# Patient Record
Sex: FEMALE | Race: WHITE | NOT HISPANIC OR LATINO | Employment: OTHER | URBAN - METROPOLITAN AREA
[De-identification: names, ages, dates, MRNs, and addresses within clinical notes are randomized per-mention and may not be internally consistent; named-entity substitution may affect disease eponyms.]

---

## 2021-11-12 ENCOUNTER — TELEPHONE (OUTPATIENT)
Dept: CARDIOLOGY | Facility: MEDICAL CENTER | Age: 66
End: 2021-11-12

## 2021-11-12 ENCOUNTER — APPOINTMENT (OUTPATIENT)
Dept: RADIOLOGY | Facility: MEDICAL CENTER | Age: 66
DRG: 280 | End: 2021-11-12
Attending: EMERGENCY MEDICINE
Payer: MEDICARE

## 2021-11-12 ENCOUNTER — HOSPITAL ENCOUNTER (INPATIENT)
Facility: MEDICAL CENTER | Age: 66
LOS: 5 days | DRG: 280 | End: 2021-11-17
Attending: HOSPITALIST | Admitting: INTERNAL MEDICINE
Payer: MEDICARE

## 2021-11-12 ENCOUNTER — APPOINTMENT (OUTPATIENT)
Dept: RADIOLOGY | Facility: MEDICAL CENTER | Age: 66
DRG: 280 | End: 2021-11-12
Attending: INTERNAL MEDICINE
Payer: MEDICARE

## 2021-11-12 ENCOUNTER — APPOINTMENT (OUTPATIENT)
Dept: CARDIOLOGY | Facility: MEDICAL CENTER | Age: 66
DRG: 280 | End: 2021-11-12
Attending: INTERNAL MEDICINE
Payer: MEDICARE

## 2021-11-12 DIAGNOSIS — J18.9 PNEUMONIA OF RIGHT LOWER LOBE DUE TO INFECTIOUS ORGANISM: ICD-10-CM

## 2021-11-12 DIAGNOSIS — I65.21 STENOSIS OF RIGHT CAROTID ARTERY: ICD-10-CM

## 2021-11-12 DIAGNOSIS — J96.01 ACUTE RESPIRATORY FAILURE WITH HYPOXIA (HCC): ICD-10-CM

## 2021-11-12 DIAGNOSIS — J96.02 ACUTE RESPIRATORY FAILURE WITH HYPERCAPNIA (HCC): ICD-10-CM

## 2021-11-12 DIAGNOSIS — J44.9 CHRONIC OBSTRUCTIVE PULMONARY DISEASE, UNSPECIFIED COPD TYPE (HCC): ICD-10-CM

## 2021-11-12 DIAGNOSIS — I95.9 HYPOTENSION, UNSPECIFIED HYPOTENSION TYPE: Primary | ICD-10-CM

## 2021-11-12 DIAGNOSIS — I21.3 ST ELEVATION MYOCARDIAL INFARCTION (STEMI), UNSPECIFIED ARTERY (HCC): ICD-10-CM

## 2021-11-12 DIAGNOSIS — R41.82 ALTERED MENTAL STATUS, UNSPECIFIED ALTERED MENTAL STATUS TYPE: ICD-10-CM

## 2021-11-12 DIAGNOSIS — R53.1 WEAKNESS: ICD-10-CM

## 2021-11-12 DIAGNOSIS — I21.02 ST ELEVATION MYOCARDIAL INFARCTION INVOLVING LEFT ANTERIOR DESCENDING (LAD) CORONARY ARTERY (HCC): ICD-10-CM

## 2021-11-12 PROBLEM — R41.0 DISORIENTATION: Status: ACTIVE | Noted: 2021-11-12

## 2021-11-12 LAB
ANION GAP SERPL CALC-SCNC: 14 MMOL/L (ref 7–16)
ANISOCYTOSIS BLD QL SMEAR: ABNORMAL
APTT PPP: 102.3 SEC (ref 24.7–36)
BASE EXCESS BLDV CALC-SCNC: -4 MMOL/L (ref -4–3)
BASOPHILS # BLD AUTO: 0 % (ref 0–1.8)
BASOPHILS # BLD: 0 K/UL (ref 0–0.12)
BODY TEMPERATURE: ABNORMAL DEGREES
BUN SERPL-MCNC: 42 MG/DL (ref 8–22)
CALCIUM SERPL-MCNC: 8.4 MG/DL (ref 8.5–10.5)
CHLORIDE SERPL-SCNC: 96 MMOL/L (ref 96–112)
CO2 BLDV-SCNC: 30 MMOL/L (ref 20–33)
CO2 SERPL-SCNC: 21 MMOL/L (ref 20–33)
CREAT SERPL-MCNC: 1 MG/DL (ref 0.5–1.4)
EKG IMPRESSION: NORMAL
EOSINOPHIL # BLD AUTO: 0 K/UL (ref 0–0.51)
EOSINOPHIL NFR BLD: 0 % (ref 0–6.9)
ERYTHROCYTE [DISTWIDTH] IN BLOOD BY AUTOMATED COUNT: 49.7 FL (ref 35.9–50)
GLUCOSE SERPL-MCNC: 176 MG/DL (ref 65–99)
HCO3 BLDV-SCNC: 27.2 MMOL/L (ref 24–28)
HCT VFR BLD AUTO: 38.5 % (ref 37–47)
HCT VFR BLD CALC: 44 % (ref 37–47)
HGB BLD-MCNC: 12.3 G/DL (ref 12–16)
HGB BLD-MCNC: 15 G/DL (ref 12–16)
INR PPP: 1.19 (ref 0.87–1.13)
LACTATE BLD-SCNC: 1.1 MMOL/L (ref 0.5–2)
LG PLATELETS BLD QL SMEAR: NORMAL
LV EJECT FRACT  99904: 75
LV EJECT FRACT  99904: 75
LV EJECT FRACT MOD 2C 99903: 42.51
LV EJECT FRACT MOD 4C 99902: 68.32
LV EJECT FRACT MOD BP 99901: 57.15
LYMPHOCYTES # BLD AUTO: 2.73 K/UL (ref 1–4.8)
LYMPHOCYTES NFR BLD: 4.1 % (ref 22–41)
MACROCYTES BLD QL SMEAR: ABNORMAL
MAGNESIUM SERPL-MCNC: 2.2 MG/DL (ref 1.5–2.5)
MANUAL DIFF BLD: NORMAL
MCH RBC QN AUTO: 30.3 PG (ref 27–33)
MCHC RBC AUTO-ENTMCNC: 31.9 G/DL (ref 33.6–35)
MCV RBC AUTO: 94.8 FL (ref 81.4–97.8)
METAMYELOCYTES NFR BLD MANUAL: 4.9 %
MONOCYTES # BLD AUTO: 2.19 K/UL (ref 0–0.85)
MONOCYTES NFR BLD AUTO: 3.3 % (ref 0–13.4)
MORPHOLOGY BLD-IMP: NORMAL
MYELOCYTES NFR BLD MANUAL: 6.6 %
NEUTROPHILS # BLD AUTO: 53.93 K/UL (ref 2–7.15)
NEUTROPHILS NFR BLD: 79.5 % (ref 44–72)
NEUTS BAND NFR BLD MANUAL: 1.6 % (ref 0–10)
NRBC # BLD AUTO: 0.05 K/UL
NRBC BLD-RTO: 0.1 /100 WBC
NT-PROBNP SERPL IA-MCNC: ABNORMAL PG/ML (ref 0–125)
PCO2 BLDV: 80.2 MMHG (ref 41–51)
PCO2 TEMP ADJ BLDV: 79.2 MMHG (ref 41–51)
PH BLDV: 7.14 [PH] (ref 7.31–7.45)
PH TEMP ADJ BLDV: 7.14 [PH] (ref 7.31–7.45)
PLATELET # BLD AUTO: 617 K/UL (ref 164–446)
PLATELET BLD QL SMEAR: NORMAL
PMV BLD AUTO: 9.7 FL (ref 9–12.9)
PO2 BLDV: 94 MMHG (ref 25–40)
PO2 TEMP ADJ BLDV: 92 MMHG (ref 25–40)
POTASSIUM SERPL-SCNC: 4.9 MMOL/L (ref 3.6–5.5)
PROCALCITONIN SERPL-MCNC: 4.39 NG/ML
PROTHROMBIN TIME: 14.7 SEC (ref 12–14.6)
RBC # BLD AUTO: 4.06 M/UL (ref 4.2–5.4)
RBC BLD AUTO: PRESENT
SAO2 % BLDV: 94 %
SODIUM SERPL-SCNC: 131 MMOL/L (ref 135–145)
SPECIMEN DRAWN FROM PATIENT: ABNORMAL
TROPONIN T SERPL-MCNC: 455 NG/L (ref 6–19)
UFH PPP CHRO-ACNC: 0.33 IU/ML
UFH PPP CHRO-ACNC: 0.61 IU/ML
WBC # BLD AUTO: 66.5 K/UL (ref 4.8–10.8)

## 2021-11-12 PROCEDURE — 96368 THER/DIAG CONCURRENT INF: CPT

## 2021-11-12 PROCEDURE — 92950 HEART/LUNG RESUSCITATION CPR: CPT

## 2021-11-12 PROCEDURE — 03HY32Z INSERTION OF MONITORING DEVICE INTO UPPER ARTERY, PERCUTANEOUS APPROACH: ICD-10-PCS | Performed by: INTERNAL MEDICINE

## 2021-11-12 PROCEDURE — 94002 VENT MGMT INPAT INIT DAY: CPT

## 2021-11-12 PROCEDURE — 700101 HCHG RX REV CODE 250: Performed by: INTERNAL MEDICINE

## 2021-11-12 PROCEDURE — 93308 TTE F-UP OR LMTD: CPT

## 2021-11-12 PROCEDURE — 304538 HCHG NG TUBE

## 2021-11-12 PROCEDURE — 700111 HCHG RX REV CODE 636 W/ 250 OVERRIDE (IP): Performed by: EMERGENCY MEDICINE

## 2021-11-12 PROCEDURE — 700101 HCHG RX REV CODE 250: Performed by: EMERGENCY MEDICINE

## 2021-11-12 PROCEDURE — 31500 INSERT EMERGENCY AIRWAY: CPT

## 2021-11-12 PROCEDURE — 80500 HCHG CLINICAL PATH CONSULT-LIMITED: CPT

## 2021-11-12 PROCEDURE — 700105 HCHG RX REV CODE 258: Performed by: INTERNAL MEDICINE

## 2021-11-12 PROCEDURE — 700111 HCHG RX REV CODE 636 W/ 250 OVERRIDE (IP): Performed by: INTERNAL MEDICINE

## 2021-11-12 PROCEDURE — 02HV33Z INSERTION OF INFUSION DEVICE INTO SUPERIOR VENA CAVA, PERCUTANEOUS APPROACH: ICD-10-PCS | Performed by: INTERNAL MEDICINE

## 2021-11-12 PROCEDURE — 770022 HCHG ROOM/CARE - ICU (200)

## 2021-11-12 PROCEDURE — 85007 BL SMEAR W/DIFF WBC COUNT: CPT

## 2021-11-12 PROCEDURE — 99291 CRITICAL CARE FIRST HOUR: CPT | Performed by: INTERNAL MEDICINE

## 2021-11-12 PROCEDURE — 36620 INSERTION CATHETER ARTERY: CPT | Performed by: NURSE PRACTITIONER

## 2021-11-12 PROCEDURE — 71045 X-RAY EXAM CHEST 1 VIEW: CPT

## 2021-11-12 PROCEDURE — 303105 HCHG CATHETER EXTRA

## 2021-11-12 PROCEDURE — 5A1945Z RESPIRATORY VENTILATION, 24-96 CONSECUTIVE HOURS: ICD-10-PCS | Performed by: INTERNAL MEDICINE

## 2021-11-12 PROCEDURE — 87040 BLOOD CULTURE FOR BACTERIA: CPT

## 2021-11-12 PROCEDURE — 302214 INTUBATION BOX: Performed by: HOSPITALIST

## 2021-11-12 PROCEDURE — 82330 ASSAY OF CALCIUM: CPT

## 2021-11-12 PROCEDURE — 84295 ASSAY OF SERUM SODIUM: CPT

## 2021-11-12 PROCEDURE — C1751 CATH, INF, PER/CENT/MIDLINE: HCPCS

## 2021-11-12 PROCEDURE — 70450 CT HEAD/BRAIN W/O DYE: CPT

## 2021-11-12 PROCEDURE — 36620 INSERTION CATHETER ARTERY: CPT

## 2021-11-12 PROCEDURE — 85520 HEPARIN ASSAY: CPT | Mod: 91

## 2021-11-12 PROCEDURE — 83880 ASSAY OF NATRIURETIC PEPTIDE: CPT

## 2021-11-12 PROCEDURE — 36556 INSERT NON-TUNNEL CV CATH: CPT

## 2021-11-12 PROCEDURE — 37799 UNLISTED PX VASCULAR SURGERY: CPT

## 2021-11-12 PROCEDURE — 96365 THER/PROPH/DIAG IV INF INIT: CPT

## 2021-11-12 PROCEDURE — 87070 CULTURE OTHR SPECIMN AEROBIC: CPT

## 2021-11-12 PROCEDURE — 96366 THER/PROPH/DIAG IV INF ADDON: CPT

## 2021-11-12 PROCEDURE — 85730 THROMBOPLASTIN TIME PARTIAL: CPT

## 2021-11-12 PROCEDURE — 82803 BLOOD GASES ANY COMBINATION: CPT

## 2021-11-12 PROCEDURE — 99291 CRITICAL CARE FIRST HOUR: CPT | Mod: 25 | Performed by: INTERNAL MEDICINE

## 2021-11-12 PROCEDURE — 700105 HCHG RX REV CODE 258

## 2021-11-12 PROCEDURE — 84484 ASSAY OF TROPONIN QUANT: CPT

## 2021-11-12 PROCEDURE — 99292 CRITICAL CARE ADDL 30 MIN: CPT | Mod: 25 | Performed by: INTERNAL MEDICINE

## 2021-11-12 PROCEDURE — 700111 HCHG RX REV CODE 636 W/ 250 OVERRIDE (IP)

## 2021-11-12 PROCEDURE — 87205 SMEAR GRAM STAIN: CPT

## 2021-11-12 PROCEDURE — 84132 ASSAY OF SERUM POTASSIUM: CPT

## 2021-11-12 PROCEDURE — 99223 1ST HOSP IP/OBS HIGH 75: CPT | Performed by: PSYCHIATRY & NEUROLOGY

## 2021-11-12 PROCEDURE — 83735 ASSAY OF MAGNESIUM: CPT

## 2021-11-12 PROCEDURE — 85610 PROTHROMBIN TIME: CPT

## 2021-11-12 PROCEDURE — 99292 CRITICAL CARE ADDL 30 MIN: CPT | Performed by: INTERNAL MEDICINE

## 2021-11-12 PROCEDURE — 0042T CT-CEREBRAL PERFUSION ANALYSIS: CPT

## 2021-11-12 PROCEDURE — 99291 CRITICAL CARE FIRST HOUR: CPT

## 2021-11-12 PROCEDURE — 93005 ELECTROCARDIOGRAM TRACING: CPT | Performed by: INTERNAL MEDICINE

## 2021-11-12 PROCEDURE — 85027 COMPLETE CBC AUTOMATED: CPT

## 2021-11-12 PROCEDURE — 0BH17EZ INSERTION OF ENDOTRACHEAL AIRWAY INTO TRACHEA, VIA NATURAL OR ARTIFICIAL OPENING: ICD-10-PCS | Performed by: EMERGENCY MEDICINE

## 2021-11-12 PROCEDURE — 51702 INSERT TEMP BLADDER CATH: CPT

## 2021-11-12 PROCEDURE — 96367 TX/PROPH/DG ADDL SEQ IV INF: CPT

## 2021-11-12 PROCEDURE — 83605 ASSAY OF LACTIC ACID: CPT

## 2021-11-12 PROCEDURE — 93308 TTE F-UP OR LMTD: CPT | Mod: 26,76 | Performed by: INTERNAL MEDICINE

## 2021-11-12 PROCEDURE — 93325 DOPPLER ECHO COLOR FLOW MAPG: CPT

## 2021-11-12 PROCEDURE — 84145 PROCALCITONIN (PCT): CPT

## 2021-11-12 PROCEDURE — 93308 TTE F-UP OR LMTD: CPT | Mod: 26 | Performed by: INTERNAL MEDICINE

## 2021-11-12 PROCEDURE — 85014 HEMATOCRIT: CPT

## 2021-11-12 PROCEDURE — 70496 CT ANGIOGRAPHY HEAD: CPT

## 2021-11-12 PROCEDURE — 700117 HCHG RX CONTRAST REV CODE 255: Performed by: INTERNAL MEDICINE

## 2021-11-12 PROCEDURE — 700101 HCHG RX REV CODE 250

## 2021-11-12 PROCEDURE — 70498 CT ANGIOGRAPHY NECK: CPT

## 2021-11-12 PROCEDURE — 36556 INSERT NON-TUNNEL CV CATH: CPT | Mod: RT | Performed by: INTERNAL MEDICINE

## 2021-11-12 PROCEDURE — 80048 BASIC METABOLIC PNL TOTAL CA: CPT

## 2021-11-12 PROCEDURE — 96375 TX/PRO/DX INJ NEW DRUG ADDON: CPT

## 2021-11-12 RX ORDER — BISACODYL 10 MG
10 SUPPOSITORY, RECTAL RECTAL
Status: DISCONTINUED | OUTPATIENT
Start: 2021-11-12 | End: 2021-11-15

## 2021-11-12 RX ORDER — LORAZEPAM 0.5 MG/1
0.25 TABLET ORAL
COMMUNITY
End: 2022-10-24

## 2021-11-12 RX ORDER — ESCITALOPRAM OXALATE 10 MG/1
10 TABLET ORAL EVERY EVENING
COMMUNITY

## 2021-11-12 RX ORDER — FAMOTIDINE 20 MG/1
20 TABLET, FILM COATED ORAL EVERY 12 HOURS
Status: DISCONTINUED | OUTPATIENT
Start: 2021-11-12 | End: 2021-11-13

## 2021-11-12 RX ORDER — AMOXICILLIN 250 MG
2 CAPSULE ORAL 2 TIMES DAILY
Status: DISCONTINUED | OUTPATIENT
Start: 2021-11-12 | End: 2021-11-15

## 2021-11-12 RX ORDER — HEPARIN SODIUM 200 [USP'U]/100ML
INJECTION, SOLUTION INTRAVENOUS
Status: COMPLETED
Start: 2021-11-12 | End: 2021-11-12

## 2021-11-12 RX ORDER — NOREPINEPHRINE BITARTRATE 0.03 MG/ML
INJECTION, SOLUTION INTRAVENOUS
Status: COMPLETED | OUTPATIENT
Start: 2021-11-12 | End: 2021-11-12

## 2021-11-12 RX ORDER — BUPROPION HYDROCHLORIDE 75 MG/1
75 TABLET ORAL EVERY MORNING
COMMUNITY

## 2021-11-12 RX ORDER — VERAPAMIL HYDROCHLORIDE 2.5 MG/ML
INJECTION, SOLUTION INTRAVENOUS
Status: COMPLETED
Start: 2021-11-12 | End: 2021-11-12

## 2021-11-12 RX ORDER — ETOMIDATE 2 MG/ML
INJECTION INTRAVENOUS
Status: COMPLETED | OUTPATIENT
Start: 2021-11-12 | End: 2021-11-12

## 2021-11-12 RX ORDER — TOPIRAMATE 25 MG/1
75 TABLET ORAL EVERY EVENING
COMMUNITY

## 2021-11-12 RX ORDER — NOREPINEPHRINE BITARTRATE 0.03 MG/ML
0-30 INJECTION, SOLUTION INTRAVENOUS CONTINUOUS
Status: DISCONTINUED | OUTPATIENT
Start: 2021-11-12 | End: 2021-11-15

## 2021-11-12 RX ORDER — EPINEPHRINE HCL IN 0.9 % NACL 4MG/250ML
0-10 PLASTIC BAG, INJECTION (ML) INTRAVENOUS CONTINUOUS
Status: DISCONTINUED | OUTPATIENT
Start: 2021-11-12 | End: 2021-11-14

## 2021-11-12 RX ORDER — PHENYLEPHRINE HCL IN 0.9% NACL 0.5 MG/5ML
100 SYRINGE (ML) INTRAVENOUS
Status: DISPENSED | OUTPATIENT
Start: 2021-11-12 | End: 2021-11-13

## 2021-11-12 RX ORDER — HEPARIN SODIUM 5000 [USP'U]/100ML
0-30 INJECTION, SOLUTION INTRAVENOUS CONTINUOUS
Status: DISCONTINUED | OUTPATIENT
Start: 2021-11-12 | End: 2021-11-15

## 2021-11-12 RX ORDER — LIDOCAINE HYDROCHLORIDE 20 MG/ML
INJECTION, SOLUTION INFILTRATION; PERINEURAL
Status: COMPLETED
Start: 2021-11-12 | End: 2021-11-12

## 2021-11-12 RX ORDER — IPRATROPIUM BROMIDE AND ALBUTEROL SULFATE 2.5; .5 MG/3ML; MG/3ML
3 SOLUTION RESPIRATORY (INHALATION)
Status: DISCONTINUED | OUTPATIENT
Start: 2021-11-12 | End: 2021-11-17 | Stop reason: HOSPADM

## 2021-11-12 RX ORDER — ROCURONIUM BROMIDE 10 MG/ML
INJECTION, SOLUTION INTRAVENOUS
Status: COMPLETED | OUTPATIENT
Start: 2021-11-12 | End: 2021-11-12

## 2021-11-12 RX ORDER — TRAZODONE HYDROCHLORIDE 100 MG/1
50 TABLET ORAL
COMMUNITY

## 2021-11-12 RX ORDER — HEPARIN SODIUM 1000 [USP'U]/ML
INJECTION, SOLUTION INTRAVENOUS; SUBCUTANEOUS
Status: COMPLETED
Start: 2021-11-12 | End: 2021-11-12

## 2021-11-12 RX ORDER — NOREPINEPHRINE BITARTRATE 0.03 MG/ML
INJECTION, SOLUTION INTRAVENOUS
Status: COMPLETED
Start: 2021-11-12 | End: 2021-11-12

## 2021-11-12 RX ORDER — DEXMEDETOMIDINE HYDROCHLORIDE 4 UG/ML
.1-1.5 INJECTION, SOLUTION INTRAVENOUS CONTINUOUS
Status: DISCONTINUED | OUTPATIENT
Start: 2021-11-12 | End: 2021-11-14

## 2021-11-12 RX ORDER — HEPARIN SODIUM 1000 [USP'U]/ML
30 INJECTION, SOLUTION INTRAVENOUS; SUBCUTANEOUS PRN
Status: DISCONTINUED | OUTPATIENT
Start: 2021-11-12 | End: 2021-11-15

## 2021-11-12 RX ORDER — ALBUTEROL SULFATE 90 UG/1
2 AEROSOL, METERED RESPIRATORY (INHALATION) EVERY 6 HOURS PRN
Status: ON HOLD | COMMUNITY
End: 2022-10-20

## 2021-11-12 RX ORDER — POLYETHYLENE GLYCOL 3350 17 G/17G
1 POWDER, FOR SOLUTION ORAL
Status: DISCONTINUED | OUTPATIENT
Start: 2021-11-12 | End: 2021-11-15

## 2021-11-12 RX ORDER — SODIUM CHLORIDE 9 MG/ML
500 INJECTION, SOLUTION INTRAVENOUS ONCE
Status: COMPLETED | OUTPATIENT
Start: 2021-11-12 | End: 2021-11-12

## 2021-11-12 RX ORDER — EPINEPHRINE HCL IN 0.9 % NACL 4MG/250ML
PLASTIC BAG, INJECTION (ML) INTRAVENOUS
Status: COMPLETED
Start: 2021-11-12 | End: 2021-11-12

## 2021-11-12 RX ORDER — PREDNISONE 20 MG/1
50 TABLET ORAL DAILY
Status: ON HOLD | COMMUNITY
End: 2022-10-20

## 2021-11-12 RX ORDER — TOPIRAMATE 25 MG/1
75 TABLET ORAL EVERY EVENING
Status: DISCONTINUED | OUTPATIENT
Start: 2021-11-12 | End: 2021-11-13

## 2021-11-12 RX ADMIN — HEPARIN SODIUM 12 UNITS/KG/HR: 5000 INJECTION, SOLUTION INTRAVENOUS at 17:10

## 2021-11-12 RX ADMIN — IPRATROPIUM BROMIDE AND ALBUTEROL SULFATE 3 ML: .5; 2.5 SOLUTION RESPIRATORY (INHALATION) at 20:45

## 2021-11-12 RX ADMIN — ETOMIDATE 20 MG: 2 INJECTION INTRAVENOUS at 18:29

## 2021-11-12 RX ADMIN — VASOPRESSIN 0.03 UNITS/MIN: 20 INJECTION INTRAVENOUS at 23:44

## 2021-11-12 RX ADMIN — DEXMEDETOMIDINE 0.7 MCG/KG/HR: 200 INJECTION, SOLUTION INTRAVENOUS at 19:17

## 2021-11-12 RX ADMIN — NOREPINEPHRINE BITARTRATE 30 MCG/MIN: 1 INJECTION INTRAVENOUS at 18:45

## 2021-11-12 RX ADMIN — ROCURONIUM BROMIDE 80 MG: 10 INJECTION, SOLUTION INTRAVENOUS at 18:30

## 2021-11-12 RX ADMIN — SODIUM CHLORIDE 500 ML: 9 INJECTION, SOLUTION INTRAVENOUS at 20:12

## 2021-11-12 RX ADMIN — IOHEXOL 40 ML: 350 INJECTION, SOLUTION INTRAVENOUS at 19:33

## 2021-11-12 RX ADMIN — EPINEPHRINE 2 MCG/MIN: 1 INJECTION INTRAMUSCULAR; INTRAVENOUS; SUBCUTANEOUS at 21:36

## 2021-11-12 RX ADMIN — NOREPINEPHRINE BITARTRATE 30 MCG/MIN: 1 INJECTION, SOLUTION, CONCENTRATE INTRAVENOUS at 20:12

## 2021-11-12 RX ADMIN — IOHEXOL 80 ML: 350 INJECTION, SOLUTION INTRAVENOUS at 19:31

## 2021-11-12 RX ADMIN — DOXYCYCLINE 100 MG: 100 INJECTION, POWDER, LYOPHILIZED, FOR SOLUTION INTRAVENOUS at 21:34

## 2021-11-12 RX ADMIN — IPRATROPIUM BROMIDE AND ALBUTEROL SULFATE 3 ML: .5; 2.5 SOLUTION RESPIRATORY (INHALATION) at 23:13

## 2021-11-12 RX ADMIN — FENTANYL CITRATE 100 MCG: 50 INJECTION, SOLUTION INTRAMUSCULAR; INTRAVENOUS at 19:16

## 2021-11-12 RX ADMIN — NOREPINEPHRINE BITARTRATE 5 MCG/MIN: 1 INJECTION INTRAVENOUS at 18:33

## 2021-11-12 NOTE — TELEPHONE ENCOUNTER
Call patient presents with chest pain for 8 days to an outside hospital and [facility], EKG shows remarkable ST elevations in the inferior and lateral leads including 1 concerning for significant MI blood pressure is adequate she has received lytics and will be transferred here immediately for evaluation and likely cardiac catheterization on arrival.    It is my pleasure to participate in the care of Ms. Prieto.  Please do not hesitate to contact me with questions or concerns.    Isaiah Beach MD PhD [facility]  Cardiologist [facility]    Please note that this dictation was created using voice recognition software. There may be errors I did not discover before finalizing the note.     11/12/2021  3:40 PM

## 2021-11-13 ENCOUNTER — HOSPITAL ENCOUNTER (OUTPATIENT)
Dept: RADIOLOGY | Facility: MEDICAL CENTER | Age: 66
End: 2021-11-13
Attending: INTERNAL MEDICINE

## 2021-11-13 PROBLEM — R94.31 PROLONGED Q-T INTERVAL ON ECG: Status: ACTIVE | Noted: 2021-11-13

## 2021-11-13 PROBLEM — E11.65 TYPE 2 DIABETES MELLITUS WITH HYPERGLYCEMIA, WITHOUT LONG-TERM CURRENT USE OF INSULIN (HCC): Status: ACTIVE | Noted: 2021-11-13

## 2021-11-13 PROBLEM — R40.4 ALTERED LEVEL OF CONSCIOUSNESS: Status: ACTIVE | Noted: 2021-11-13

## 2021-11-13 PROBLEM — R57.9 SHOCK (HCC): Status: ACTIVE | Noted: 2021-11-13

## 2021-11-13 PROBLEM — D72.829 LEUKOCYTOSIS: Status: ACTIVE | Noted: 2021-11-13

## 2021-11-13 PROBLEM — J96.01 ACUTE RESPIRATORY FAILURE WITH HYPOXIA AND HYPERCAPNIA (HCC): Status: ACTIVE | Noted: 2021-11-13

## 2021-11-13 PROBLEM — R53.1 ACUTE LEFT-SIDED WEAKNESS: Status: ACTIVE | Noted: 2021-11-13

## 2021-11-13 PROBLEM — N17.0 ACUTE RENAL FAILURE WITH TUBULAR NECROSIS (HCC): Status: ACTIVE | Noted: 2021-11-13

## 2021-11-13 PROBLEM — J96.02 ACUTE RESPIRATORY FAILURE WITH HYPOXIA AND HYPERCAPNIA (HCC): Status: ACTIVE | Noted: 2021-11-13

## 2021-11-13 PROBLEM — E87.1 HYPONATREMIA: Status: ACTIVE | Noted: 2021-11-13

## 2021-11-13 PROBLEM — J44.9 COPD (CHRONIC OBSTRUCTIVE PULMONARY DISEASE) (HCC): Status: ACTIVE | Noted: 2021-11-13

## 2021-11-13 PROBLEM — D75.839 THROMBOCYTOSIS: Status: ACTIVE | Noted: 2021-11-13

## 2021-11-13 PROBLEM — J18.9 PNEUMONIA DUE TO INFECTIOUS ORGANISM: Status: ACTIVE | Noted: 2021-11-13

## 2021-11-13 LAB
ALBUMIN SERPL BCP-MCNC: 2.4 G/DL (ref 3.2–4.9)
ALBUMIN/GLOB SERPL: 0.7 G/DL
ALP SERPL-CCNC: 185 U/L (ref 30–99)
ALT SERPL-CCNC: 21 U/L (ref 2–50)
ANION GAP SERPL CALC-SCNC: 14 MMOL/L (ref 7–16)
ANION GAP SERPL CALC-SCNC: 16 MMOL/L (ref 7–16)
ANISOCYTOSIS BLD QL SMEAR: ABNORMAL
AST SERPL-CCNC: 16 U/L (ref 12–45)
BASE EXCESS BLDA CALC-SCNC: -2 MMOL/L (ref -4–3)
BASE EXCESS BLDA CALC-SCNC: -7 MMOL/L (ref -4–3)
BASOPHILS # BLD AUTO: 0.8 % (ref 0–1.8)
BASOPHILS # BLD: 0.47 K/UL (ref 0–0.12)
BILIRUB SERPL-MCNC: 0.3 MG/DL (ref 0.1–1.5)
BODY TEMPERATURE: ABNORMAL DEGREES
BODY TEMPERATURE: ABNORMAL DEGREES
BREATHS SETTING VENT: 22
BREATHS SETTING VENT: 24
BUN SERPL-MCNC: 43 MG/DL (ref 8–22)
BUN SERPL-MCNC: 51 MG/DL (ref 8–22)
CA-I BLD ISE-SCNC: 1.1 MMOL/L (ref 1.1–1.3)
CA-I BLD ISE-SCNC: 1.2 MMOL/L (ref 1.1–1.3)
CALCIUM SERPL-MCNC: 7.9 MG/DL (ref 8.5–10.5)
CALCIUM SERPL-MCNC: 8.3 MG/DL (ref 8.5–10.5)
CHLORIDE SERPL-SCNC: 100 MMOL/L (ref 96–112)
CHLORIDE SERPL-SCNC: 94 MMOL/L (ref 96–112)
CO2 BLDA-SCNC: 22 MMOL/L (ref 20–33)
CO2 BLDA-SCNC: 25 MMOL/L (ref 20–33)
CO2 SERPL-SCNC: 21 MMOL/L (ref 20–33)
CO2 SERPL-SCNC: 22 MMOL/L (ref 20–33)
CREAT SERPL-MCNC: 1.19 MG/DL (ref 0.5–1.4)
CREAT SERPL-MCNC: 1.29 MG/DL (ref 0.5–1.4)
DELSYS IDSYS: ABNORMAL
DELSYS IDSYS: ABNORMAL
DOHLE BOD BLD QL SMEAR: NORMAL
EKG IMPRESSION: NORMAL
EKG IMPRESSION: NORMAL
END TIDAL CARBON DIOXIDE IECO2: 32 MMHG
END TIDAL CARBON DIOXIDE IECO2: 38 MMHG
EOSINOPHIL # BLD AUTO: 0 K/UL (ref 0–0.51)
EOSINOPHIL NFR BLD: 0 % (ref 0–6.9)
ERYTHROCYTE [DISTWIDTH] IN BLOOD BY AUTOMATED COUNT: 49.1 FL (ref 35.9–50)
GLOBULIN SER CALC-MCNC: 3.4 G/DL (ref 1.9–3.5)
GLUCOSE BLD-MCNC: 135 MG/DL (ref 65–99)
GLUCOSE BLD-MCNC: 140 MG/DL (ref 65–99)
GLUCOSE SERPL-MCNC: 148 MG/DL (ref 65–99)
GLUCOSE SERPL-MCNC: 207 MG/DL (ref 65–99)
GRAM STN SPEC: NORMAL
HCO3 BLDA-SCNC: 20.4 MMOL/L (ref 17–25)
HCO3 BLDA-SCNC: 23.3 MMOL/L (ref 17–25)
HCT VFR BLD AUTO: 30 % (ref 37–47)
HCT VFR BLD AUTO: 35.7 % (ref 37–47)
HCT VFR BLD CALC: 38 % (ref 37–47)
HGB BLD-MCNC: 10 G/DL (ref 12–16)
HGB BLD-MCNC: 11.9 G/DL (ref 12–16)
HGB BLD-MCNC: 12.9 G/DL (ref 12–16)
HOROWITZ INDEX BLDA+IHG-RTO: 150 MM[HG]
HOROWITZ INDEX BLDA+IHG-RTO: 187 MM[HG]
LYMPHOCYTES # BLD AUTO: 0.53 K/UL (ref 1–4.8)
LYMPHOCYTES NFR BLD: 0.9 % (ref 22–41)
MACROCYTES BLD QL SMEAR: ABNORMAL
MAGNESIUM SERPL-MCNC: 2.3 MG/DL (ref 1.5–2.5)
MANUAL DIFF BLD: NORMAL
MCH RBC QN AUTO: 31.2 PG (ref 27–33)
MCHC RBC AUTO-ENTMCNC: 33.3 G/DL (ref 33.6–35)
MCV RBC AUTO: 93.7 FL (ref 81.4–97.8)
METAMYELOCYTES NFR BLD MANUAL: 2.6 %
MICROCYTES BLD QL SMEAR: ABNORMAL
MODE IMODE: ABNORMAL
MODE IMODE: ABNORMAL
MONOCYTES # BLD AUTO: 0.53 K/UL (ref 0–0.85)
MONOCYTES NFR BLD AUTO: 0.9 % (ref 0–13.4)
MORPHOLOGY BLD-IMP: NORMAL
MYELOCYTES NFR BLD MANUAL: 12.1 %
NEUTROPHILS # BLD AUTO: 46.63 K/UL (ref 2–7.15)
NEUTROPHILS NFR BLD: 75.9 % (ref 44–72)
NEUTS BAND NFR BLD MANUAL: 3.4 % (ref 0–10)
NRBC # BLD AUTO: 0.08 K/UL
NRBC BLD-RTO: 0.1 /100 WBC
NT-PROBNP SERPL IA-MCNC: ABNORMAL PG/ML (ref 0–125)
O2/TOTAL GAS SETTING VFR VENT: 30 %
O2/TOTAL GAS SETTING VFR VENT: 50 %
PCO2 BLDA: 42.3 MMHG (ref 26–37)
PCO2 BLDA: 49.4 MMHG (ref 26–37)
PCO2 TEMP ADJ BLDA: 44.6 MMHG (ref 26–37)
PCO2 TEMP ADJ BLDA: 49.6 MMHG (ref 26–37)
PEEP END EXPIRATORY PRESSURE IPEEP: 8 CMH20
PEEP END EXPIRATORY PRESSURE IPEEP: 8 CMH20
PH BLDA: 7.22 [PH] (ref 7.4–7.5)
PH BLDA: 7.35 [PH] (ref 7.4–7.5)
PH TEMP ADJ BLDA: 7.22 [PH] (ref 7.4–7.5)
PH TEMP ADJ BLDA: 7.33 [PH] (ref 7.4–7.5)
PHOSPHATE SERPL-MCNC: 5.8 MG/DL (ref 2.5–4.5)
PLATELET # BLD AUTO: 640 K/UL (ref 164–446)
PLATELET BLD QL SMEAR: NORMAL
PMV BLD AUTO: 10.1 FL (ref 9–12.9)
PO2 BLDA: 56 MMHG (ref 64–87)
PO2 BLDA: 75 MMHG (ref 64–87)
PO2 TEMP ADJ BLDA: 61 MMHG (ref 64–87)
PO2 TEMP ADJ BLDA: 75 MMHG (ref 64–87)
POLYCHROMASIA BLD QL SMEAR: NORMAL
POTASSIUM BLD-SCNC: 4.3 MMOL/L (ref 3.6–5.5)
POTASSIUM BLD-SCNC: 4.3 MMOL/L (ref 3.6–5.5)
POTASSIUM SERPL-SCNC: 4.4 MMOL/L (ref 3.6–5.5)
POTASSIUM SERPL-SCNC: 4.6 MMOL/L (ref 3.6–5.5)
PROMYELOCYTES NFR BLD MANUAL: 3.4 %
PROT SERPL-MCNC: 5.8 G/DL (ref 6–8.2)
RBC # BLD AUTO: 3.81 M/UL (ref 4.2–5.4)
RBC BLD AUTO: PRESENT
SAO2 % BLDA: 87 % (ref 93–99)
SAO2 % BLDA: 92 % (ref 93–99)
SIGNIFICANT IND 70042: NORMAL
SITE SITE: NORMAL
SODIUM BLD-SCNC: 127 MMOL/L (ref 135–145)
SODIUM BLD-SCNC: 132 MMOL/L (ref 135–145)
SODIUM SERPL-SCNC: 132 MMOL/L (ref 135–145)
SODIUM SERPL-SCNC: 135 MMOL/L (ref 135–145)
SOURCE SOURCE: NORMAL
SPECIMEN DRAWN FROM PATIENT: ABNORMAL
SPECIMEN DRAWN FROM PATIENT: ABNORMAL
TIDAL VOLUME IVT: 340 ML
TIDAL VOLUME IVT: 380 ML
TOXIC GRANULES BLD QL SMEAR: SLIGHT
WBC # BLD AUTO: 58.8 K/UL (ref 4.8–10.8)

## 2021-11-13 PROCEDURE — 83880 ASSAY OF NATRIURETIC PEPTIDE: CPT

## 2021-11-13 PROCEDURE — 84100 ASSAY OF PHOSPHORUS: CPT

## 2021-11-13 PROCEDURE — 99233 SBSQ HOSP IP/OBS HIGH 50: CPT | Performed by: INTERNAL MEDICINE

## 2021-11-13 PROCEDURE — 700105 HCHG RX REV CODE 258: Performed by: INTERNAL MEDICINE

## 2021-11-13 PROCEDURE — 700111 HCHG RX REV CODE 636 W/ 250 OVERRIDE (IP): Performed by: INTERNAL MEDICINE

## 2021-11-13 PROCEDURE — 37799 UNLISTED PX VASCULAR SURGERY: CPT

## 2021-11-13 PROCEDURE — 80053 COMPREHEN METABOLIC PANEL: CPT

## 2021-11-13 PROCEDURE — 71045 X-RAY EXAM CHEST 1 VIEW: CPT

## 2021-11-13 PROCEDURE — 87040 BLOOD CULTURE FOR BACTERIA: CPT

## 2021-11-13 PROCEDURE — 770022 HCHG ROOM/CARE - ICU (200)

## 2021-11-13 PROCEDURE — 82803 BLOOD GASES ANY COMBINATION: CPT

## 2021-11-13 PROCEDURE — 700101 HCHG RX REV CODE 250: Performed by: INTERNAL MEDICINE

## 2021-11-13 PROCEDURE — A9270 NON-COVERED ITEM OR SERVICE: HCPCS | Performed by: INTERNAL MEDICINE

## 2021-11-13 PROCEDURE — 99291 CRITICAL CARE FIRST HOUR: CPT | Performed by: INTERNAL MEDICINE

## 2021-11-13 PROCEDURE — 94003 VENT MGMT INPAT SUBQ DAY: CPT

## 2021-11-13 PROCEDURE — 82962 GLUCOSE BLOOD TEST: CPT | Mod: 91

## 2021-11-13 PROCEDURE — 80048 BASIC METABOLIC PNL TOTAL CA: CPT

## 2021-11-13 PROCEDURE — 85027 COMPLETE CBC AUTOMATED: CPT

## 2021-11-13 PROCEDURE — 93005 ELECTROCARDIOGRAM TRACING: CPT | Performed by: INTERNAL MEDICINE

## 2021-11-13 PROCEDURE — 93010 ELECTROCARDIOGRAM REPORT: CPT | Performed by: INTERNAL MEDICINE

## 2021-11-13 PROCEDURE — 94799 UNLISTED PULMONARY SVC/PX: CPT

## 2021-11-13 PROCEDURE — 85018 HEMOGLOBIN: CPT

## 2021-11-13 PROCEDURE — 85007 BL SMEAR W/DIFF WBC COUNT: CPT

## 2021-11-13 PROCEDURE — 700102 HCHG RX REV CODE 250 W/ 637 OVERRIDE(OP): Performed by: INTERNAL MEDICINE

## 2021-11-13 PROCEDURE — 85520 HEPARIN ASSAY: CPT

## 2021-11-13 PROCEDURE — 99292 CRITICAL CARE ADDL 30 MIN: CPT | Performed by: INTERNAL MEDICINE

## 2021-11-13 PROCEDURE — 85014 HEMATOCRIT: CPT

## 2021-11-13 PROCEDURE — 83735 ASSAY OF MAGNESIUM: CPT

## 2021-11-13 RX ORDER — SODIUM CHLORIDE, SODIUM LACTATE, POTASSIUM CHLORIDE, AND CALCIUM CHLORIDE .6; .31; .03; .02 G/100ML; G/100ML; G/100ML; G/100ML
500 INJECTION, SOLUTION INTRAVENOUS ONCE
Status: COMPLETED | OUTPATIENT
Start: 2021-11-13 | End: 2021-11-13

## 2021-11-13 RX ORDER — TOPIRAMATE 25 MG/1
75 TABLET ORAL EVERY EVENING
Status: DISCONTINUED | OUTPATIENT
Start: 2021-11-13 | End: 2021-11-15

## 2021-11-13 RX ORDER — ESCITALOPRAM OXALATE 10 MG/1
10 TABLET ORAL EVERY EVENING
Status: DISCONTINUED | OUTPATIENT
Start: 2021-11-13 | End: 2021-11-15

## 2021-11-13 RX ORDER — ATORVASTATIN CALCIUM 40 MG/1
40 TABLET, FILM COATED ORAL EVERY EVENING
Status: DISCONTINUED | OUTPATIENT
Start: 2021-11-13 | End: 2021-11-15

## 2021-11-13 RX ORDER — DEXTROSE MONOHYDRATE 25 G/50ML
50 INJECTION, SOLUTION INTRAVENOUS
Status: DISCONTINUED | OUTPATIENT
Start: 2021-11-13 | End: 2021-11-17 | Stop reason: HOSPADM

## 2021-11-13 RX ORDER — ASPIRIN 81 MG/1
81 TABLET, CHEWABLE ORAL DAILY
Status: DISCONTINUED | OUTPATIENT
Start: 2021-11-13 | End: 2021-11-15

## 2021-11-13 RX ORDER — BUPROPION HYDROCHLORIDE 100 MG/1
150 TABLET ORAL EVERY MORNING
Status: DISCONTINUED | OUTPATIENT
Start: 2021-11-13 | End: 2021-11-15

## 2021-11-13 RX ORDER — FAMOTIDINE 20 MG/1
20 TABLET, FILM COATED ORAL DAILY
Status: DISCONTINUED | OUTPATIENT
Start: 2021-11-14 | End: 2021-11-14

## 2021-11-13 RX ORDER — ACETAMINOPHEN 325 MG/1
650 TABLET ORAL EVERY 4 HOURS PRN
Status: DISCONTINUED | OUTPATIENT
Start: 2021-11-13 | End: 2021-11-15

## 2021-11-13 RX ADMIN — SODIUM CHLORIDE, POTASSIUM CHLORIDE, SODIUM LACTATE AND CALCIUM CHLORIDE 500 ML: 600; 310; 30; 20 INJECTION, SOLUTION INTRAVENOUS at 16:23

## 2021-11-13 RX ADMIN — FAMOTIDINE 20 MG: 10 INJECTION INTRAVENOUS at 05:11

## 2021-11-13 RX ADMIN — NOREPINEPHRINE BITARTRATE 25 MCG/MIN: 1 INJECTION, SOLUTION, CONCENTRATE INTRAVENOUS at 00:20

## 2021-11-13 RX ADMIN — DEXMEDETOMIDINE 0.5 MCG/KG/HR: 200 INJECTION, SOLUTION INTRAVENOUS at 20:25

## 2021-11-13 RX ADMIN — ACETAMINOPHEN 650 MG: 325 TABLET, FILM COATED ORAL at 09:30

## 2021-11-13 RX ADMIN — VASOPRESSIN 0.03 UNITS/MIN: 20 INJECTION INTRAVENOUS at 20:24

## 2021-11-13 RX ADMIN — NOREPINEPHRINE BITARTRATE 16 MCG/MIN: 1 INJECTION, SOLUTION, CONCENTRATE INTRAVENOUS at 13:35

## 2021-11-13 RX ADMIN — TOPIRAMATE 75 MG: 25 TABLET, FILM COATED ORAL at 17:17

## 2021-11-13 RX ADMIN — DEXMEDETOMIDINE 0.7 MCG/KG/HR: 200 INJECTION, SOLUTION INTRAVENOUS at 03:11

## 2021-11-13 RX ADMIN — Medication 200 MCG/HR: at 09:35

## 2021-11-13 RX ADMIN — ESCITALOPRAM OXALATE 10 MG: 10 TABLET ORAL at 17:18

## 2021-11-13 RX ADMIN — FENTANYL CITRATE 100 MCG: 50 INJECTION, SOLUTION INTRAMUSCULAR; INTRAVENOUS at 17:59

## 2021-11-13 RX ADMIN — VASOPRESSIN 0.03 UNITS/MIN: 20 INJECTION INTRAVENOUS at 09:26

## 2021-11-13 RX ADMIN — CEFTRIAXONE SODIUM 1 G: 1 INJECTION, POWDER, FOR SOLUTION INTRAMUSCULAR; INTRAVENOUS at 05:13

## 2021-11-13 RX ADMIN — SODIUM BICARBONATE 100 MEQ: 84 INJECTION, SOLUTION INTRAVENOUS at 00:27

## 2021-11-13 RX ADMIN — NOREPINEPHRINE BITARTRATE 25 MCG/MIN: 1 INJECTION, SOLUTION, CONCENTRATE INTRAVENOUS at 05:13

## 2021-11-13 RX ADMIN — EPINEPHRINE 6 MCG/MIN: 1 INJECTION INTRAMUSCULAR; INTRAVENOUS; SUBCUTANEOUS at 00:30

## 2021-11-13 RX ADMIN — ATORVASTATIN CALCIUM 40 MG: 40 TABLET, FILM COATED ORAL at 17:18

## 2021-11-13 RX ADMIN — DOXYCYCLINE 100 MG: 100 INJECTION, POWDER, LYOPHILIZED, FOR SOLUTION INTRAVENOUS at 05:53

## 2021-11-13 RX ADMIN — BUPROPION HYDROCHLORIDE 150 MG: 100 TABLET, FILM COATED ORAL at 10:36

## 2021-11-13 RX ADMIN — NOREPINEPHRINE BITARTRATE 30 MCG/MIN: 1 INJECTION, SOLUTION, CONCENTRATE INTRAVENOUS at 00:26

## 2021-11-13 RX ADMIN — DOXYCYCLINE 100 MG: 100 INJECTION, POWDER, LYOPHILIZED, FOR SOLUTION INTRAVENOUS at 17:17

## 2021-11-13 RX ADMIN — ASPIRIN 81 MG: 81 TABLET, CHEWABLE ORAL at 10:36

## 2021-11-13 RX ADMIN — HYDROCORTISONE SODIUM SUCCINATE 50 MG: 100 INJECTION, POWDER, FOR SOLUTION INTRAMUSCULAR; INTRAVENOUS at 17:18

## 2021-11-13 RX ADMIN — SENNOSIDES AND DOCUSATE SODIUM 2 TABLET: 50; 8.6 TABLET ORAL at 17:18

## 2021-11-13 RX ADMIN — DEXMEDETOMIDINE 1 MCG/KG/HR: 200 INJECTION, SOLUTION INTRAVENOUS at 09:20

## 2021-11-13 RX ADMIN — HYDROCORTISONE SODIUM SUCCINATE 50 MG: 100 INJECTION, POWDER, FOR SOLUTION INTRAMUSCULAR; INTRAVENOUS at 10:38

## 2021-11-13 RX ADMIN — HEPARIN SODIUM 1900 UNITS: 1000 INJECTION INTRAVENOUS; SUBCUTANEOUS at 00:50

## 2021-11-13 RX ADMIN — SODIUM CHLORIDE, POTASSIUM CHLORIDE, SODIUM LACTATE AND CALCIUM CHLORIDE 500 ML: 600; 310; 30; 20 INJECTION, SOLUTION INTRAVENOUS at 14:55

## 2021-11-13 ASSESSMENT — PAIN DESCRIPTION - PAIN TYPE
TYPE: ACUTE PAIN

## 2021-11-13 NOTE — CONSULTS
Reason for Consult:  Asked by Dr sIaiah Squires and Jaxson Oviedo to see this patient with inferolateral STEMI      CC: Chest discomfort for days    HPI: This is a 66-year-old woman with a history of COPD, hypertension, dyslipidemia, likely tobacco and alcohol use or abuse who presents from the outside hospital as a STEMI transfer.  She had presented outside hospital this afternoon with days of chest pain perhaps up to 8 days perhaps worsened in the last couple days in the anterior left chest and shortness of breath.  She had remarkable ST elevation in the lateral and inferior leads and was giving thrombolytics aspirin nitro and transferred here in route she received some fentanyl for chest discomfort when she arrived here she is not complaining of chest pain but over the course of the first 30 minutes had a decline in her orientation status which is new and possible difficulty with peripheral sensation of pain although her motor function has been intact.  She also became more delirious not able to answer questions fully and appropriately but awake.  She is no longer oriented where she was reported to be clearly oriented post lytics    Medications / Drug list prior to admission:  No current facility-administered medications on file prior to encounter.     No current outpatient medications on file prior to encounter.       Current list of administered Medications:    Current Facility-Administered Medications:   •  heparin infusion 25,000 units in 500 mL 0.45% NACL, 0-30 Units/kg/hr (Adjusted), Intravenous, Continuous, Isaiah Beach M.D., Last Rate: 15.2 mL/hr at 11/12/21 1710, 12 Units/kg/hr at 11/12/21 1710  •  heparin injection 1,900 Units, 30 Units/kg (Adjusted), Intravenous, PRN, Isaiah Beach M.D.  No current outpatient medications on file.    Past Medical History:   Diagnosis Date   • COPD (chronic obstructive pulmonary disease) (HCC)    • Hypertension    • ST elevation myocardial infarction  "involving left anterior descending (LAD) coronary artery ([facility]) 2021   • ST elevation myocardial infarction involving left anterior descending (LAD) coronary artery (HCC) 2021       History reviewed. No pertinent surgical history.    Family History   Problem Relation Age of Onset   • Heart Disease Father 64         of MI     Patient family history was personally reviewed, no pertinent family history to current presentation    Social History     Tobacco Use   • Smoking status: Former Smoker   • Smokeless tobacco: Never Used   Substance Use Topics   • Alcohol use: Yes     Comment: reportedly   • Drug use: Not on file       ALLERGIES:  Not on File    Review of systems:  A complete review of symptoms was not able to be obtained given her clinical status and condition of medical status    Physical exam:  Patient Vitals for the past 24 hrs:   Height Weight   21 1700 1.6 m (5' 3\") 80 kg (176 lb 5.9 oz)   Heart rate normal blood pressure was 156/60 she is 99% on supplemental oxygen weight is reported as 80 kg  General: No acute distress.   EYES: no jaundice  HEENT: OP clear   Neck:  No JVD.   CVS: Regular no murmur  Resp: She has abnormal respiratory pattern concerning for CNS process  Abdomen: Soft, ND, obesity  Skin: Grossly nothing acute no obvious rashes  Neurological: Awake not orientable, Moves all extremities, no cranial nerve defects on limited exam she did have diminished withdrawal to pain in all 4 extremities  Extremities:   No edema. No cyanosis.       Data:  Laboratory studies personally reviewed by me:  No results found for this or any previous visit (from the past 24 hour(s)).    Imaging:  EC-ECHOCARDIOGRAM LTD W/O CONT         CT-HEAD W/O   Final Result    Limited examination.   1.  No acute intracranial abnormality is identified.   2.  Mild left frontal scalp swelling.      DX-CHEST-LIMITED (1 VIEW)   Final Result      1.  Small right pleural effusion with overlying " atelectasis/consolidation.   2.  Segmental left lower lobe atelectasis.   3.  Mild pulmonary vascular congestion.   4.  Mild cardiomegaly.      CL-LEFT HEART CATHETERIZATION WITH POSSIBLE INTERVENTION    (Results Pending)           EKG tracings personally reviewed by me sinus rhythm with remarkable ST elevations in lead I to III aVF and the lateral precordial leads without dramatic Q waves low-voltage though    Echocardiogram images personally reviewed by me show hyperdynamic basal mid segment with akinesis of the apical segments no thinning no significant valve disease limited evaluation due to available positioning and emergent nature of the pictures    All pertinent features of laboratory and imaging reviewed including primary images where applicable      Principal Problem:    ST elevation myocardial infarction involving left anterior descending (LAD) coronary artery (HCC) POA: Yes  Active Problems:    Disorientation POA: Unknown    Primary hypertension (Chronic) POA: Yes    Dyslipidemia (Chronic) POA: Unknown  Resolved Problems:    * No resolved hospital problems. *      Assessment / Plan:  ST elevation likely of the LAD artery given her echocardiogram and EKG unfortunately she has developed disorientation and possible other neurologic deficits which is quite concerning for an acute thrombotic or hemorrhagic effect given she received lytics today at 1320 p.m. at the outside hospital.  She is received appropriate care for her heart attack but given the concern for acute stroke with the possibility of intracranial hemorrhage we will defer taking her immediately to the catheterization labs until she has had full work-up of her stroke as anticoagulation required for evaluation of her heart attack may in fact cause intracranial hemorrhage and is a certain risk.  At the same time she has clear infarct based on EKG and echocardiogram and given her constellation of symptoms over days may be late presenting.  There is a  remote chance that this is TakoTsubo syndrome and that the current neurologic symptoms could be related to small left ventricular thrombus given the akinesis of the apex in that case she has received appropriate therapy with systemic thrombolysis and heparin.    She will be going through a code stroke activation      I personally discussed her case with Isaiah Squires and Jaxson Oviedo    It is my pleasure to participate in the care of Ms. Prieto.  Please do not hesitate to contact me with questions or concerns.    Isaiah Beach MD PhD [facility]  Cardiologist [facility]    11/12/2021    Please note that this dictation was created using voice recognition software. There may be errors I did not discover before finalizing the note.    Stella Prieto is critically ill and she is at high risk for clinical deterioration, worsening vital organ dysfunction, and death without the above critical care interventions.  Critical care time history is 90 minutes due to acute myocardial infarction STEMI. No time overlap. Procedures were not included in this time. This time was spent at the bedside evaluating the patient's chart, their labs,   imaging, physical exam and discussion with multiple team members including ICU pharmacy neurology as well as the bedside nurse, pharmacy and her daughter over the phone  and formulating an assessment and plan.

## 2021-11-13 NOTE — CARE PLAN
The patient is Unstable - High likelihood or risk of patient condition declining or worsening         Progress made toward(s) clinical / shift goals:    Problem: Knowledge Deficit - Standard  Goal: Patient and family/care givers will demonstrate understanding of plan of care, disease process/condition, diagnostic tests and medications  Outcome: Progressing  Note: Patient's daughter updated on POC>      Problem: Safety - Medical Restraint  Goal: Remains free of injury from restraints (Restraint for Interference with Medical Device)  Outcome: Progressing  Flowsheets (Taken 11/13/2021 0110)  Addressed this shift: Remains free of injury from restraints (restraint for interference with medical device): Every 2 hours: Monitor safety, psychosocial status, comfort, nutrition and hydration  Note: Patient in restraints in order to protect life saving device.

## 2021-11-13 NOTE — PROGRESS NOTES
Munir from Lab called with critical result of WBC 66.7 at 2242. Critical lab result read back to Munir.   Dr. Bowles notified of critical lab result at 2243.  Critical lab result read back by Dr. Bowles.

## 2021-11-13 NOTE — PROCEDURES
"Arterial Line Insertion    Date/Time: 11/12/2021 10:48 PM  Performed by: COLEMAN Dillard.  Authorized by: SOPHIE Dillard   Consent: The procedure was performed in an emergent situation.  Risks and benefits: risks, benefits and alternatives were discussed  Patient identity confirmed: arm band and anonymous protocol, patient vented/unresponsive  Time out: Immediately prior to procedure a \"time out\" was called to verify the correct patient, procedure, equipment, support staff and site/side marked as required.  Preparation: Patient was prepped and draped in the usual sterile fashion.  Indications: multiple ABGs, respiratory failure and hemodynamic monitoring  Location: right radial    Sedation:  Patient sedated: yes  Sedation type: (Precedex drip)  Analgesia: see MAR for details  Vitals: Vital signs were monitored during sedation.    Arnulfo's test normal: yes  Needle gauge: 20  Seldinger technique: Seldinger technique used  Number of attempts: 1  Post-procedure: line sutured and dressing applied  Post-procedure CMS: normal  Patient tolerance: patient tolerated the procedure well with no immediate complications              "

## 2021-11-13 NOTE — CONSULTS
Critical Care Consultation    Date of consult: 11/12/2021    Referring Physician  Jaxson Panda M.D.    Reason for Consultation  STEMI, altered LOC & respiratory failure    History of Presenting Illness  66 y.o. female history of COPD, diabetes and hypertension who presented 11/12/2021 with chest pain to [facility], ER.  EKG was consistent with STEMI and patient was given TNKase, heparin, nitro, aspirin and then sent to our facility for possible coronary artery angiogram.  Daughter gave a history of cough congestion and bronchitis-like symptoms for a week or so prior to admission.  She was given ceftriaxone prior to transfer for possible right lower lobe pneumonia.  On arrival here she is altered and had left-sided plegia and code stroke was called.  She had significant carotid stenosis but no intervention was suggested by exam or other images after neurology review.  However the stenosis was bad enough where neurology requested a blood pressure 120-140 range if possible since she neurologically was better at that higher pressure presumably due to better flow across the right carotid stenotic region.  I arrived to see her in the ER and she had some labored breathing and was altered for me and blood gas was being obtained that time and her PCO2 was in the 80s.  She was intubated and a central line was placed emergently.  She dropped her blood pressure after intubation.  500 cc fluid bolus was given and repeated x1 after reviewing the initial echocardiogram with cardiology who felt she was on the dry side by imaging.  Norepinephrine infusion was initiated and and initially blood pressure stabilized in the 120s on norepinephrine at 12.  See was subsequently moved to the [facility] and put tension persisted requiring additional pressors after norepinephrine was maxed at 30.  Follow-up EKG actually looked a little bit better and follow-up echocardiogram was unchanged.  Lactic acid was normal at 1.8.  Cardiology and I  reviewed the case at length and our presumption is this is a neurogenic hemodynamic process.  The addition of epinephrine raised her heart rate but really did not help her blood pressure.  Transiently when her blood pressure was 140 she actually was spontaneously moving her right side and trying to open her eyes.  Currently adding vasopressin and will try Jem-Synephrine as well in hopes to wean off epinephrine and get her heart rate back down in the face of a STEMI.  Reviewed the option of stenting of coronary or stenting the carotid with cardiology and neurology and both think the risk outweigh the benefits at this time.  Patient is currently post TNK on a heparin drip.      Code Status  Full Code    Review of Systems  Review of Systems   Unable to perform ROS: Acuity of condition       Past Medical History   has a past medical history of COPD (chronic obstructive pulmonary disease) ([facility]), Diabetes ([facility]), Hypertension, ST elevation myocardial infarction involving left anterior descending (LAD) coronary artery ([facility]) (11/12/2021), and ST elevation myocardial infarction involving left anterior descending (LAD) coronary artery ([facility]) (11/12/2021). She also has no past medical history of Stroke (Formerly [facility]).    Surgical History   has no past surgical history on file.    Family History  family history includes Heart Disease (age of onset: 64) in her father.    Social History   reports that she has quit smoking. She has never used smokeless tobacco. She reports current alcohol use.    Medications  Home Medications     Reviewed by Bruno De Luna (Pharmacy Tech) on 11/12/21 at 1924  Med List Status: Complete   Medication Last Dose Status   albuterol 108 (90 Base) MCG/ACT Aero Soln inhalation aerosol unk Active   buPROPion (WELLBUTRIN) 75 MG Tab unk Active   escitalopram (LEXAPRO) 10 MG Tab unk Active   ipratropium-albuterol (COMBIVENT RESPIMAT)  MCG/ACT Aero Soln unk Active   LORazepam (ATIVAN) 0.5 MG Tab unk Active    predniSONE (DELTASONE) 20 MG Tab unk Active   tiotropium (SPIRIVA RESPIMAT) 2.5 mcg/Act Aero Soln unk Active   topiramate (TOPAMAX) 25 MG Tab unk Active   traZODone (DESYREL) 100 MG Tab unk Active              Current Facility-Administered Medications   Medication Dose Route Frequency Provider Last Rate Last Admin   • heparin infusion 25,000 units in 500 mL 0.45% NACL  0-30 Units/kg/hr (Adjusted) Intravenous Continuous Isaiah Beach M.D. 15.2 mL/hr at 11/12/21 2300 12 Units/kg/hr at 11/12/21 2300   • heparin injection 1,900 Units  30 Units/kg (Adjusted) Intravenous PRN Isaiah Beach M.D.       • fentaNYL (SUBLIMAZE) injection 50 mcg  50 mcg Intravenous Q15 MIN PRN Amrit Bwoles M.D.        And   • fentaNYL (SUBLIMAZE) injection 100 mcg  100 mcg Intravenous Q15 MIN PRN Amrit Bowles M.D.   100 mcg at 11/12/21 1916    And   • fentaNYL (SUBLIMAZE) 50 mcg/mL in 50mL (Continuous Infusion)   Intravenous Continuous Amrit Bowles M.D. 1 mL/hr at 11/12/21 2315 50 mcg/hr at 11/12/21 2315    And   • dexmedetomidine (PRECEDEX) 400 mcg/100mL NS premix infusion  0-0.7 mcg/kg/hr Intravenous Continuous Amrit Bowles M.D. 14 mL/hr at 11/12/21 2317 0.7 mcg/kg/hr at 11/12/21 2317   • norepinephrine (Levophed) 8 mg in 250 mL NS infusion (premix)  0-30 mcg/min Intravenous Continuous Amrit Bowles M.D. 56.3 mL/hr at 11/13/21 0026 30 mcg/min at 11/13/21 0026   • topiramate (TOPAMAX) tablet 75 mg  75 mg Oral Q EVENING Amrit Bowles M.D.       • famotidine (PEPCID) tablet 20 mg  20 mg Enteral Tube Q12HRS Amrit Bowles M.D.        Or   • famotidine (PEPCID) injection 20 mg  20 mg Intravenous Q12HRS Amrit B Richeson, M.D.       • senna-docusate (PERICOLACE or SENOKOT S) 8.6-50 MG per tablet 2 Tablet  2 Tablet Enteral Tube BID Amrit Bowles M.D.        And   • polyethylene glycol/lytes (MIRALAX) PACKET 1 Packet  1 Packet Enteral Tube QDAY PRN Amrit Bowles M.D.        And   •  magnesium hydroxide (MILK OF MAGNESIA) suspension 30 mL  30 mL Enteral Tube QDAY PRN Amrit Bowles M.D.        And   • bisacodyl (DULCOLAX) suppository 10 mg  10 mg Rectal QDAY PRN Amrit Bowles M.D.       • MD Alert...ICU Electrolyte Replacement per Pharmacy   Other PHARMACY TO DOSE Amrit Bowles M.D.       • lidocaine (XYLOCAINE) 1 % injection 2 mL  2 mL Tracheal Tube Q30 MIN PRN Amrit Bowles M.D.       • Respiratory Therapy Consult   Nebulization Continuous RT Amrit Bowles M.D.       • ipratropium-albuterol (DUONEB) nebulizer solution  3 mL Nebulization Q2HRS PRN (RT) Amrit Bowles M.D.   3 mL at 11/12/21 2313   • cefTRIAXone (Rocephin) 1 g in  mL IVPB  1 g Intravenous Q24HRS Amrit Bowles M.D.       • doxycycline (VIBRAMYCIN) 100 mg in  mL IVPB  100 mg Intravenous Q12HRS Amrit Bowles M.D.   Stopped at 11/12/21 2234   • EPINEPHrine (Adrenalin) infusion 4 mg/250 mL (premix)  0-10 mcg/min Intravenous Continuous Amrit Bowles M.D. 18.8 mL/hr at 11/13/21 0056 5 mcg/min at 11/13/21 0056   • vasopressin (VASOSTRICT) 20 Units in  mL Infusion  0.03 Units/min Intravenous Continuous Amrit Bowles M.D. 9 mL/hr at 11/12/21 2344 0.03 Units/min at 11/12/21 2344   • phenylephrine (IVETTE-SYNEPHRINE) 100 mcg/mL inj (IV Push Syringe) 100 mcg  100 mcg Intravenous Q15 MIN PRN Amrit Bowles M.D.           Allergies  No Known Allergies    Vital Signs last 24 hours  Temp:  [35.9 °C (96.6 °F)-36.7 °C (98 °F)] 35.9 °C (96.6 °F)  Pulse:  [] 89  Resp:  [16-45] 24  BP: ()/(39-89) 119/72  SpO2:  [92 %-99 %] 97 %    Physical Exam  Physical Exam  Constitutional:       Appearance: She is obese. She is ill-appearing. She is not diaphoretic.      Interventions: She is sedated, intubated and restrained.   HENT:      Head: Normocephalic and atraumatic.      Mouth/Throat:      Mouth: Mucous membranes are dry.   Eyes:      General: No scleral icterus.     Pupils:  Pupils are equal, round, and reactive to light.   Neck:      Vascular: No JVD.   Cardiovascular:      Rate and Rhythm: Normal rate and regular rhythm.  No extrasystoles are present.     Heart sounds: No murmur heard.  No gallop.       Comments: SR/ST  Pulmonary:      Effort: She is intubated.      Breath sounds: Examination of the right-lower field reveals decreased breath sounds. Decreased breath sounds, rhonchi and rales present. No wheezing.   Abdominal:      General: Abdomen is protuberant. Bowel sounds are normal. There is no distension.      Palpations: Abdomen is soft. There is no hepatomegaly, splenomegaly or mass.      Tenderness: There is no abdominal tenderness. There is no guarding. Negative signs include Mayers's sign and McBurney's sign.      Hernia: No hernia is present.   Genitourinary:     Comments: Asha  Musculoskeletal:      Cervical back: Neck supple. No rigidity.      Right lower leg: No edema.      Left lower leg: No edema.   Lymphadenopathy:      Cervical: No cervical adenopathy.   Skin:     General: Skin is cool and dry.      Capillary Refill: Capillary refill takes 2 to 3 seconds.      Coloration: Skin is not cyanotic or mottled.      Nails: There is no clubbing.   Neurological:      Mental Status: She is lethargic.      GCS: GCS eye subscore is 1. GCS verbal subscore is 1. GCS motor subscore is 4.      Cranial Nerves: Cranial nerves are intact.   Psychiatric:      Comments: Unable to assess         Fluids    Intake/Output Summary (Last 24 hours) at 11/13/2021 0114  Last data filed at 11/13/2021 0000  Gross per 24 hour   Intake 495.9 ml   Output 425 ml   Net 70.9 ml       Laboratory  Recent Results (from the past 48 hour(s))   aPTT    Collection Time: 11/12/21  4:49 PM   Result Value Ref Range    APTT 102.3 (HH) 24.7 - 36.0 sec   Prothrombin Time    Collection Time: 11/12/21  4:49 PM   Result Value Ref Range    PT 14.7 (H) 12.0 - 14.6 sec    INR 1.19 (H) 0.87 - 1.13   Heparin Xa  (Unfractionated)    Collection Time: 21  4:49 PM   Result Value Ref Range    Heparin Xa (UFH) 0.61 IU/mL   EKG    Collection Time: 21  4:49 PM   Result Value Ref Range    Report       [facility] Emergency Dept.    Test Date:  2021  Pt Name:    QUINN RAMIREZ                  Department: 161  MRN:        [id number]                      Room:       [facility]  Gender:     Female                       Technician: HRR  :        [id number]                   Requested By:ANGIE GUERIN  Order #:    [id number]                    Reading MD:    Measurements  Intervals                                Axis  Rate:       108                          P:          69  MN:         167                          QRS:        56  QRSD:       94                           T:          64  QT:         350  QTc:        471    Interpretive Statements  Sinus tachycardia  Anterolateral infarct, acute  No previous ECG available for comparison     EC-ECHOCARDIOGRAM LTD W/O CONT    Collection Time: 21  5:30 PM   Result Value Ref Range    Eject.Frac. MOD BP 57.15     Eject.Frac. MOD 4C 68.32     Eject.Frac. MOD 2C 42.51     Left Ventrical Ejection Fraction 75    POCT venous blood gas device results    Collection Time: 21  6:31 PM   Result Value Ref Range    Ph 7.139 (L) 7.310 - 7.450    Pco2 80.2 (H) 41.0 - 51.0 mmHg    Po2 94 (H) 25 - 40 mmHg    Tco2 30 20 - 33 mmol/L    SO2 94 %    Hco3 27.2 24.0 - 28.0 mmol/L    BE -4 -4 - 3 mmol/L    Body Temp 36.7 C degrees    Ph Temp Correc 7.143 (L) 7.310 - 7.450    Pco2 Temp Samuel 79.2 (H) 41.0 - 51.0 mmHg    Po2 Temp Corre 92 (H) 25 - 40 mmHg    Specimen Venous    POCT hematocrit and hemoglobin device results    Collection Time: 21  6:31 PM   Result Value Ref Range    Istat Hematocrit 44 37 - 47 %    Istat Hemoglobin 15.0 12.0 - 16.0 g/dL   EC-ECHOCARDIOGRAM LTD W/O CONT    Collection Time: 21  9:30 PM   Result Value Ref Range    Left Ventrical Ejection  Fraction 75    Heparin Anti-Xa    Collection Time: 11/12/21  9:53 PM   Result Value Ref Range    Heparin Xa (UFH) 0.33 IU/mL   Basic Metabolic Panel    Collection Time: 11/12/21  9:53 PM   Result Value Ref Range    Sodium 131 (L) 135 - 145 mmol/L    Potassium 4.9 3.6 - 5.5 mmol/L    Chloride 96 96 - 112 mmol/L    Co2 21 20 - 33 mmol/L    Glucose 176 (H) 65 - 99 mg/dL    Bun 42 (H) 8 - 22 mg/dL    Creatinine 1.00 0.50 - 1.40 mg/dL    Calcium 8.4 (L) 8.5 - 10.5 mg/dL    Anion Gap 14.0 7.0 - 16.0   MAGNESIUM    Collection Time: 11/12/21  9:53 PM   Result Value Ref Range    Magnesium 2.2 1.5 - 2.5 mg/dL   CBC WITH DIFFERENTIAL    Collection Time: 11/12/21  9:53 PM   Result Value Ref Range    WBC 66.5 (HH) 4.8 - 10.8 K/uL    RBC 4.06 (L) 4.20 - 5.40 M/uL    Hemoglobin 12.3 12.0 - 16.0 g/dL    Hematocrit 38.5 37.0 - 47.0 %    MCV 94.8 81.4 - 97.8 fL    MCH 30.3 27.0 - 33.0 pg    MCHC 31.9 (L) 33.6 - 35.0 g/dL    RDW 49.7 35.9 - 50.0 fL    Platelet Count 617 (H) 164 - 446 K/uL    MPV 9.7 9.0 - 12.9 fL    Neutrophils-Polys 79.50 (H) 44.00 - 72.00 %    Lymphocytes 4.10 (L) 22.00 - 41.00 %    Monocytes 3.30 0.00 - 13.40 %    Eosinophils 0.00 0.00 - 6.90 %    Basophils 0.00 0.00 - 1.80 %    Nucleated RBC 0.10 /100 WBC    Neutrophils (Absolute) 53.93 (H) 2.00 - 7.15 K/uL    Lymphs (Absolute) 2.73 1.00 - 4.80 K/uL    Monos (Absolute) 2.19 (H) 0.00 - 0.85 K/uL    Eos (Absolute) 0.00 0.00 - 0.51 K/uL    Baso (Absolute) 0.00 0.00 - 0.12 K/uL    NRBC (Absolute) 0.05 K/uL    Anisocytosis 1+     Macrocytosis 1+    LACTIC ACID    Collection Time: 11/12/21  9:53 PM   Result Value Ref Range    Lactic Acid 1.1 0.5 - 2.0 mmol/L   PROCALCITONIN    Collection Time: 11/12/21  9:53 PM   Result Value Ref Range    Procalcitonin 4.39 (H) <0.25 ng/mL   proBrain Natriuretic Peptide, NT    Collection Time: 11/12/21  9:53 PM   Result Value Ref Range    NT-proBNP 73150 (H) 0 - 125 pg/mL   TROPONIN    Collection Time: 11/12/21  9:53 PM   Result Value  Ref Range    Troponin T 455 (H) 6 - 19 ng/L   ESTIMATED GFR    Collection Time: 21  9:53 PM   Result Value Ref Range    GFR If African American >60 >60 mL/min/1.73 m 2    GFR If Non  56 (A) >60 mL/min/1.73 m 2   PLATELET ESTIMATE    Collection Time: 21  9:53 PM   Result Value Ref Range    Plt Estimation Increased    MORPHOLOGY    Collection Time: 21  9:53 PM   Result Value Ref Range    RBC Morphology Present     Large Platelets 1+    PERIPHERAL SMEAR REVIEW    Collection Time: 21  9:53 PM   Result Value Ref Range    Peripheral Smear Review see below    DIFFERENTIAL MANUAL    Collection Time: 21  9:53 PM   Result Value Ref Range    Bands-Stabs 1.60 0.00 - 10.00 %    Metamyelocytes 4.90 %    Myelocytes 6.60 %    Manual Diff Status PERFORMED    EKG    Collection Time: 21 10:53 PM   Result Value Ref Range    Report       [facility]    Test Date:  2021  Pt Name:    QUINN RAMIREZ                  Department: [facility]  MRN:        [id number]                      Room:       [facility]  Gender:     Female                       Technician: EDITH  :        1955                   Requested By:ANGIE GUERIN  Order #:    [id number]                    Reading MD:    Measurements  Intervals                                Axis  Rate:       94                           P:          76  DC:         140                          QRS:        40  QRSD:       84                           T:          61  QT:         416  QTc:        521    Interpretive Statements  SINUS RHYTHM  ST ELEVATION, PROBABLE LATERAL INJURY  ST ELEVATION, CONSIDER ANTERIOR INJURY  PROLONGED QT INTERVAL  Compared to ECG 2021 16:49:20  ST (T wave) deviation now present  Prolonged QT interval now present  Sinus tachycardia no longer present  Myocardial infarct finding still present         Imaging  EC-ECHOCARDIOGRAM LTD W/O CONT   Final Result      DX-ABDOMEN FOR TUBE PLACEMENT   Final Result      NG tube  tip projects over the stomach.      DX-CHEST-PORTABLE (1 VIEW)   Final Result      1.  No pneumothorax following right IJ central catheter placement. Well-positioned ETT and central line.   2.  Bibasilar atelectasis versus consolidation and associated small to moderate right pleural effusion.      EC-ECHOCARDIOGRAM LTD W/O CONT   Final Result      CT-HEAD W/O   Final Result    Limited examination.   1.  No acute intracranial abnormality is identified.   2.  Mild left frontal scalp swelling.      DX-CHEST-LIMITED (1 VIEW)   Final Result      1.  Small right pleural effusion with overlying atelectasis/consolidation.   2.  Segmental left lower lobe atelectasis.   3.  Mild pulmonary vascular congestion.   4.  Mild cardiomegaly.      CL-LEFT HEART CATHETERIZATION WITH POSSIBLE INTERVENTION    (Results Pending)   CT-CTA HEAD WITH & W/O-POST PROCESS    (Results Pending)   CT-CEREBRAL PERFUSION ANALYSIS    (Results Pending)   CT-CTA NECK WITH & W/O-POST PROCESSING    (Results Pending)   DX-CHEST-PORTABLE (1 VIEW)    (Results Pending)       Assessment/Plan  * ST elevation myocardial infarction involving left anterior descending (LAD) coronary artery (HCC)- (present on admission)  Assessment & Plan  Status post ASA/TNK at [facility] 11/12  Initially was on nitroglycerin but this was stopped due to hypotension  Echo with preserved EF but LAD distribution wall motion consistent with MI  Repeat echo on arrival to [facility] unchanged  EKG follow-up evolving/improved, reviewed with cardiology  BNP over 20 K  Continuing aspirin/heparin infusion  Possible Cath Lab coronary artery intervention in a.m.    Altered level of consciousness  Assessment & Plan  Likely multifactorial  Significant R carotid stenosis per neurology, CTA head and neck report not back yet  Significant hypercarbia and secondary respiratory acidosis  Neuro exam seems proved with higher blood pressure making carotid obstructive process likely contributor  Neurology  recommended systolic blood pressure 120 if not 140 if possible  CT head negative  No neuro intervention per neurology  Patient status post TNK for STEMI  Consider follow-up CT scan or MRI in a.m. if stable enough to go to radiology  Intervention for carotid artery?    Acute respiratory failure with hypoxia and hypercapnia (HCC)  Assessment & Plan  Altered and very hypercarbic in ER and intubated 11/12  Not bronchospastic and may very well have a right lower lobe pneumonia associated with a small effusion  RT protocol/ventilator bundle  Treat pneumonia/COPD-add steroids if becomes bronchospastic and its not heart failure  Monitor for the need for forced diuresis with Lasix  SBT when clinically appropriate-neither are appropriate at this time  With hypotension will sedate with dexmedetomidine and fentanyl, SAT per protocol    Shock (HCC)  Assessment & Plan  Indeterminate  Initially with NSTEMI cardiogenic shock suspected but echo twice reveals good ejection fraction.  Lactic acid normal and although she may very well have right lower lobe pneumonia and were treating for it I do not believe this is septic shock.  Volume a little down initially by exam and imaging from myself with bedside ultrasound as well as cardiology.  CVP now low teens after 2 500 cc fluid boluses.  Reviewed the possibility of neurogenic shock/vasoplegia with cardiology and neurology.  Will attempt to maintain blood pressure 120 if not 140 if possible to support perfusion to her right hemisphere.  Actively titrating norepinephrine  Adding vasopressin/Jem-Synephrine to wean down or off epinephrine since did not seem to help blood pressure and she just got more tachycardic    Leukocytosis  Assessment & Plan  Marked elevated WBC, question leukemoid reaction  Determinate shock as outlined above  Cultures pending and empiric antibiotic regimen started for pneumonia  Nothing to suggest GI process at this time  Serial CBC    Pneumonia due to infectious  organism  Assessment & Plan  Right lower lobe pneumonia/atelectasis/effusion by chest x-ray  Started on ceftriaxone at [facility], continuing ceftriaxone and adding doxycycline  Sputum culture and blood culture x2 requested, check for culture results from [place] over the next few days  Procalcitonin to be trended    Prolonged Q-T interval on ECG  Assessment & Plan  QTc 521  Avoid QT prolonging agents  Optimize electrolytes  Cardiac monitoring    COPD (chronic obstructive pulmonary disease) (HCC)  Assessment & Plan  Not clearly exacerbating from a bronchospasm perspective  Symptom history from daughter most suggestive of pneumonia or bronchitis this week  RT protocols  DuoNeb every 2 hours as needed  Chest x-ray in a.m.    Dyslipidemia  Assessment & Plan  Statin    Primary hypertension- (present on admission)  Assessment & Plan  Currently hypotensive, holding home regimen      Discussed patient condition and risk of morbidity and/or mortality with RN, RT, Pharmacy, cardiology and ERP.    The patient remains critically ill.  Critical care time = 125 minutes in directly providing and coordinating critical care and extensive data review.  No time overlap and excludes procedures.

## 2021-11-13 NOTE — ASSESSMENT & PLAN NOTE
Felt to be secondary to carotid stenosis/ischemia, flow dependent -improving  Neurology consulted  Continue antiplatelet, statin   maintain perfusion  PT/OT/SLP eval  MRI brain pending again today

## 2021-11-13 NOTE — ED NOTES
Patient unable to participate in interview at this time.  Med Rec completed per patient's home pharmacy ([facility])  Allergies reviewed  No ORAL antibiotics in last 30 days

## 2021-11-13 NOTE — ASSESSMENT & PLAN NOTE
Improving, secondary to ATN, nonoliguric  Avoid nephrotoxins  Monitor creatinine, urine output, electrolytes closely

## 2021-11-13 NOTE — PROGRESS NOTES
[facility] Cardiology Follow-up Consult Note  Date of note:    11/13/2021          Patient ID:  Name:   Stella Prieto     YOB: 1955  Age:   66 y.o.  female   MRN:   [id number]    Chief Complaint   Patient presents with   • Chest Pain     Patient transfered from [place] for a STEMI. Patient was given TNK, Heparin, Nitro, ASA, PTA. Patient arrived on heparin drip. Patient also received Rocephin for PNA diagnosis       Interim Events:  [Patient continued to develop worsening shock requiring rapid institution of pressors and intubation for mental status decline and hypoxia blood gas confirms respiratory acidosis.  Repeat echocardiogram shows similar function with hyperdynamic heart with apical akinesis, EKG shows improvement of prior ST segments but not resolution.  Her CVP is 15      ROS  Unable to obtain now intubated    Past medical, surgical, social, and family history reviewed and unchanged from admission except as noted in assessment and plan.    Medications: Reviewed in MAR  Current Facility-Administered Medications   Medication Dose Frequency Provider Last Rate Last Admin   • heparin infusion 25,000 units in 500 mL 0.45% NACL  0-30 Units/kg/hr (Adjusted) Continuous Isaiah Beach M.D. 15.2 mL/hr at 11/12/21 1710 12 Units/kg/hr at 11/12/21 1710   • heparin injection 1,900 Units  30 Units/kg (Adjusted) PRN Isaiah Beach M.D.       • fentaNYL (SUBLIMAZE) injection 50 mcg  50 mcg Q15 MIN PRN Amrit Bowles M.D.        And   • fentaNYL (SUBLIMAZE) injection 100 mcg  100 mcg Q15 MIN PRN Amrit Bowles M.D.   100 mcg at 11/12/21 1916    And   • fentaNYL (SUBLIMAZE) 50 mcg/mL in 50mL (Continuous Infusion)   Continuous Amrit Bowles M.D. 1 mL/hr at 11/12/21 2315 50 mcg/hr at 11/12/21 2315    And   • dexmedetomidine (PRECEDEX) 400 mcg/100mL NS premix infusion  0-0.7 mcg/kg/hr Continuous Amrit Bowles M.D. 14 mL/hr at 11/12/21 2317 0.7 mcg/kg/hr at 11/12/21 2317   • norepinephrine  (Levophed) 8 mg in 250 mL NS infusion (premix)  0-30 mcg/min Continuous Amrit Bowles M.D. 56.3 mL/hr at 11/13/21 0026 30 mcg/min at 11/13/21 0026   • topiramate (TOPAMAX) tablet 75 mg  75 mg Q EVENING Amrit Bowles M.D.       • famotidine (PEPCID) tablet 20 mg  20 mg Q12HRS Amrit Bowles M.D.        Or   • famotidine (PEPCID) injection 20 mg  20 mg Q12HRS Amrit Bowles M.D.       • senna-docusate (PERICOLACE or SENOKOT S) 8.6-50 MG per tablet 2 Tablet  2 Tablet BID Amrit Bowles M.D.        And   • polyethylene glycol/lytes (MIRALAX) PACKET 1 Packet  1 Packet QDAY PRN Amrit Bowles M.D.        And   • magnesium hydroxide (MILK OF MAGNESIA) suspension 30 mL  30 mL QDAY PRN Amrit Bowles M.D.        And   • bisacodyl (DULCOLAX) suppository 10 mg  10 mg QDAY PRN Amrit Bowles M.D.       • MD Alert...ICU Electrolyte Replacement per Pharmacy   PHARMACY TO DOSE Amrit Bowles M.D.       • lidocaine (XYLOCAINE) 1 % injection 2 mL  2 mL Q30 MIN PRN Amrit Bowles M.D.       • Respiratory Therapy Consult   Continuous RT Amrit Bowles M.D.       • ipratropium-albuterol (DUONEB) nebulizer solution  3 mL Q2HRS PRN (RT) Amrit Bowles M.D.   3 mL at 11/12/21 2313   • cefTRIAXone (Rocephin) 1 g in  mL IVPB  1 g Q24HRS Amrit Bowles M.D.       • doxycycline (VIBRAMYCIN) 100 mg in  mL IVPB  100 mg Q12HRS Amrit Bowles M.D.   Stopped at 11/12/21 2234   • EPINEPHrine (Adrenalin) infusion 4 mg/250 mL (premix)  0-10 mcg/min Continuous Amrit Bowles M.D. 33.8 mL/hr at 11/13/21 0026 9 mcg/min at 11/13/21 0026   • vasopressin (VASOSTRICT) 20 Units in  mL Infusion  0.03 Units/min Continuous Amrit Bowles M.D. 9 mL/hr at 11/12/21 2344 0.03 Units/min at 11/12/21 2344   • phenylephrine (IVETTE-SYNEPHRINE) 100 mcg/mL inj (IV Push Syringe) 100 mcg  100 mcg Q15 MIN PRN Amrit Bowles M.D.       Last reviewed on 11/12/2021  7:24 PM by Reba KENNEDY  "Manus, PhT    No Known Allergies    Physical Exam  Body mass index is 32.26 kg/m². BP (!) 92/67   Pulse (!) 101   Temp 35.9 °C (96.6 °F)   Resp (!) 24   Ht 1.6 m (5' 3\")   Wt 82.6 kg (182 lb 1.6 oz)   SpO2 93%    Vitals:    21 2300 21 2315 21 2330 21 2345   BP: 106/65 (!) 89/58 (!) 92/67    Pulse: 91 96 96 (!) 101   Resp: (!) 22 (!) 21 (!) 24 (!) 24   Temp:       SpO2: 94% 96% 95% 93%   Weight:       Height:        Oxygen Therapy:  Pulse Oximetry: 93 %, O2 (LPM): 15, FiO2%: 100 %, O2 Delivery Device: Ventilator    General: On sedation with vent  Eyes: nl conjunctiva  ENT: ET tube  Neck: Central venous cath clear dry intact  Lungs: normal respiratory effort, CTAB  Heart: RRR, no murmurs, no rubs or gallops,   EXT no edema bilateral lower extremities. + pedal pulses. no cyanosis  Abdomen: soft, non tender, non distended,  Neurological: Difficult to determine on sedation with ventilator no posturing  Psychiatric: Fully sedated on vent  Skin: Warm extremities    Labs (personally reviewed and notable for):   Recent Results (from the past 24 hour(s))   aPTT    Collection Time: 21  4:49 PM   Result Value Ref Range    APTT 102.3 (HH) 24.7 - 36.0 sec   Prothrombin Time    Collection Time: 21  4:49 PM   Result Value Ref Range    PT 14.7 (H) 12.0 - 14.6 sec    INR 1.19 (H) 0.87 - 1.13   Heparin Xa (Unfractionated)    Collection Time: 21  4:49 PM   Result Value Ref Range    Heparin Xa (UFH) 0.61 IU/mL   EKG    Collection Time: 21  4:49 PM   Result Value Ref Range    Report       [facility] Emergency Dept.    Test Date:  2021  Pt Name:    QUINN RAMIREZ                  Department: 161  MRN:        [id number]                      Room:       27  Gender:     Female                       Technician: NYLA  :        [id number]                   Requested By:ANGIE GUERIN  Order #:    [id number]                    Reading MD:    Measurements  Intervals   "                              Axis  Rate:       108                          P:          69  SD:         167                          QRS:        56  QRSD:       94                           T:          64  QT:         350  QTc:        471    Interpretive Statements  Sinus tachycardia  Anterolateral infarct, acute  No previous ECG available for comparison     INTEGRATED BIOPHARMA-ECHOCARDIOGRAM Sharetivity W/O CONT    Collection Time: 11/12/21  5:30 PM   Result Value Ref Range    Eject.Frac. MOD BP 57.15     Eject.Frac. MOD 4C 68.32     Eject.Frac. MOD 2C 42.51     Left Ventrical Ejection Fraction 75    POCT venous blood gas device results    Collection Time: 11/12/21  6:31 PM   Result Value Ref Range    Ph 7.139 (L) 7.310 - 7.450    Pco2 80.2 (H) 41.0 - 51.0 mmHg    Po2 94 (H) 25 - 40 mmHg    Tco2 30 20 - 33 mmol/L    SO2 94 %    Hco3 27.2 24.0 - 28.0 mmol/L    BE -4 -4 - 3 mmol/L    Body Temp 36.7 C degrees    Ph Temp Correc 7.143 (L) 7.310 - 7.450    Pco2 Temp Samuel 79.2 (H) 41.0 - 51.0 mmHg    Po2 Temp Corre 92 (H) 25 - 40 mmHg    Specimen Venous    POCT hematocrit and hemoglobin device results    Collection Time: 11/12/21  6:31 PM   Result Value Ref Range    Istat Hematocrit 44 37 - 47 %    Istat Hemoglobin 15.0 12.0 - 16.0 g/dL   INTEGRATED BIOPHARMA-ECHOCARDIOGRAM LTD W/O CONT    Collection Time: 11/12/21  9:30 PM   Result Value Ref Range    Left Ventrical Ejection Fraction 75    Heparin Anti-Xa    Collection Time: 11/12/21  9:53 PM   Result Value Ref Range    Heparin Xa (UFH) 0.33 IU/mL   Basic Metabolic Panel    Collection Time: 11/12/21  9:53 PM   Result Value Ref Range    Sodium 131 (L) 135 - 145 mmol/L    Potassium 4.9 3.6 - 5.5 mmol/L    Chloride 96 96 - 112 mmol/L    Co2 21 20 - 33 mmol/L    Glucose 176 (H) 65 - 99 mg/dL    Bun 42 (H) 8 - 22 mg/dL    Creatinine 1.00 0.50 - 1.40 mg/dL    Calcium 8.4 (L) 8.5 - 10.5 mg/dL    Anion Gap 14.0 7.0 - 16.0   MAGNESIUM    Collection Time: 11/12/21  9:53 PM   Result Value Ref Range    Magnesium 2.2 1.5 - 2.5  mg/dL   CBC WITH DIFFERENTIAL    Collection Time: 11/12/21  9:53 PM   Result Value Ref Range    WBC 66.5 (HH) 4.8 - 10.8 K/uL    RBC 4.06 (L) 4.20 - 5.40 M/uL    Hemoglobin 12.3 12.0 - 16.0 g/dL    Hematocrit 38.5 37.0 - 47.0 %    MCV 94.8 81.4 - 97.8 fL    MCH 30.3 27.0 - 33.0 pg    MCHC 31.9 (L) 33.6 - 35.0 g/dL    RDW 49.7 35.9 - 50.0 fL    Platelet Count 617 (H) 164 - 446 K/uL    MPV 9.7 9.0 - 12.9 fL    Neutrophils-Polys 79.50 (H) 44.00 - 72.00 %    Lymphocytes 4.10 (L) 22.00 - 41.00 %    Monocytes 3.30 0.00 - 13.40 %    Eosinophils 0.00 0.00 - 6.90 %    Basophils 0.00 0.00 - 1.80 %    Nucleated RBC 0.10 /100 WBC    Neutrophils (Absolute) 53.93 (H) 2.00 - 7.15 K/uL    Lymphs (Absolute) 2.73 1.00 - 4.80 K/uL    Monos (Absolute) 2.19 (H) 0.00 - 0.85 K/uL    Eos (Absolute) 0.00 0.00 - 0.51 K/uL    Baso (Absolute) 0.00 0.00 - 0.12 K/uL    NRBC (Absolute) 0.05 K/uL    Anisocytosis 1+     Macrocytosis 1+    LACTIC ACID    Collection Time: 11/12/21  9:53 PM   Result Value Ref Range    Lactic Acid 1.1 0.5 - 2.0 mmol/L   PROCALCITONIN    Collection Time: 11/12/21  9:53 PM   Result Value Ref Range    Procalcitonin 4.39 (H) <0.25 ng/mL   proBrain Natriuretic Peptide, NT    Collection Time: 11/12/21  9:53 PM   Result Value Ref Range    NT-proBNP 01249 (H) 0 - 125 pg/mL   TROPONIN    Collection Time: 11/12/21  9:53 PM   Result Value Ref Range    Troponin T 455 (H) 6 - 19 ng/L   ESTIMATED GFR    Collection Time: 11/12/21  9:53 PM   Result Value Ref Range    GFR If African American >60 >60 mL/min/1.73 m 2    GFR If Non  56 (A) >60 mL/min/1.73 m 2   PLATELET ESTIMATE    Collection Time: 11/12/21  9:53 PM   Result Value Ref Range    Plt Estimation Increased    MORPHOLOGY    Collection Time: 11/12/21  9:53 PM   Result Value Ref Range    RBC Morphology Present     Large Platelets 1+    PERIPHERAL SMEAR REVIEW    Collection Time: 11/12/21  9:53 PM   Result Value Ref Range    Peripheral Smear Review see below     DIFFERENTIAL MANUAL    Collection Time: 21  9:53 PM   Result Value Ref Range    Bands-Stabs 1.60 0.00 - 10.00 %    Metamyelocytes 4.90 %    Myelocytes 6.60 %    Manual Diff Status PERFORMED    EKG    Collection Time: 21 10:53 PM   Result Value Ref Range    Report       [facility]    Test Date:  2021  Pt Name:    QUINN RAMIREZ                  Department: 161  MRN:        [id number]                      Room:       T627  Gender:     Female                       Technician: EDITH  :        [id number]                   Requested By:ANGIE GUERIN  Order #:    [id number]                    Reading MD:    Measurements  Intervals                                Axis  Rate:       94                           P:          76  NM:         140                          QRS:        40  QRSD:       84                           T:          61  QT:         416  QTc:        521    Interpretive Statements  SINUS RHYTHM  ST ELEVATION, PROBABLE LATERAL INJURY  ST ELEVATION, CONSIDER ANTERIOR INJURY  PROLONGED QT INTERVAL  Compared to ECG 2021 16:49:20  ST (T wave) deviation now present  Prolonged QT interval now present  Sinus tachycardia no longer present  Myocardial infarct finding still present         Cardiac Imaging and Procedures Review:    EKG and telemetry tracings personally reviewed    Impression and Medical Decision Making:  Principal Problem:    ST elevation myocardial infarction involving left anterior descending (LAD) coronary artery (HCC) POA: Yes  Active Problems:    Disorientation POA: Unknown    Primary hypertension (Chronic) POA: Yes    Dyslipidemia (Chronic) POA: Unknown  Resolved Problems:    * No resolved hospital problems. *    ST elevation MI now with shock    The degree of her shock is surprising and not expected for her echocardiographic findings, with her neurologic changes and concern for a spinal shock possibility she is appropriate volume resuscitated and is on appropriate  pressors, will work to wean epinephrine in favor of vasopressors given the possibility of acute MI.  Her lactate and procalcitonin are high so she is being treated for pneumonia.  Kidney function is adequate but needs close monitoring    I personally discussed her case with  Dr Amrit Curry, Conor Dennison    It is my pleasure to participate in the care of Ms. Prieto.  Please do not hesitate to contact me with questions or concerns.    Isaiah Beach MD PhD [facility]  Cardiologist [facility]    11/12/2021    Please note that this dictation was created using voice recognition software. There may be errors of grammar and possibly content I did not discover before finalizing the note.     Stella Prieto is critically ill and she is at high risk for clinical deterioration, worsening vital organ dysfunction, and death without the above critical care interventions.  Critical care time 45 minutes due to shock, STEMI. No time overlap. Procedures were not included in this time. This time was spent at the bedside evaluating the patient's chart, their labs,   imaging, physical exam and discussion with the ICU team as well as the bedside nurse, the patient's daughter at the bedside and formulating an assessment and plan.

## 2021-11-13 NOTE — HOSPITAL COURSE
"\"66 y.o. female history of COPD, diabetes and hypertension who presented 11/12/2021 with chest pain to [facility], ER.  EKG was consistent with STEMI and patient was given TNKase, heparin, nitro, aspirin and then sent to our facility for possible coronary artery angiogram.  Daughter gave a history of cough congestion and bronchitis-like symptoms for a week or so prior to admission.  She was given ceftriaxone prior to transfer for possible right lower lobe pneumonia.  On arrival here she is altered and had left-sided plegia and code stroke was called.  She had significant carotid stenosis but no intervention was suggested by exam or other images after neurology review.  However the stenosis was bad enough where neurology requested a blood pressure 120-140 range if possible since she neurologically was better at that higher pressure presumably due to better flow across the right carotid stenotic region.  I arrived to see her in the ER and she had some labored breathing and was altered for me and blood gas was being obtained that time and her PCO2 was in the 80s.  She was intubated and a central line was placed emergently.  She dropped her blood pressure after intubation.  500 cc fluid bolus was given and repeated x1 after reviewing the initial echocardiogram with cardiology who felt she was on the dry side by imaging.  Norepinephrine infusion was initiated and and initially blood pressure stabilized in the 120s on norepinephrine at 12.  See was subsequently moved to the [facility] and put tension persisted requiring additional pressors after norepinephrine was maxed at 30.  Follow-up EKG actually looked a little bit better and follow-up echocardiogram was unchanged.  Lactic acid was normal at 1.8.  Cardiology and I reviewed the case at length and our presumption is this is a neurogenic hemodynamic process.  The addition of epinephrine raised her heart rate but really did not help her blood pressure.  Transiently when her " "blood pressure was 140 she actually was spontaneously moving her right side and trying to open her eyes.  Currently adding vasopressin and will try Jem-Synephrine as well in hopes to wean off epinephrine and get her heart rate back down in the face of a STEMI.  Reviewed the option of stenting of coronary or stenting the carotid with cardiology and neurology and both think the risk outweigh the benefits at this time.  Patient is currently post TNK on a heparin drip.\" - Dr. Bowles 11/12 11/14 extubated, improving L sided weakness, R thoracentesis (800mL)  "

## 2021-11-13 NOTE — DISCHARGE PLANNING
Medical Social Work     SW met with the pt daughter Esha and escorted her to the [facility] waiting room. SW provided Esha with emotional support and advised the RN that the pt daughter is waiting in the [facility] waiting room.     Plan: SW will remain available for pt and family support.

## 2021-11-13 NOTE — ASSESSMENT & PLAN NOTE
Without acute exacerbation -monitor for wheezing/bronchospasm  RT/O2 protocol  Continue as needed bronchodilators

## 2021-11-13 NOTE — PROGRESS NOTES
[facility] Cardiology Follow-up Consult Note  Date of note:    11/13/2021          Patient ID:  Name:   Stella Prieto     YOB: 1955  Age:   66 y.o.  female   MRN:   [id number]    Chief Complaint   Patient presents with   • Chest Pain     Patient transfered from [place] for a STEMI. Patient was given TNK, Heparin, Nitro, ASA, PTA. Patient arrived on heparin drip. Patient also received Rocephin for PNA diagnosis       Interim Events:  Patient is remained sedated still on pressors EKG this a.m. shows near resolution of prior ST elevations when weaned from sedation.  She may still have left-sided weakness          ROS  No NV, No Bleeding, No dizziness   All other review of systems reviewed and negative.    Past medical, surgical, social, and family history reviewed and unchanged from admission except as noted in assessment and plan.    Medications: Reviewed in MAR  Current Facility-Administered Medications   Medication Dose Frequency Provider Last Rate Last Admin   • acetaminophen (Tylenol) tablet 650 mg  650 mg Q4HRS PRN Jeremy M Gonda, M.D.   650 mg at 11/13/21 0930   • hydrocortisone sodium succinate PF (Solu-CORTEF) 100 MG injection 50 mg  50 mg Q6HRS Jeremy M Gonda, M.D.   50 mg at 11/13/21 1038   • insulin regular (HumuLIN R,NovoLIN R) injection  2-9 Units Q6HRS Jeremy M Gonda, M.D.        And   • dextrose 50% (D50W) injection 50 mL  50 mL Q15 MIN PRN Jeremy M Gonda, M.D.       • aspirin (ASA) chewable tab 81 mg  81 mg DAILY Jeremy M Gonda, M.D.   81 mg at 11/13/21 1036   • atorvastatin (LIPITOR) tablet 40 mg  40 mg Q EVENING Jeremy M Gonda, M.D.       • [START ON 11/14/2021] famotidine (PEPCID) tablet 20 mg  20 mg DAILY Jeremy M Gonda, M.D.        Or   • [START ON 11/14/2021] famotidine (PEPCID) injection 20 mg  20 mg DAILY Jeremy M Gonda, M.D.       • buPROPion (WELLBUTRIN) tablet 150 mg  150 mg QAM Jeremy M Gonda, M.D.   150 mg at 11/13/21 1036   • escitalopram (Lexapro) tablet 10 mg  10 mg Q EVENING  Jeremy M Gonda, M.D.       • topiramate (TOPAMAX) tablet 75 mg  75 mg Q EVENING Jeremy M Gonda, M.D.       • heparin infusion 25,000 units in 500 mL 0.45% NACL  0-30 Units/kg/hr (Adjusted) Continuous Isaiah Beach M.D. 15.2 mL/hr at 11/13/21 0803 12 Units/kg/hr at 11/13/21 0803   • heparin injection 1,900 Units  30 Units/kg (Adjusted) PRN Isaiah Beach M.D.       • fentaNYL (SUBLIMAZE) injection 50 mcg  50 mcg Q15 MIN PRN Jeremy M Gonda, M.D.        And   • fentaNYL (SUBLIMAZE) injection 100 mcg  100 mcg Q15 MIN PRN Jeremy M Gonda, M.D.   100 mcg at 11/12/21 1916    And   • fentaNYL (SUBLIMAZE) 50 mcg/mL in 50mL (Continuous Infusion)   Continuous Jeremy M Gonda, M.D.   Stopped at 11/13/21 1152    And   • dexmedetomidine (PRECEDEX) 400 mcg/100mL NS premix infusion  0.1-1.5 mcg/kg/hr Continuous Jeremy M Gonda, M.D.   Stopped at 11/13/21 1153   • norepinephrine (Levophed) 8 mg in 250 mL NS infusion (premix)  0-30 mcg/min Continuous Amrit Bowles M.D. 26.3 mL/hr at 11/13/21 1337 14 mcg/min at 11/13/21 1337   • senna-docusate (PERICOLACE or SENOKOT S) 8.6-50 MG per tablet 2 Tablet  2 Tablet BID Amrit Bowles M.D.        And   • polyethylene glycol/lytes (MIRALAX) PACKET 1 Packet  1 Packet QDAY PRN Amrit Bowles M.D.        And   • magnesium hydroxide (MILK OF MAGNESIA) suspension 30 mL  30 mL QDAY PRN Amrit Bowles M.D.        And   • bisacodyl (DULCOLAX) suppository 10 mg  10 mg QDAY PRN Amrit Bowles M.D.       • MD Alert...ICU Electrolyte Replacement per Pharmacy   PHARMACY TO DOSE Amrit Bowles M.D.       • lidocaine (XYLOCAINE) 1 % injection 2 mL  2 mL Q30 MIN PRN Amrit Bowles M.D.       • Respiratory Therapy Consult   Continuous RT Amrit Bowles M.D.       • ipratropium-albuterol (DUONEB) nebulizer solution  3 mL Q2HRS PRN (RT) Amrit Bowles M.D.   3 mL at 11/12/21 8896   • cefTRIAXone (Rocephin) 1 g in  mL IVPB  1 g Q24HRS Amrit Bowles M.D.   " Stopped at 11/13/21 0543   • doxycycline (VIBRAMYCIN) 100 mg in  mL IVPB  100 mg Q12HRS Amrit Bowles M.D.   Stopped at 11/13/21 0653   • EPINEPHrine (Adrenalin) infusion 4 mg/250 mL (premix)  0-10 mcg/min Continuous Amrit Bowles M.D.   Stopped at 11/13/21 0613   • vasopressin (VASOSTRICT) 20 Units in  mL Infusion  0.03 Units/min Continuous Amrit Bowles M.D. 9 mL/hr at 11/13/21 0926 0.03 Units/min at 11/13/21 0926   Last reviewed on 11/12/2021  7:24 PM by Bruno De Luna    No Known Allergies    Physical Exam  Body mass index is 32.26 kg/m². /71   Pulse 66   Temp 37.5 °C (99.5 °F) (Bladder)   Resp (!) 26   Ht 1.6 m (5' 3\")   Wt 82.6 kg (182 lb 1.6 oz)   SpO2 95%    Vitals:    11/13/21 1145 11/13/21 1200 11/13/21 1215 11/13/21 1300   BP:   109/65 125/71   Pulse: 67 73 68 66   Resp: (!) 26 (!) 26 (!) 26 (!) 26   Temp:       TempSrc:  Bladder     SpO2: 95% 95% 94% 95%   Weight:       Height:        Oxygen Therapy:  Pulse Oximetry: 95 %, O2 (LPM): 15, FiO2%: 30 %, O2 Delivery Device: Ventilator    General: Sedated on the vent  Eyes: nl conjunctiva  ENT: ET tube no bleeding no mouth bleeding after lytics  Neck: Central venous catheter clean dry intact  Lungs: Vent sounds  Heart: Regular rate  EXT no edema bilateral lower extremities. + pedal pulses. no cyanosis  Abdomen: soft, non tender, non distended,  Neurological: No focal deficits  Psychiatric: Appropriate affect,   Skin: Warm extremities    Labs (personally reviewed and notable for):   Recent Results (from the past 24 hour(s))   aPTT    Collection Time: 11/12/21  4:49 PM   Result Value Ref Range    APTT 102.3 (HH) 24.7 - 36.0 sec   Prothrombin Time    Collection Time: 11/12/21  4:49 PM   Result Value Ref Range    PT 14.7 (H) 12.0 - 14.6 sec    INR 1.19 (H) 0.87 - 1.13   Heparin Xa (Unfractionated)    Collection Time: 11/12/21  4:49 PM   Result Value Ref Range    Heparin Xa (UFH) 0.61 IU/mL   EKG    Collection Time: " 21  4:49 PM   Result Value Ref Range    Report       [facility]    Test Date:  2021  Pt Name:    QUINN RAMIREZ                  Department: 161  MRN:        [id number]                      Room:       T627  Gender:     Female                       Technician: NYLA  :        [id number]                   Requested By:ANGIE GUERIN  Order #:    [id number]                    Reading MD:    Measurements  Intervals                                Axis  Rate:       108                          P:          69  MN:         167                          QRS:        56  QRSD:       94                           T:          64  QT:         350  QTc:        471    Interpretive Statements  Sinus tachycardia  Anterolateral infarct, acute  No previous ECG available for comparison     EC-ECHOCARDIOGRAM LTD W/O CONT    Collection Time: 21  5:30 PM   Result Value Ref Range    Eject.Frac. MOD BP 57.15     Eject.Frac. MOD 4C 68.32     Eject.Frac. MOD 2C 42.51     Left Ventrical Ejection Fraction 75    POCT venous blood gas device results    Collection Time: 21  6:31 PM   Result Value Ref Range    Ph 7.139 (L) 7.310 - 7.450    Pco2 80.2 (H) 41.0 - 51.0 mmHg    Po2 94 (H) 25 - 40 mmHg    Tco2 30 20 - 33 mmol/L    SO2 94 %    Hco3 27.2 24.0 - 28.0 mmol/L    BE -4 -4 - 3 mmol/L    Body Temp 36.7 C degrees    Ph Temp Correc 7.143 (L) 7.310 - 7.450    Pco2 Temp Samuel 79.2 (H) 41.0 - 51.0 mmHg    Po2 Temp Corre 92 (H) 25 - 40 mmHg    Specimen Venous    POCT hematocrit and hemoglobin device results    Collection Time: 21  6:31 PM   Result Value Ref Range    Istat Hematocrit 44 37 - 47 %    Istat Hemoglobin 15.0 12.0 - 16.0 g/dL   CULTURE RESPIRATORY W/ GRM STN    Collection Time: 21  8:45 PM    Specimen: Tracheal Aspirate; Respirate   Result Value Ref Range    Significant Indicator NEG     Source RESP     Site TRACHEAL ASPIRATE     Culture Result -     Gram Stain Result        Many WBCs.  Few epithelial cells.  No organisms seen.     GRAM STAIN    Collection Time: 11/12/21  8:45 PM    Specimen: Respirate   Result Value Ref Range    Significant Indicator .     Source RESP     Site TRACHEAL ASPIRATE     Gram Stain Result       Many WBCs.  Few epithelial cells.  No organisms seen.     EC-ECHOCARDIOGRAM LTD W/O CONT    Collection Time: 11/12/21  9:30 PM   Result Value Ref Range    Left Ventrical Ejection Fraction 75    Heparin Anti-Xa    Collection Time: 11/12/21  9:53 PM   Result Value Ref Range    Heparin Xa (UFH) 0.33 IU/mL   Basic Metabolic Panel    Collection Time: 11/12/21  9:53 PM   Result Value Ref Range    Sodium 131 (L) 135 - 145 mmol/L    Potassium 4.9 3.6 - 5.5 mmol/L    Chloride 96 96 - 112 mmol/L    Co2 21 20 - 33 mmol/L    Glucose 176 (H) 65 - 99 mg/dL    Bun 42 (H) 8 - 22 mg/dL    Creatinine 1.00 0.50 - 1.40 mg/dL    Calcium 8.4 (L) 8.5 - 10.5 mg/dL    Anion Gap 14.0 7.0 - 16.0   MAGNESIUM    Collection Time: 11/12/21  9:53 PM   Result Value Ref Range    Magnesium 2.2 1.5 - 2.5 mg/dL   CBC WITH DIFFERENTIAL    Collection Time: 11/12/21  9:53 PM   Result Value Ref Range    WBC 66.5 (HH) 4.8 - 10.8 K/uL    RBC 4.06 (L) 4.20 - 5.40 M/uL    Hemoglobin 12.3 12.0 - 16.0 g/dL    Hematocrit 38.5 37.0 - 47.0 %    MCV 94.8 81.4 - 97.8 fL    MCH 30.3 27.0 - 33.0 pg    MCHC 31.9 (L) 33.6 - 35.0 g/dL    RDW 49.7 35.9 - 50.0 fL    Platelet Count 617 (H) 164 - 446 K/uL    MPV 9.7 9.0 - 12.9 fL    Neutrophils-Polys 79.50 (H) 44.00 - 72.00 %    Lymphocytes 4.10 (L) 22.00 - 41.00 %    Monocytes 3.30 0.00 - 13.40 %    Eosinophils 0.00 0.00 - 6.90 %    Basophils 0.00 0.00 - 1.80 %    Nucleated RBC 0.10 /100 WBC    Neutrophils (Absolute) 53.93 (H) 2.00 - 7.15 K/uL    Lymphs (Absolute) 2.73 1.00 - 4.80 K/uL    Monos (Absolute) 2.19 (H) 0.00 - 0.85 K/uL    Eos (Absolute) 0.00 0.00 - 0.51 K/uL    Baso (Absolute) 0.00 0.00 - 0.12 K/uL    NRBC (Absolute) 0.05 K/uL    Anisocytosis 1+     Macrocytosis 1+     LACTIC ACID    Collection Time: 11/12/21  9:53 PM   Result Value Ref Range    Lactic Acid 1.1 0.5 - 2.0 mmol/L   BLOOD CULTURE    Collection Time: 11/12/21  9:53 PM    Specimen: Peripheral; Blood   Result Value Ref Range    Significant Indicator NEG     Source BLD     Site PERIPHERAL     Culture Result       A significant status has been triggered by the BACTEC  instrument due to an increase in CO2 production.  Gram  stain of blood culture bottle shows NO ORGANISMS SEEN.  Further investigation is in progress.  Note: Blood cultures are incubated for 5 days and  are monitored continuously. Positive blood cultures  are called to the RN and reported as soon as  they are identified.     PROCALCITONIN    Collection Time: 11/12/21  9:53 PM   Result Value Ref Range    Procalcitonin 4.39 (H) <0.25 ng/mL   proBrain Natriuretic Peptide, NT    Collection Time: 11/12/21  9:53 PM   Result Value Ref Range    NT-proBNP 60259 (H) 0 - 125 pg/mL   TROPONIN    Collection Time: 11/12/21  9:53 PM   Result Value Ref Range    Troponin T 455 (H) 6 - 19 ng/L   ESTIMATED GFR    Collection Time: 11/12/21  9:53 PM   Result Value Ref Range    GFR If African American >60 >60 mL/min/1.73 m 2    GFR If Non  56 (A) >60 mL/min/1.73 m 2   PLATELET ESTIMATE    Collection Time: 11/12/21  9:53 PM   Result Value Ref Range    Plt Estimation Increased    MORPHOLOGY    Collection Time: 11/12/21  9:53 PM   Result Value Ref Range    RBC Morphology Present     Large Platelets 1+    PERIPHERAL SMEAR REVIEW    Collection Time: 11/12/21  9:53 PM   Result Value Ref Range    Peripheral Smear Review see below    DIFFERENTIAL MANUAL    Collection Time: 11/12/21  9:53 PM   Result Value Ref Range    Bands-Stabs 1.60 0.00 - 10.00 %    Metamyelocytes 4.90 %    Myelocytes 6.60 %    Manual Diff Status PERFORMED    EKG    Collection Time: 11/12/21 10:53 PM   Result Value Ref Range    Report       [facility]    Test Date:  2021-11-12  Pt Name:     QUINN RAMIREZ                  Department: 161  MRN:        [id number]                      Room:       [facility]  Gender:     Female                       Technician: EDITH  :        1955                   Requested By:ANGIE GUERIN  Order #:    [id number]                    Reading MD: Isaiah Beach MD    Measurements  Intervals                                Axis  Rate:       94                           P:          76  MS:         140                          QRS:        40  QRSD:       84                           T:          61  QT:         416  QTc:        521    Interpretive Statements  SINUS RHYTHM  ST ELEVATION, PROBABLE LATERAL INJURY  ST ELEVATION, CONSIDER ANTERIOR INJURY  PROLONGED QT INTERVAL  Compared to ECG 2021 16:49:20  ST (T wave) deviation less present  Prolonged QT interval now present  Sinus tachycardia no longer present  Myocardial infarct finding still present  Electronically Signed On  3:22:30 PST by Isaiah Beach MD     POCT arterial blood gas device results    Collection Time: 21 11:04 PM   Result Value Ref Range    Ph 7.224 (LL) 7.400 - 7.500    Pco2 49.4 (H) 26.0 - 37.0 mmHg    Po2 75 64 - 87 mmHg    Tco2 22 20 - 33 mmol/L    S02 92 (L) 93 - 99 %    Hco3 20.4 17.0 - 25.0 mmol/L    BE -7 (L) -4 - 3 mmol/L    Body Temp 37.1 C degrees    O2 Therapy 50 %    iPF Ratio 150     Ph Temp Samuel 7.222 (LL) 7.400 - 7.500    Pco2 Temp Co 49.6 (H) 26.0 - 37.0 mmHg    Po2 Temp Cor 75 64 - 87 mmHg    Specimen Arterial     DelSys Vent     End Tidal Carbon Dioxide 38 mmhg    Tidal Volume 340 mL    Peep End Expiratory Pressure 8 cmh20    Set Rate 22     Mode APV-CMV    POCT sodium device results    Collection Time: 21 11:04 PM   Result Value Ref Range    Istat Sodium 132 (L) 135 - 145 mmol/L   POCT hematocrit and hemoglobin device results    Collection Time: 21 11:04 PM   Result Value Ref Range    Istat Hematocrit 38 37 - 47 %    Istat Hemoglobin 12.9 12.0 -  16.0 g/dL   BLOOD CULTURE    Collection Time: 11/13/21 12:44 AM    Specimen: Peripheral; Blood   Result Value Ref Range    Significant Indicator NEG     Source BLD     Site PERIPHERAL     Culture Result       No Growth  Note: Blood cultures are incubated for 5 days and  are monitored continuously.Positive blood cultures  are called to the RN and reported as soon as  they are identified.     POCT arterial blood gas device results    Collection Time: 11/13/21  2:46 AM   Result Value Ref Range    Ph 7.349 (L) 7.400 - 7.500    Pco2 42.3 (H) 26.0 - 37.0 mmHg    Po2 56 (L) 64 - 87 mmHg    Tco2 25 20 - 33 mmol/L    S02 87 (L) 93 - 99 %    Hco3 23.3 17.0 - 25.0 mmol/L    BE -2 -4 - 3 mmol/L    Body Temp 38.2 C degrees    O2 Therapy 30 %    iPF Ratio 187     Ph Temp Samuel 7.331 (L) 7.400 - 7.500    Pco2 Temp Co 44.6 (H) 26.0 - 37.0 mmHg    Po2 Temp Cor 61 (L) 64 - 87 mmHg    Specimen Arterial     DelSys Vent     End Tidal Carbon Dioxide 32 mmhg    Tidal Volume 380 mL    Peep End Expiratory Pressure 8 cmh20    Set Rate 24     Mode APV-CMV    CBC with Differential    Collection Time: 11/13/21  4:00 AM   Result Value Ref Range    WBC 58.8 (HH) 4.8 - 10.8 K/uL    RBC 3.81 (L) 4.20 - 5.40 M/uL    Hemoglobin 11.9 (L) 12.0 - 16.0 g/dL    Hematocrit 35.7 (L) 37.0 - 47.0 %    MCV 93.7 81.4 - 97.8 fL    MCH 31.2 27.0 - 33.0 pg    MCHC 33.3 (L) 33.6 - 35.0 g/dL    RDW 49.1 35.9 - 50.0 fL    Platelet Count 640 (H) 164 - 446 K/uL    MPV 10.1 9.0 - 12.9 fL    Neutrophils-Polys 75.90 (H) 44.00 - 72.00 %    Lymphocytes 0.90 (L) 22.00 - 41.00 %    Monocytes 0.90 0.00 - 13.40 %    Eosinophils 0.00 0.00 - 6.90 %    Basophils 0.80 0.00 - 1.80 %    Nucleated RBC 0.10 /100 WBC    Neutrophils (Absolute) 46.63 (H) 2.00 - 7.15 K/uL    Lymphs (Absolute) 0.53 (L) 1.00 - 4.80 K/uL    Monos (Absolute) 0.53 0.00 - 0.85 K/uL    Eos (Absolute) 0.00 0.00 - 0.51 K/uL    Baso (Absolute) 0.47 (H) 0.00 - 0.12 K/uL    NRBC (Absolute) 0.08 K/uL    Anisocytosis 1+      Macrocytosis 1+     Microcytosis 1+    Basic Metabolic Panel (BMP)    Collection Time: 21  4:00 AM   Result Value Ref Range    Sodium 132 (L) 135 - 145 mmol/L    Potassium 4.4 3.6 - 5.5 mmol/L    Chloride 94 (L) 96 - 112 mmol/L    Co2 22 20 - 33 mmol/L    Glucose 207 (H) 65 - 99 mg/dL    Bun 43 (H) 8 - 22 mg/dL    Creatinine 1.19 0.50 - 1.40 mg/dL    Calcium 8.3 (L) 8.5 - 10.5 mg/dL    Anion Gap 16.0 7.0 - 16.0   Magnesium    Collection Time: 21  4:00 AM   Result Value Ref Range    Magnesium 2.3 1.5 - 2.5 mg/dL   Phosphorus    Collection Time: 21  4:00 AM   Result Value Ref Range    Phosphorus 5.8 (H) 2.5 - 4.5 mg/dL   proBrain Natriuretic Peptide, NT    Collection Time: 21  4:00 AM   Result Value Ref Range    NT-proBNP 64901 (H) 0 - 125 pg/mL   ESTIMATED GFR    Collection Time: 21  4:00 AM   Result Value Ref Range    GFR If  55 (A) >60 mL/min/1.73 m 2    GFR If Non  46 (A) >60 mL/min/1.73 m 2   PLATELET ESTIMATE    Collection Time: 21  4:00 AM   Result Value Ref Range    Plt Estimation Increased    MORPHOLOGY    Collection Time: 21  4:00 AM   Result Value Ref Range    RBC Morphology Present     Polychromia 1+     Toxic Gran Slight     Dohle Bodies Few    PERIPHERAL SMEAR REVIEW    Collection Time: 21  4:00 AM   Result Value Ref Range    Peripheral Smear Review see below    DIFFERENTIAL MANUAL    Collection Time: 21  4:00 AM   Result Value Ref Range    Bands-Stabs 3.40 0.00 - 10.00 %    Metamyelocytes 2.60 %    Myelocytes 12.10 %    Progranulocytes 3.40 %    Manual Diff Status PERFORMED    EKG    Collection Time: 21  8:41 AM   Result Value Ref Range    Report       [facility]    Test Date:  2021  Pt Name:    QUINN RAMIREZ                  Department: 161  MRN:        [id number]                      Room:       [facility]  Gender:     Female                       Technician: LIBORIO  :        [id number]                    Requested By:YURY VIVAR  Order #:    [id number]                    Reading MD: Yury Vivar MD    Measurements  Intervals                                Axis  Rate:       68                           P:  NH:                                      QRS:        23  QRSD:       78                           T:          59  QT:         488  QTc:        520    Interpretive Statements  SINUS RHYTHM  LOW VOLTAGE THROUGHOUT  BORDERLINE T ABNORMALITIES, ANT-LAT LEADS  BORDERLINE ST ELEVATION, INFERIOR LEADS  PROLONGED QT INTERVAL  Compared to ECG 11/12/2021 22:53:13  ST SEGMENTS NEARLY RESOLVED  Electronically Signed On 11- 13:25:23 PST by Yury Vivar MD     Comp Metabolic Panel    Collection Time: 11/13/21 10:50 AM   Result Value Ref Range    Sodium 135 135 - 145 mmol/L    Potassium 4.6 3.6 - 5.5 mmol/L    Chloride 100 96 - 112 mmol/L    Co2 21 20 - 33 mmol/L    Anion Gap 14.0 7.0 - 16.0    Glucose 148 (H) 65 - 99 mg/dL    Bun 51 (H) 8 - 22 mg/dL    Creatinine 1.29 0.50 - 1.40 mg/dL    Calcium 7.9 (L) 8.5 - 10.5 mg/dL    AST(SGOT) 16 12 - 45 U/L    ALT(SGPT) 21 2 - 50 U/L    Alkaline Phosphatase 185 (H) 30 - 99 U/L    Total Bilirubin 0.3 0.1 - 1.5 mg/dL    Albumin 2.4 (L) 3.2 - 4.9 g/dL    Total Protein 5.8 (L) 6.0 - 8.2 g/dL    Globulin 3.4 1.9 - 3.5 g/dL    A-G Ratio 0.7 g/dL   ESTIMATED GFR    Collection Time: 11/13/21 10:50 AM   Result Value Ref Range    GFR If African American 50 (A) >60 mL/min/1.73 m 2    GFR If Non  42 (A) >60 mL/min/1.73 m 2   POCT glucose device results    Collection Time: 11/13/21 11:51 AM   Result Value Ref Range    Glucose - Accu-Ck 135 (H) 65 - 99 mg/dL       Cardiac Imaging and Procedures Review:    EKG and telemetry tracings personally reviewed    Impression and Medical Decision Making:  Principal Problem:    ST elevation myocardial infarction involving left anterior descending (LAD) coronary artery (HCC) POA: Yes  Active Problems:    Primary  hypertension (Chronic) POA: Yes    Dyslipidemia (Chronic) POA: Unknown    Shock (HCC) POA: Unknown    COPD (chronic obstructive pulmonary disease) (HCC) POA: Unknown    Acute respiratory failure with hypoxia and hypercapnia (HCC) POA: Unknown    Altered level of consciousness POA: Unknown    Pneumonia due to infectious organism POA: Unknown    Leukocytosis POA: Unknown    Prolonged Q-T interval on ECG POA: Unknown  Resolved Problems:    * No resolved hospital problems. *    STEMI status post lytics is unclear if her ST elevations was due to obstruction of the LAD or possibly stress cardiomyopathy  Shock still out of proportion to the features of her EKG and echocardiogram hopefully wean  Hopeful to wean from respiratory support  Continue anticoagulation aspirin  Close monitoring of neuro status  We would consider coronary angiogram after she has been extubated and stabilized      I personally discussed her case with  Dr Jeremy M Gonda, M.D.    No future appointments.    It is my pleasure to participate in the care of Ms. Prieto.  Please do not hesitate to contact me with questions or concerns.    Isaiah Beach MD PhD FAC  Cardiologist [facility]    11/13/2021    Please note that this dictation was created using voice recognition software. There may be errors of grammar and possibly content I did not discover before finalizing the note.

## 2021-11-13 NOTE — ASSESSMENT & PLAN NOTE
Likely multifactorial - improving  Limit sedatives with close neurologic monitoring  Reorient and maintain sleep/wake cycle

## 2021-11-13 NOTE — ASSESSMENT & PLAN NOTE
Undifferentiated, EF 75% -likely septic shock predominantly --> improved  Wean stress dose steroids given home steroid use for relative adrenal insufficiency  Continue empiric antibiotics  Euvolemic, hold additional fluids

## 2021-11-13 NOTE — ED NOTES
Transport postponed per Dr. Bowles. SBP 90's despite maxed on levophed at 30mcg/min. Received verbal order for 500 ml NS bolus.    Updated Dr. Bowles regarding patient's HR dropping from 110's to 60 to 70's. Cardiology to come to bedside.

## 2021-11-13 NOTE — PROGRESS NOTES
"Critical Care Progress Note    Date of admission  11/12/2021    Chief Complaint  66 y.o. female admitted 11/12/2021 with STEMI, AMS, respiratory failure    Hospital Course  \"66 y.o. female history of COPD, diabetes and hypertension who presented 11/12/2021 with chest pain to [facility], ER.  EKG was consistent with STEMI and patient was given TNKase, heparin, nitro, aspirin and then sent to our facility for possible coronary artery angiogram.  Daughter gave a history of cough congestion and bronchitis-like symptoms for a week or so prior to admission.  She was given ceftriaxone prior to transfer for possible right lower lobe pneumonia.  On arrival here she is altered and had left-sided plegia and code stroke was called.  She had significant carotid stenosis but no intervention was suggested by exam or other images after neurology review.  However the stenosis was bad enough where neurology requested a blood pressure 120-140 range if possible since she neurologically was better at that higher pressure presumably due to better flow across the right carotid stenotic region.  I arrived to see her in the ER and she had some labored breathing and was altered for me and blood gas was being obtained that time and her PCO2 was in the 80s.  She was intubated and a central line was placed emergently.  She dropped her blood pressure after intubation.  500 cc fluid bolus was given and repeated x1 after reviewing the initial echocardiogram with cardiology who felt she was on the dry side by imaging.  Norepinephrine infusion was initiated and and initially blood pressure stabilized in the 120s on norepinephrine at 12.  See was subsequently moved to the [facility] and put tension persisted requiring additional pressors after norepinephrine was maxed at 30.  Follow-up EKG actually looked a little bit better and follow-up echocardiogram was unchanged.  Lactic acid was normal at 1.8.  Cardiology and I reviewed the case at length and our " "presumption is this is a neurogenic hemodynamic process.  The addition of epinephrine raised her heart rate but really did not help her blood pressure.  Transiently when her blood pressure was 140 she actually was spontaneously moving her right side and trying to open her eyes.  Currently adding vasopressin and will try Jem-Synephrine as well in hopes to wean off epinephrine and get her heart rate back down in the face of a STEMI.  Reviewed the option of stenting of coronary or stenting the carotid with cardiology and neurology and both think the risk outweigh the benefits at this time.  Patient is currently post TNK on a heparin drip.\" - Dr. Bowles 11/12      Interval Problem Update  Reviewed last 24 hour events:   - Tm 38.5, significant leukocytosis   - NSR, SBP  on levophed and vasopressin gtt   - minimal secretions   - awakens and follows with L sided weakness   - ASA   - heparin gtt   - R sided effusion on CXR and US   - precedex gtt @ 1   - CVP 6-12   - NPO with coffee grounds in NGT   - Hgb 12   - plts up to 640   - rocephin and doxy with elevated PCT   - start stress dose steroids (on home O2)   - Na up to 132   - mild VAL[place]   - elevated BNP   - CT CVA w/u pending   - EF 75%   - start ISS    Review of Systems  Review of Systems   Unable to perform ROS: Intubated        Vital Signs for last 24 hours   Temp:  [35.9 °C (96.6 °F)-37.5 °C (99.5 °F)] 37.5 °C (99.5 °F)  Pulse:  [] 77  Resp:  [16-45] 26  BP: ()/(39-89) 120/70  SpO2:  [86 %-99 %] 93 %    Hemodynamic parameters for last 24 hours  CVP:  [8 MM HG-300 MM HG] 8 MM HG    Respiratory Information for the last 24 hours  Vent Mode: APVCMV  Rate (breaths/min): 26  Vt Target (mL): 380  PEEP/CPAP: 8  MAP: 13  Control VTE (exp VT): 384    Physical Exam   Physical Exam  Vitals and nursing note reviewed.   Constitutional:       General: She is sleeping.      Appearance: She is overweight. She is ill-appearing. She is not diaphoretic.      " Interventions: She is sedated, intubated and restrained.   HENT:      Head: Normocephalic.      Nose: Nose normal. No congestion.      Comments: Nasal feeding tube in place     Mouth/Throat:      Mouth: Mucous membranes are moist.      Pharynx: Oropharynx is clear.      Comments: Endotracheal tube in place  Eyes:      General: No scleral icterus.     Conjunctiva/sclera: Conjunctivae normal.      Pupils: Pupils are equal, round, and reactive to light.      Comments: Unable to deviate eyes to the left   Neck:      Comments: Right IJ central venous catheter without surrounding erythema  Cardiovascular:      Rate and Rhythm: Normal rate and regular rhythm. Occasional extrasystoles are present.     Chest Wall: PMI is displaced.      Pulses: Decreased pulses.      Heart sounds: No murmur heard.       Comments: Requiring vasopressor support for shock  Pulmonary:      Effort: No tachypnea. She is intubated.      Breath sounds: Examination of the right-middle field reveals decreased breath sounds. Examination of the right-lower field reveals decreased breath sounds. Examination of the left-lower field reveals rhonchi. Decreased breath sounds and rhonchi present. No wheezing.      Comments: Moderate size complicated effusion seen on bedside chest ultrasound on the right  Abdominal:      General: Bowel sounds are normal. There is no distension.      Palpations: Abdomen is soft.      Tenderness: There is no abdominal tenderness. There is no guarding.   Genitourinary:     Comments: Barry catheter in place  Musculoskeletal:         General: No tenderness.      Cervical back: Neck supple. No rigidity.      Right lower leg: No edema.      Left lower leg: No edema.   Skin:     General: Skin is warm and dry.      Capillary Refill: Capillary refill takes 2 to 3 seconds.      Coloration: Skin is not pale.   Neurological:      Mental Status: She is easily aroused.      Comments: Left-sided weakness, follows on the right side, makes eye  contact and nods appropriately   Psychiatric:         Behavior: Behavior is cooperative.      Comments: Unable to assess given current clinical condition         Medications  Current Facility-Administered Medications   Medication Dose Route Frequency Provider Last Rate Last Admin   • heparin infusion 25,000 units in 500 mL 0.45% NACL  0-30 Units/kg/hr (Adjusted) Intravenous Continuous Isaiah Beach M.D. 15.2 mL/hr at 11/12/21 2300 12 Units/kg/hr at 11/12/21 2300   • heparin injection 1,900 Units  30 Units/kg (Adjusted) Intravenous PRN Isaiah Beach M.D.       • fentaNYL (SUBLIMAZE) injection 50 mcg  50 mcg Intravenous Q15 MIN PRN Amrit Bowles M.D.        And   • fentaNYL (SUBLIMAZE) injection 100 mcg  100 mcg Intravenous Q15 MIN PRN Amrit Bowles M.D.   100 mcg at 11/12/21 1916    And   • fentaNYL (SUBLIMAZE) 50 mcg/mL in 50mL (Continuous Infusion)   Intravenous Continuous Amrit Bowles M.D. 4 mL/hr at 11/13/21 0324 200 mcg/hr at 11/13/21 0324    And   • dexmedetomidine (PRECEDEX) 400 mcg/100mL NS premix infusion  0-0.7 mcg/kg/hr Intravenous Continuous Amrit Bowles M.D. 14 mL/hr at 11/13/21 0311 0.7 mcg/kg/hr at 11/13/21 0311   • norepinephrine (Levophed) 8 mg in 250 mL NS infusion (premix)  0-30 mcg/min Intravenous Continuous Amrit Bowles M.D. 41.3 mL/hr at 11/13/21 0628 22 mcg/min at 11/13/21 0628   • topiramate (TOPAMAX) tablet 75 mg  75 mg Oral Q EVENING Amrit Bowles M.D.       • famotidine (PEPCID) tablet 20 mg  20 mg Enteral Tube Q12HRS Amrit Bowles M.D.        Or   • famotidine (PEPCID) injection 20 mg  20 mg Intravenous Q12HRS Amrit Bowles M.D.   20 mg at 11/13/21 0511   • senna-docusate (PERICOLACE or SENOKOT S) 8.6-50 MG per tablet 2 Tablet  2 Tablet Enteral Tube BID Amrit Bowles M.D.        And   • polyethylene glycol/lytes (MIRALAX) PACKET 1 Packet  1 Packet Enteral Tube QDAY PRN Amrit Bowles M.D.        And   • magnesium hydroxide  (MILK OF MAGNESIA) suspension 30 mL  30 mL Enteral Tube QDAY PRN Amrit Bowles M.D.        And   • bisacodyl (DULCOLAX) suppository 10 mg  10 mg Rectal QDAY PRN Amrit Bowles M.D.       • MD Alert...ICU Electrolyte Replacement per Pharmacy   Other PHARMACY TO DOSE Amrit Bowles M.D.       • lidocaine (XYLOCAINE) 1 % injection 2 mL  2 mL Tracheal Tube Q30 MIN PRN Amrit Bowles M.D.       • Respiratory Therapy Consult   Nebulization Continuous RT Amrit Bowles M.D.       • ipratropium-albuterol (DUONEB) nebulizer solution  3 mL Nebulization Q2HRS PRN (RT) Amrit Bowles M.D.   3 mL at 11/12/21 2313   • cefTRIAXone (Rocephin) 1 g in  mL IVPB  1 g Intravenous Q24HRS Amrit Bowles M.D.   Stopped at 11/13/21 0543   • doxycycline (VIBRAMYCIN) 100 mg in  mL IVPB  100 mg Intravenous Q12HRS Amrit Bowles M.D. 100 mL/hr at 11/13/21 0553 100 mg at 11/13/21 0553   • EPINEPHrine (Adrenalin) infusion 4 mg/250 mL (premix)  0-10 mcg/min Intravenous Continuous Amrit Bowles M.D.   Stopped at 11/13/21 0613   • vasopressin (VASOSTRICT) 20 Units in  mL Infusion  0.03 Units/min Intravenous Continuous Amrit Bowles M.D. 9 mL/hr at 11/12/21 2344 0.03 Units/min at 11/12/21 2344       Fluids    Intake/Output Summary (Last 24 hours) at 11/13/2021 0706  Last data filed at 11/13/2021 0600  Gross per 24 hour   Intake 1657.87 ml   Output 883 ml   Net 774.87 ml       Laboratory  Recent Labs     11/12/21  1831 11/12/21  2304 11/13/21  0246   ISTATAPH  --  7.224* 7.349*   ISTATAPCO2  --  49.4* 42.3*   ISTATAPO2  --  75 56*   ISTATATCO2  --  22 25   GCPMVCR2UQH  --  92* 87*   ISTATARTHCO3  --  20.4 23.3   ISTATARTBE  --  -7* -2   ISTATTEMP 36.7 C 37.1 C 38.2 C   ISTATFIO2  --  50 30   ISTATSPEC Venous Arterial Arterial   ISTATAPHTC  --  7.222* 7.331*   NAVJOGHA6BU  --  75 61*         Recent Labs     11/12/21  2153 11/13/21  0400   SODIUM 131* 132*   POTASSIUM 4.9 4.4   CHLORIDE 96 94*    CO2 21 22   BUN 42* 43*   CREATININE 1.00 1.19   MAGNESIUM 2.2 2.3   PHOSPHORUS  --  5.8*   CALCIUM 8.4* 8.3*     Recent Labs     11/12/21 2153 11/13/21  0400   GLUCOSE 176* 207*     Recent Labs     11/12/21 2153 11/13/21  0400   WBC 66.5* 58.8*   NEUTSPOLYS 79.50* 75.90*   LYMPHOCYTES 4.10* 0.90*   MONOCYTES 3.30 0.90   EOSINOPHILS 0.00 0.00   BASOPHILS 0.00 0.80     Recent Labs     11/12/21  1649 11/12/21 2153 11/13/21  0400   RBC  --  4.06* 3.81*   HEMOGLOBIN  --  12.3 11.9*   HEMATOCRIT  --  38.5 35.7*   PLATELETCT  --  617* 640*   PROTHROMBTM 14.7*  --   --    APTT 102.3*  --   --    INR 1.19*  --   --        Imaging  X-Ray:  I have personally reviewed the images and compared with prior images. and My impression is: Unchanged moderate to large right-sided effusion, edema pattern, enlarged cardiac silhouette, endotracheal tube/gastric tube/right IJ central venous catheter in good position  CT:    Reviewed  Echo:   Reviewed    Assessment/Plan  * ST elevation myocardial infarction involving left anterior descending (LAD) coronary artery (HCC)- (present on admission)  Assessment & Plan  Status post ASA/TNK at [place] 11/12  Cardiology consulted, coronary angiography/PCI on hold given resolution of STEMI  Continue heparin drip, antiplatelets  Eventual beta-blocker once shock resolved    Acute left-sided weakness  Assessment & Plan  Felt to be secondary to carotid stenosis/ischemia  Neurology consulted  Follow-up on CT scans  Antiplatelets, statin  Blood pressure goal SBP greater than 140  PT/OT/SLP eval  MRI brain pending    Pneumonia due to infectious organism  Assessment & Plan  Continue empiric Rocephin and doxycycline  Trend procalcitonin  Follow-up on outside hospital as well as [facility] cultures  Pleural fluid for culture and Gram stain    Acute respiratory failure with hypoxia and hypercapnia (HCC)  Assessment & Plan  Intubated 11/12  Continue full mechanical ventilatory support, no change to minute  ventilation  Titrate FiO2 to goal SPO2 greater than 88%  RT/O2 protocol  Plans for therapeutic/diagnostic right thoracentesis in 24 hours  Eventual diuresis  Limit sedatives with close neurologic monitoring  ABCDEF bundle    Shock ([facility])  Assessment & Plan  Undifferentiated, EF 75%  Continue to titrate norepinephrine and vasopressin drips to maintain mean arterial pressure greater than 65  Monitor CVP with goal 10-12  Begin stress dose steroids given home steroid use for relative adrenal insufficiency  Continue empiric antibiotics    Type 2 diabetes mellitus with hyperglycemia, without long-term current use of insulin ([facility])  Assessment & Plan  Begin insulin sliding scale with goal glucose between 120 and 180  Begin enteral nutrition and monitor    Acute renal failure with tubular necrosis ([facility])  Assessment & Plan  Avoid nephrotoxins  Monitor creatinine, urine output, electrolytes closely    Altered level of consciousness  Assessment & Plan  Likely multifactorial - improving  Limit sedatives with close neurologic monitoring    COPD (chronic obstructive pulmonary disease) ([facility])  Assessment & Plan  Without acute exacerbation  RT/O2 protocol  As needed bronchodilators  Continue empiric Rocephin and doxycycline, follow-up on cultures    Dyslipidemia  Assessment & Plan  Continue high intensity statin    Primary hypertension- (present on admission)  Assessment & Plan  Remains in shock  Hold scheduled antihypertensives and monitor    Hyponatremia  Assessment & Plan  Monitor with IV fluids    Thrombocytosis  Assessment & Plan  Likely reactive, monitor with treatment  Possible bone marrow biopsy if persists    Prolonged Q-T interval on ECG  Assessment & Plan  Avoid QT prolonging medications  Optimize electrolytes, continuous telemetry monitoring    Leukocytosis  Assessment & Plan  Marked elevated WBC, question leukemoid reaction, ?  Malignancy  Monitor with antibiotics  May need bone marrow biopsy if persists       VTE:   Heparin  Ulcer: H2 Antagonist  Lines: Central Line  Ongoing indication addressed, Arterial Line  Ongoing indication addressed and Barry Catheter  Ongoing indication addressed    I have performed a physical exam and reviewed and updated ROS and Plan today (11/13/2021). In review of yesterday's note (11/12/2021), there are no changes except as documented above.     Patient is critically ill today requiring active management of her mechanical ventilatory support as well as titration of sedative and vasopressor drips. High risk of deterioration and worsening vital organ dysfunction and death without the above critical care interventions.    Discussed patient condition and risk of morbidity and/or mortality with Family, RN, RT, Pharmacy, Charge nurse / hot rounds, Patient and cardiology.  Critical care time = 89 minutes in directly providing and coordinating critical care and extensive data review.  No time overlap and excludes procedures.

## 2021-11-13 NOTE — ED TRIAGE NOTES
Chief Complaint   Patient presents with   • Chest Pain     Patient transfered from [facility] for a STEMI. Patient was given TNK, Heparin, Nitro, ASA, PTA. Patient arrived on heparin drip. Patient also received Rocephin for PNA diagnosis     Patient arrived to [facility] as a STEMI activation and was met by the STEMI team. The patient became altered and aphasic while in [facility] so the patient was taken to CT for a head CT without to rule out a brain bleed. The patient continued to become confused so a code stroke was activated by Dr. Beach with cardiology. The patient was taken to the other CT for a CTA with perfusion. The stroke team arrived and a stroke was not diagnosed by Neurology, Dr. Dennison. The patient showed severe right carotid stenosis accompanied by a blood pressure 80's/50's.    The patient was transported to [facility] where the patient was seen by ERP, Neurology, and Intensivist.

## 2021-11-13 NOTE — ASSESSMENT & PLAN NOTE
Marked elevated WBC, question leukemoid reaction, ?  Malignancy -improving  Continue to monitor with antibiotics

## 2021-11-13 NOTE — ASSESSMENT & PLAN NOTE
Continue Rocephin and doxycycline  Follow-up on outside hospital as well as [facility] cultures  Parapneumonic effusion s/p thoracentesis 11/14, follow-up on cytology/culture

## 2021-11-13 NOTE — PROGRESS NOTES
4 Eyes Skin Assessment Completed by Claudia, RN and Will, PATRIZIA.    Head WDL  Ears WDL  Nose WDL  Mouth WDL  Neck WDL  Breast/Chest WDL  Shoulder Blades Redness and Blanching  Spine WDL  (R) Arm/Elbow/Hand WDL  (L) Arm/Elbow/Hand WDL  Abdomen WDL  Groin WDL  Scrotum/Coccyx/Buttocks WDL  (R) Leg WDL  (L) Leg WDL  (R) Heel/Foot/Toe WDL  (L) Heel/Foot/Toe WDL          Devices In Places ECG, Pulse Ox, Barry, Arterial Line, ET Tube and Central Line      Interventions In Place NC Cheek Stickers, Pillows, Q2 Turns and Low Air Loss Mattress    Possible Skin Injury No    Pictures Uploaded Into Epic N/A  Wound Consult Placed N/A  RN Wound Prevention Protocol Ordered No

## 2021-11-13 NOTE — ED NOTES
Report given to RN, patient transferring to T627 on zoll monitor with ventilator with RT via gurney. All belongings sent up with patient.

## 2021-11-13 NOTE — ASSESSMENT & PLAN NOTE
Continue insulin sliding scale with goal glucose between 120 and 180  Resume diabetic diet once passes SLP

## 2021-11-13 NOTE — ED PROVIDER NOTES
ED Provider Note    CHIEF COMPLAINT  Chief Complaint   Patient presents with   • Chest Pain     Patient transfered from [place] for a STEMI. Patient was given TNK, Heparin, Nitro, ASA, PTA. Patient arrived on heparin drip. Patient also received Rocephin for PNA diagnosis       HPI  Stella Prieto is a 66 y.o. female with a history of COPD who presents by EMS on transfer from [facility] with complaints of chest pain.  Patient apparently has been having chest pain for the past 3 days and was taken to the outside hospital.  EKG showed findings consistent with an acute STEMI with ST elevations in the inferior and lateral leads.  Patient received TNK, was placed on heparin infusion, and was transferred to this facility for further care.  On arrival, patient denies any further chest pain.  Additional history was obtained from the patient's daughter who arrived later.  The patient apparently has been having a cough, congestion and chest pressure for the past 8 days, but the pain became much worse about 2 days ago.  Initially daughter want to bring her to the hospital but she refused until today.  The patient has a history of alcohol and tobacco use, but quit both about 4 years ago.  She denies any cardiac history or history of high blood pressure or diabetes.    REVIEW OF SYSTEMS  See HPI for further details. All other systems are negative.     PAST MEDICAL HISTORY  Past Medical History:   Diagnosis Date   • COPD (chronic obstructive pulmonary disease) ([facility])    • Diabetes ([facility])    • Hypertension    • ST elevation myocardial infarction involving left anterior descending (LAD) coronary artery ([facility]) 2021   • ST elevation myocardial infarction involving left anterior descending (LAD) coronary artery ([facility]) 2021       FAMILY HISTORY  Family History   Problem Relation Age of Onset   • Heart Disease Father 64         of MI       SOCIAL HISTORY  Social History     Tobacco Use   • Smoking status: Former  "Smoker   • Smokeless tobacco: Never Used   Substance Use Topics   • Alcohol use: Yes     Comment: reportedly   • Drug use: Not on file      Social History     Substance and Sexual Activity   Drug Use Not on file       SURGICAL HISTORY  History reviewed. No pertinent surgical history.    CURRENT MEDICATIONS  Home Medications     Reviewed by Bruno De Luna (Pharmacy Tech) on 11/12/21 at 1924  Med List Status: Complete    Medication Last Dose Status    albuterol 108 (90 Base) MCG/ACT Aero Soln inhalation aerosol unk Active    buPROPion (WELLBUTRIN) 75 MG Tab unk Active    escitalopram (LEXAPRO) 10 MG Tab unk Active    ipratropium-albuterol (COMBIVENT RESPIMAT)  MCG/ACT Aero Soln unk Active    LORazepam (ATIVAN) 0.5 MG Tab unk Active    predniSONE (DELTASONE) 20 MG Tab unk Active    tiotropium (SPIRIVA RESPIMAT) 2.5 mcg/Act Aero Soln unk Active    topiramate (TOPAMAX) 25 MG Tab unk Active    traZODone (DESYREL) 100 MG Tab unk Active                ALLERGIES  No Known Allergies    PHYSICAL EXAM0  VITAL SIGNS: Blood Pressure (Abnormal) 81/60   Pulse 77   Temperature 35.9 °C (96.6 °F)   Respiration (Abnormal) 22   Height 1.6 m (5' 3\")   Weight 82.6 kg (182 lb 1.6 oz)   Oxygen Saturation 95%   Body Mass Index 32.26 kg/m²   Constitutional: Drowsy appearing, arouses to stimulus, follows some commands, but appears to lose her concentration or fall into a daze with eyes open,  in no acute distress, Non-toxic appearance.   HENT: Atraumatic. Bilateral external ears normal, mucous membranes moist, throat nonerythematous without exudates, nose is normal.  Eyes: PERRL, EOMI, conjunctiva moist, noninjected.  Neck: Nontender, Normal range of motion, No nuchal rigidity, No stridor.   Lymphatic: No lymphadenopathy noted.   Cardiovascular: Regular rate and rhythm, no murmurs, rubs, gallops.  Thorax & Lungs:  Good breath sounds bilaterally, no wheezes, rales, or retractions.  No chest tenderness.  Abdomen: Bowel sounds " normal, Soft, nontender, nondistended, no rebound, guarding, masses.  Back: No CVA or spinal tenderness.  Extremities: Intact distal pulses, No edema, No tenderness.   Skin: Warm, Dry, No rashes.   Musculoskeletal: No joint swelling or tenderness.  Neurologic: Drowsy appearing, easily falls asleep, oriented x 3, renal nerves II through XII appear intact, speech is fluent, no facial asymmetry, sensory tact to light touch, and motor function shows 5/5 strength to the upper and lower extremities.  Psychiatric: Affect normal, Judgment normal, Mood normal.     EKG  EKG Interpretation:  Interpreted by me    Rhythm:  Normal sinus rhythm   Rate: 108  Intervals: normal  Axis: normal  Ectopy: none  Conduction: normal  ST Segments: There are acute ST wave elevations in leads I, 2, 3, aVF, V3 through V6  T Waves: no acute change  Q Waves: none  Clinical Impression: Acute anterolateral MI      LABS  Labs Reviewed   APTT - Abnormal; Notable for the following components:       Result Value    APTT 102.3 (*)     All other components within normal limits    Narrative:     Indicate which anticoagulants the patient is on:->HEPARIN   PROTHROMBIN TIME - Abnormal; Notable for the following components:    PT 14.7 (*)     INR 1.19 (*)     All other components within normal limits    Narrative:     Indicate which anticoagulants the patient is on:->HEPARIN   BASIC METABOLIC PANEL - Abnormal; Notable for the following components:    Sodium 131 (*)     Glucose 176 (*)     Bun 42 (*)     Calcium 8.4 (*)     All other components within normal limits   PROBRAIN NATRIURETIC PEPTIDE, NT - Abnormal; Notable for the following components:    NT-proBNP 94309 (*)     All other components within normal limits   TROPONIN - Abnormal; Notable for the following components:    Troponin T 455 (*)     All other components within normal limits   ESTIMATED GFR - Abnormal; Notable for the following components:    GFR If Non  56 (*)     All other  "components within normal limits   POCT VENOUS BLOOD GAS DEVICE RESULTS - Abnormal; Notable for the following components:    Ph 7.139 (*)     Pco2 80.2 (*)     Po2 94 (*)     Ph Temp Correc 7.143 (*)     Pco2 Temp Samuel 79.2 (*)     Po2 Temp Corre 92 (*)     All other components within normal limits   HEPARIN XA (UNFRACTIONATED)   MAGNESIUM   CBC WITH DIFFERENTIAL   LACTIC ACID   BLOOD CULTURE    Narrative:     Per Hospital Policy: Only change Specimen Src: to \"Line\" if  specified by physician order.   CULTURE RESPIRATORY W/ GRM STN   PROCALCITONIN   HEPARIN XA (UNFRACTIONATED)    Narrative:     Indicate which anticoagulants the patient is on:->HEPARIN   ARTERIAL BLOOD GAS   BLOOD CULTURE   TROPONIN   CORTISOL   POCT SODIUM DEVICE RESULTS   POCT POTASSIUM DEVICE RESULTS   POCT IONIZED CA DEVICE RESULTS   POCT HEMATOCRIT AND HEMOGLOBIN DEVICE RESULTS       All labs reviewed by me.      RADIOLOGY/PROCEDURES  EC-ECHOCARDIOGRAM LTD W/O CONT         DX-ABDOMEN FOR TUBE PLACEMENT   Final Result      NG tube tip projects over the stomach.      DX-CHEST-PORTABLE (1 VIEW)   Final Result      1.  No pneumothorax following right IJ central catheter placement. Well-positioned ETT and central line.   2.  Bibasilar atelectasis versus consolidation and associated small to moderate right pleural effusion.      EC-ECHOCARDIOGRAM LTD W/O CONT   Final Result      CT-HEAD W/O   Final Result    Limited examination.   1.  No acute intracranial abnormality is identified.   2.  Mild left frontal scalp swelling.      DX-CHEST-LIMITED (1 VIEW)   Final Result      1.  Small right pleural effusion with overlying atelectasis/consolidation.   2.  Segmental left lower lobe atelectasis.   3.  Mild pulmonary vascular congestion.   4.  Mild cardiomegaly.      CL-LEFT HEART CATHETERIZATION WITH POSSIBLE INTERVENTION    (Results Pending)   CT-CTA HEAD WITH & W/O-POST PROCESS    (Results Pending)   CT-CEREBRAL PERFUSION ANALYSIS    (Results Pending) "   CT-CTA NECK WITH & W/O-POST PROCESSING    (Results Pending)   DX-CHEST-PORTABLE (1 VIEW)    (Results Pending)       The radiologist's interpretations of all radiological studies have been reviewed by me.        COURSE & MEDICAL DECISION MAKING  Pertinent Labs & Imaging studies reviewed. (See chart for details)  The patient presents on transfer from [facility] with an acute STEMI.  Dr. Beach of cardiology was present on arrival.  Repeat EKG was obtained which shows acute ST wave elevations in the anterior lateral leads.  There was a delay in going to the Cath Lab as there was another patient on the table.  Chest x-ray was obtained which showed a small right pleural effusion with atelectasis/consolidation.  The patient was somewhat somnolent on arrival and had received 50 mcg of fentanyl prior to arrival.  I initially thought that this was the cause of her somnolence.  She did appear to be arousable.    Prior to going to the cardiac Cath Lab, the patient became more somnolent per the cardiologist.  She was noted to be unresponsive and not moving her extremities.  Stroke protocol was initiated and patient had emergent CT scan of the head, CTA of the head and neck.  CT scan without contrast shows no acute intracranial findings.  Preliminary reading of the CTA of the neck showed stenosis of the right internal carotid artery.  Dr. Dennison of neurology was consulted, and was in to see the patient.  He felt that the patient's waxing and waning mental status was due to hypoperfusion due to the narrowing of the internal carotid artery and low blood pressure.  She did not appear to have any acute intracranial hemorrhage or stroke on CT scan.  It is recommended maintaining the patient's blood pressure at 120-140 systolically.  Cardiology has decided to wait until the patient is more stable before undergoing cardiac catheterization.  Patient will be admitted to the ICU.  Dr. Bowles the intensivist was in to see  the patient and noted her somnolence with mild respiratory distress.  Arterial blood gas showed a pH of 7.139, with a PCO2 of 80.2, PO2 of 94, oxygen saturation 94%.    The decision was made to intubate the patient because of her increased respiratory efforts, decreased mental status, and CO2 retention.  Patient was placed on bag mask ventilation.  She was on cardiac and respiratory monitors.  She was given 20 mg of etomidate, and 80 mg of rocuronium.  The patient was then intubated without difficulty with a 7.5 endotracheal tube.  The tube was visualized passing through the cords.  There was good color change on capnography, and good bilateral breath sounds auscultated.  Follow-up chest x-ray showed good placement of endotracheal tube.    I spoke with the patient's daughter and the patient's ongoing problems and treatment.  Patient was admitted to the ICU.  Critical care time of 45 minutes not including procedures.        FINAL IMPRESSION  1. Hypotension, unspecified hypotension type    2. ST elevation myocardial infarction (STEMI), unspecified artery (HCC)    3. Acute respiratory failure with hypoxia ([facility])    4. ST elevation myocardial infarction involving left anterior descending (LAD) coronary artery (HCC)    5. Stenosis of right carotid artery    6. Altered mental status, unspecified altered mental status type    7. Acute respiratory failure with hypercapnia ([facility])          Electronically signed by: Jaxson Panda M.D., 11/12/2021 5:21 PM

## 2021-11-13 NOTE — CONSULTS
[facility] Stroke Consult:    Referring Physician: Max Saleem M.D.    Reason for consultation: Stroke      TPA Decision:  No TPA due to TNK administration    HPI: Stella Prieto is a 66 y.o. female with history of COPD, hypertension, STEMI presenting to the hospital for STEMI and consulted for stroke.  Patient was seen at [place] with chest pain for 8 days.  She had ST elevation MI there and she was transferred here for further evaluation.  She was given TNKase, was placed on a nitro drip and a heparin drip.  Upon arriving she was noted to have normal mentation however at around 5 PM she started developing some altered mental status and was not moving her left side as readily so stroke alert was called.  Of note patient's blood pressures have been in the 80s and 90s systolic.  CT head without contrast was negative for hemorrhage.  CT a of the head and neck showed carotid stenosis on the right.     ROS:     Unable to obtain due to altered mental status  Past Medical History:    has a past medical history of COPD (chronic obstructive pulmonary disease) ([facility]), Hypertension, ST elevation myocardial infarction involving left anterior descending (LAD) coronary artery ([facility]) (11/12/2021), and ST elevation myocardial infarction involving left anterior descending (LAD) coronary artery ([facility]) (11/12/2021). She also has no past medical history of Stroke ([facility]).    FHx:  family history includes Heart Disease (age of onset: 64) in her father.    SHx:   reports that she has quit smoking. She has never used smokeless tobacco. She reports current alcohol use.    Allergies:  No Known Allergies    Medications:    Current Facility-Administered Medications:   •  heparin infusion 25,000 units in 500 mL 0.45% NACL, 0-30 Units/kg/hr (Adjusted), Intravenous, Continuous, Isaiah Beach M.D., Last Rate: 15.2 mL/hr at 11/12/21 1710, 12 Units/kg/hr at 11/12/21 1710  •  heparin injection 1,900 Units, 30 Units/kg (Adjusted),  "Intravenous, PRN, Isaiah Beach M.D.  No current outpatient medications on file.    Vitals:   Vitals:    11/12/21 1700   Temp: 36.7 °C (98 °F)   Weight: 80 kg (176 lb 5.9 oz)   Height: 1.6 m (5' 3\")       Labs:  Lab Results   Component Value Date/Time    PROTHROMBTM 14.7 (H) 11/12/2021 04:49 PM    INR 1.19 (H) 11/12/2021 04:49 PM      No results found for: WBC, RBC, HEMOGLOBIN, HEMATOCRIT, MCV, MCH, MCHC, MPV, NEUTSPOLYS, LYMPHOCYTES, MONOCYTES, EOSINOPHILS, BASOPHILS, HYPOCHROMIA, ANISOCYTOSIS   No results found for: SODIUM, POTASSIUM, CHLORIDE, CO2, GLUCOSE, BUN, CREATININE, BUNCREATRAT, GLOMRATE   No results found for: CHOLSTRLTOT, LDL, HDL, TRIGLYCERIDE    No results found for: ALKPHOSPHAT, ASTSGOT, ALTSGPT, TBILIRUBIN     Imaging:  CT head without contrast personally reviewed without evidence of acute ischemia or hemorrhage.    CTA of the head and neck reviewed in chart    CT perfusion reviewed in chart.    Physical Exam:     General: 66-year-old female in bed in no acute distress.  Cardio: Normal S1/S2. No peripheral edema.   Pulm: CTAX2. No respiratory distress.   Skin: Warm, dry, no rashes or lesions   Psychiatric: Appropriate affect. No active psychosis.  HEENT: Atraumatic head, normal sclera and conjunctiva, moist oral mucosa. No lid lag.  Abdomen: Soft, non tender. No masses or hepatosplenomegaly.    Physical Exam:    NIH Stroke Scale:    1a. Level of Consciousness (Alert, drowsy, etc): 0= Alert    1b. LOC Questions (Month, age): 0= Answers both correctly    1c. LOC Commands (Open/close eyes make fist/let go): 0= Obeys both correctly    2.   Best Gaze (Eyes open - patient follows examiner's finger on face): 0= Normal    3.   Visual Fields (introduce visual stimulus/threat to patient's field quadrants): 0= No visual loss  4.   Facial Paresis (Show teeth, raise eyebrows and squeeze eyes shut): 0= Normal     5a. Motor Arm - Left (Elevate arm to 90 degrees if patient is sitting, 45 degrees if  " supine): 1= Drift    5b. Motor Arm - Right (Elevate arm to 90 degrees if patient is sitting, 45 degrees if supine): 0= No drift    6a. Motor Leg - Left (Elevate leg 30 degrees with patient supine): 0= No drift    6b. Motor Leg - Right  (Elevate leg 30 degrees with patient supine): 0= No drift    7.   Limb Ataxia (Finger-nose, heel down shin): 0= No ataxia    8.   Sensory (Pin prick to face, arm, trunk and leg - compare side to side): 0= Normal    9.  Best Language (Name item, describe a picture and read sentences): 0= No aphasia    10. Dysarthria (Evaluate speech clarity by patient repeating listed words): 1= Mild to moderate slurring    11. Extinction and Inattention (Use information from prior testing to identify neglect or  double simultaneous stimuli testing): 0= No neglect    Total NIH Score: 2    Assessment/Plan:    Stella Prieto is a 66 y.o. female with history of COPD, hypertension, STEMI presenting to the hospital for STEMI and consulted for stroke.  Patient appears to have some drift of her left upper extremity as well as some mild dysarthria.  She also has unilateral asterixis on the left upper extremity and appears to be encephalopathic.  Her systolic blood pressures in the 90s off nitro drip.  CTA of the neck demonstrated carotid stenosis.  It is likely that the patient's clinical picture is then explained by hypoperfusion state of the right hemisphere.  Would recommend that the patient's blood pressure be increased.  I did not appreciate a large vessel occlusion on CTA.  She is nevertheless not a TPA candidate given that she received TNKase and heparin.    Plan:  1.  Increase systolic blood pressure to at least 120, ideally 140.  2.  MRI brain without contrast whenever able.  This is likely to show a watershed ischemia if there is any.  3.  Management of STEMI and others per ICU and cardiology.  4.  Call with further questions.  5.  Plan discussed with consulting physician and patient's nurse      Federico  ROXIE Dennison M.D., Diplomat of the American Board of Psychiatry and Neurology  Diplomat of [facility]   Assistant Clinical Professor, [facility] Neurology Consultant

## 2021-11-13 NOTE — DISCHARGE PLANNING
STEMI Response    Referral: Code STEMI Response    Intervention: SW responded to Code STEMI.  Pt was BIB [facility] after chest pain.  Pt was alert upon arrival.  Pts name is Stella Prieto (: 59).  SW obtained the following pt information: Pt was a transfer from [facility].  SW was asked to contact Pt's, DTR Esha.  SW called Pt's DTR and updated that Pt arrived and per report that Pt was going to be going to the Cath lab.  DTR is on her way to [place].     DTR: Esha [phone]    Plan: SW will continue to monitor and assist if needs arise.    1800:  SW placed follow up call to Pt's DTR and updated her that Pt has stayed in the ER and she is currently in ER room 3.  Esha informed this SW that she had been updated by the Cardiologist.  She also stated that she is about 30 minutes away from the hospital.

## 2021-11-13 NOTE — PROCEDURES
Central Line Insertion    Date/Time: 11/12/2021 7:13 PM  Performed by: Amrit Bowles M.D.  Authorized by: Amrit Bowles M.D.     Consent:     Consent obtained:  Emergent situation    Consent given by:  Healthcare agent    Alternatives discussed:  Delayed treatment  Pre-procedure details:     Hand hygiene: Hand hygiene performed prior to insertion      Sterile barrier technique: All elements of maximal sterile technique followed      Skin preparation:  ChloraPrep    Skin preparation agent: Skin preparation agent completely dried prior to procedure    Sedation:     Sedation type:  Deep (just intubated with etomidate)  Anesthesia:     Anesthesia method:  None  Procedure details:     Location:  R internal jugular    Patient position:  Trendelenburg    Procedural supplies:  Triple lumen    Catheter size:  7 Fr    Landmarks identified: yes      Ultrasound guidance: yes      Sterile ultrasound techniques: Sterile gel and sterile probe covers were used      Number of attempts:  1    Successful placement: yes    Post-procedure details:     Post-procedure:  Dressing applied and line sutured    Assessment:  Blood return through all ports, free fluid flow and no pneumothorax on x-ray    Patient tolerance of procedure:  Tolerated well, no immediate complications

## 2021-11-14 ENCOUNTER — APPOINTMENT (OUTPATIENT)
Dept: RADIOLOGY | Facility: MEDICAL CENTER | Age: 66
DRG: 280 | End: 2021-11-14
Attending: INTERNAL MEDICINE
Payer: MEDICARE

## 2021-11-14 LAB
ALBUMIN SERPL BCP-MCNC: 2.6 G/DL (ref 3.2–4.9)
ALBUMIN/GLOB SERPL: 0.8 G/DL
ALP SERPL-CCNC: 144 U/L (ref 30–99)
ALT SERPL-CCNC: 17 U/L (ref 2–50)
ANION GAP SERPL CALC-SCNC: 13 MMOL/L (ref 7–16)
APPEARANCE FLD: NORMAL
AST SERPL-CCNC: 13 U/L (ref 12–45)
BACTERIA SPEC RESP CULT: NORMAL
BASE EXCESS BLDA CALC-SCNC: -1 MMOL/L (ref -4–3)
BASOPHILS # BLD AUTO: 0 % (ref 0–1.8)
BASOPHILS # BLD: 0 K/UL (ref 0–0.12)
BILIRUB SERPL-MCNC: 0.3 MG/DL (ref 0.1–1.5)
BODY FLD TYPE: NORMAL
BODY TEMPERATURE: ABNORMAL DEGREES
BREATHS SETTING VENT: 26
BUN SERPL-MCNC: 58 MG/DL (ref 8–22)
CALCIUM SERPL-MCNC: 8.4 MG/DL (ref 8.5–10.5)
CHLORIDE SERPL-SCNC: 100 MMOL/L (ref 96–112)
CO2 BLDA-SCNC: 23 MMOL/L (ref 20–33)
CO2 SERPL-SCNC: 23 MMOL/L (ref 20–33)
COLOR FLD: NORMAL
CREAT SERPL-MCNC: 1.27 MG/DL (ref 0.5–1.4)
CSF COMMENTS 1658: NORMAL
DELSYS IDSYS: ABNORMAL
EKG IMPRESSION: NORMAL
END TIDAL CARBON DIOXIDE IECO2: 26 MMHG
EOSINOPHIL # BLD AUTO: 0 K/UL (ref 0–0.51)
EOSINOPHIL NFR BLD: 0 % (ref 0–6.9)
EOSINOPHIL NFR FLD: 2 %
ERYTHROCYTE [DISTWIDTH] IN BLOOD BY AUTOMATED COUNT: 48.8 FL (ref 35.9–50)
GLOBULIN SER CALC-MCNC: 3.1 G/DL (ref 1.9–3.5)
GLUCOSE BLD-MCNC: 128 MG/DL (ref 65–99)
GLUCOSE BLD-MCNC: 146 MG/DL (ref 65–99)
GLUCOSE BLD-MCNC: 147 MG/DL (ref 65–99)
GLUCOSE BLD-MCNC: 99 MG/DL (ref 65–99)
GLUCOSE FLD-MCNC: 61 MG/DL
GLUCOSE SERPL-MCNC: 146 MG/DL (ref 65–99)
GRAM STN SPEC: NORMAL
GRAM STN SPEC: NORMAL
HCO3 BLDA-SCNC: 22.2 MMOL/L (ref 17–25)
HCT VFR BLD AUTO: 29.3 % (ref 37–47)
HGB BLD-MCNC: 9.6 G/DL (ref 12–16)
HOROWITZ INDEX BLDA+IHG-RTO: 215 MM[HG]
LDH FLD L TO P-CCNC: 515 U/L
LYMPHOCYTES # BLD AUTO: 1.23 K/UL (ref 1–4.8)
LYMPHOCYTES NFR BLD: 3.5 % (ref 22–41)
LYMPHOCYTES NFR FLD: 1 %
MAGNESIUM SERPL-MCNC: 2.4 MG/DL (ref 1.5–2.5)
MANUAL DIFF BLD: NORMAL
MCH RBC QN AUTO: 30.2 PG (ref 27–33)
MCHC RBC AUTO-ENTMCNC: 32.8 G/DL (ref 33.6–35)
MCV RBC AUTO: 92.1 FL (ref 81.4–97.8)
METAMYELOCYTES NFR BLD MANUAL: 3.5 %
MODE IMODE: ABNORMAL
MONOCYTES # BLD AUTO: 0.63 K/UL (ref 0–0.85)
MONOCYTES NFR BLD AUTO: 1.8 % (ref 0–13.4)
MONONUC CELLS NFR FLD: 6 %
MORPHOLOGY BLD-IMP: NORMAL
MYELOCYTES NFR BLD MANUAL: 3.5 %
NEUTROPHILS # BLD AUTO: 30.55 K/UL (ref 2–7.15)
NEUTROPHILS NFR BLD: 84.2 % (ref 44–72)
NEUTROPHILS NFR FLD: 91 %
NEUTS BAND NFR BLD MANUAL: 2.6 % (ref 0–10)
NRBC # BLD AUTO: 0.03 K/UL
NRBC BLD-RTO: 0.1 /100 WBC
O2/TOTAL GAS SETTING VFR VENT: 40 %
PCO2 BLDA: 32.5 MMHG (ref 26–37)
PCO2 TEMP ADJ BLDA: 33.9 MMHG (ref 26–37)
PEEP END EXPIRATORY PRESSURE IPEEP: 8 CMH20
PH BLDA: 7.44 [PH] (ref 7.4–7.5)
PH FLD: 7 [PH]
PH TEMP ADJ BLDA: 7.43 [PH] (ref 7.4–7.5)
PHOSPHATE SERPL-MCNC: 3.8 MG/DL (ref 2.5–4.5)
PLATELET # BLD AUTO: 456 K/UL (ref 164–446)
PLATELET BLD QL SMEAR: NORMAL
PMV BLD AUTO: 9.9 FL (ref 9–12.9)
PO2 BLDA: 86 MMHG (ref 64–87)
PO2 TEMP ADJ BLDA: 92 MMHG (ref 64–87)
POTASSIUM SERPL-SCNC: 4.2 MMOL/L (ref 3.6–5.5)
PROMYELOCYTES NFR BLD MANUAL: 0.9 %
PROT FLD-MCNC: 3.9 G/DL
PROT SERPL-MCNC: 5.7 G/DL (ref 6–8.2)
RBC # BLD AUTO: 3.18 M/UL (ref 4.2–5.4)
RBC # FLD: NORMAL CELLS/UL
RBC BLD AUTO: NORMAL
SAO2 % BLDA: 97 % (ref 93–99)
SIGNIFICANT IND 70042: NORMAL
SIGNIFICANT IND 70042: NORMAL
SITE SITE: NORMAL
SITE SITE: NORMAL
SODIUM SERPL-SCNC: 136 MMOL/L (ref 135–145)
SOURCE SOURCE: NORMAL
SOURCE SOURCE: NORMAL
SPECIMEN DRAWN FROM PATIENT: ABNORMAL
TIDAL VOLUME IVT: 380 ML
UFH PPP CHRO-ACNC: 0.23 IU/ML
UFH PPP CHRO-ACNC: 0.25 IU/ML
UFH PPP CHRO-ACNC: 0.41 IU/ML
WBC # BLD AUTO: 35.2 K/UL (ref 4.8–10.8)
WBC # FLD: NORMAL CELLS/UL

## 2021-11-14 PROCEDURE — 82945 GLUCOSE OTHER FLUID: CPT

## 2021-11-14 PROCEDURE — 82803 BLOOD GASES ANY COMBINATION: CPT

## 2021-11-14 PROCEDURE — 32555 ASPIRATE PLEURA W/ IMAGING: CPT | Mod: RT | Performed by: INTERNAL MEDICINE

## 2021-11-14 PROCEDURE — A9270 NON-COVERED ITEM OR SERVICE: HCPCS | Performed by: INTERNAL MEDICINE

## 2021-11-14 PROCEDURE — 87070 CULTURE OTHR SPECIMN AEROBIC: CPT

## 2021-11-14 PROCEDURE — 84100 ASSAY OF PHOSPHORUS: CPT

## 2021-11-14 PROCEDURE — 99291 CRITICAL CARE FIRST HOUR: CPT | Performed by: INTERNAL MEDICINE

## 2021-11-14 PROCEDURE — 700111 HCHG RX REV CODE 636 W/ 250 OVERRIDE (IP): Performed by: INTERNAL MEDICINE

## 2021-11-14 PROCEDURE — 700102 HCHG RX REV CODE 250 W/ 637 OVERRIDE(OP): Performed by: INTERNAL MEDICINE

## 2021-11-14 PROCEDURE — 85007 BL SMEAR W/DIFF WBC COUNT: CPT

## 2021-11-14 PROCEDURE — 93880 EXTRACRANIAL BILAT STUDY: CPT

## 2021-11-14 PROCEDURE — 37799 UNLISTED PX VASCULAR SURGERY: CPT

## 2021-11-14 PROCEDURE — 71045 X-RAY EXAM CHEST 1 VIEW: CPT

## 2021-11-14 PROCEDURE — 85520 HEPARIN ASSAY: CPT

## 2021-11-14 PROCEDURE — 94799 UNLISTED PULMONARY SVC/PX: CPT

## 2021-11-14 PROCEDURE — 99291 CRITICAL CARE FIRST HOUR: CPT | Mod: 25 | Performed by: INTERNAL MEDICINE

## 2021-11-14 PROCEDURE — 700105 HCHG RX REV CODE 258: Performed by: INTERNAL MEDICINE

## 2021-11-14 PROCEDURE — 83735 ASSAY OF MAGNESIUM: CPT

## 2021-11-14 PROCEDURE — 83615 LACTATE (LD) (LDH) ENZYME: CPT

## 2021-11-14 PROCEDURE — 88305 TISSUE EXAM BY PATHOLOGIST: CPT

## 2021-11-14 PROCEDURE — 87205 SMEAR GRAM STAIN: CPT

## 2021-11-14 PROCEDURE — 89051 BODY FLUID CELL COUNT: CPT

## 2021-11-14 PROCEDURE — 87015 SPECIMEN INFECT AGNT CONCNTJ: CPT

## 2021-11-14 PROCEDURE — 80053 COMPREHEN METABOLIC PANEL: CPT

## 2021-11-14 PROCEDURE — 700101 HCHG RX REV CODE 250: Performed by: INTERNAL MEDICINE

## 2021-11-14 PROCEDURE — 84157 ASSAY OF PROTEIN OTHER: CPT

## 2021-11-14 PROCEDURE — 87075 CULTR BACTERIA EXCEPT BLOOD: CPT

## 2021-11-14 PROCEDURE — 85027 COMPLETE CBC AUTOMATED: CPT

## 2021-11-14 PROCEDURE — 770022 HCHG ROOM/CARE - ICU (200)

## 2021-11-14 PROCEDURE — 82962 GLUCOSE BLOOD TEST: CPT

## 2021-11-14 PROCEDURE — 32554 ASPIRATE PLEURA W/O IMAGING: CPT

## 2021-11-14 PROCEDURE — 99292 CRITICAL CARE ADDL 30 MIN: CPT | Mod: 25 | Performed by: INTERNAL MEDICINE

## 2021-11-14 PROCEDURE — 94003 VENT MGMT INPAT SUBQ DAY: CPT

## 2021-11-14 PROCEDURE — 0W993ZZ DRAINAGE OF RIGHT PLEURAL CAVITY, PERCUTANEOUS APPROACH: ICD-10-PCS | Performed by: INTERNAL MEDICINE

## 2021-11-14 PROCEDURE — 93010 ELECTROCARDIOGRAM REPORT: CPT | Performed by: INTERNAL MEDICINE

## 2021-11-14 PROCEDURE — 93005 ELECTROCARDIOGRAM TRACING: CPT | Performed by: INTERNAL MEDICINE

## 2021-11-14 PROCEDURE — 83986 ASSAY PH BODY FLUID NOS: CPT

## 2021-11-14 PROCEDURE — 88112 CYTOPATH CELL ENHANCE TECH: CPT

## 2021-11-14 RX ORDER — MIDAZOLAM HYDROCHLORIDE 1 MG/ML
1-5 INJECTION INTRAMUSCULAR; INTRAVENOUS
Status: COMPLETED | OUTPATIENT
Start: 2021-11-14 | End: 2021-11-14

## 2021-11-14 RX ORDER — DOXYCYCLINE 100 MG/1
100 TABLET ORAL EVERY 12 HOURS
Status: DISCONTINUED | OUTPATIENT
Start: 2021-11-14 | End: 2021-11-15

## 2021-11-14 RX ADMIN — FENTANYL CITRATE 100 MCG: 50 INJECTION, SOLUTION INTRAMUSCULAR; INTRAVENOUS at 11:18

## 2021-11-14 RX ADMIN — VASOPRESSIN 0.03 UNITS/MIN: 20 INJECTION INTRAVENOUS at 08:23

## 2021-11-14 RX ADMIN — TOPIRAMATE 75 MG: 25 TABLET, FILM COATED ORAL at 18:13

## 2021-11-14 RX ADMIN — HEPARIN SODIUM 1900 UNITS: 1000 INJECTION INTRAVENOUS; SUBCUTANEOUS at 21:03

## 2021-11-14 RX ADMIN — ESCITALOPRAM OXALATE 10 MG: 10 TABLET ORAL at 18:13

## 2021-11-14 RX ADMIN — HYDROCORTISONE SODIUM SUCCINATE 50 MG: 100 INJECTION, POWDER, FOR SOLUTION INTRAMUSCULAR; INTRAVENOUS at 12:06

## 2021-11-14 RX ADMIN — DEXMEDETOMIDINE 1 MCG/KG/HR: 200 INJECTION, SOLUTION INTRAVENOUS at 06:44

## 2021-11-14 RX ADMIN — SENNOSIDES AND DOCUSATE SODIUM 2 TABLET: 50; 8.6 TABLET ORAL at 05:52

## 2021-11-14 RX ADMIN — FAMOTIDINE 20 MG: 20 TABLET ORAL at 05:52

## 2021-11-14 RX ADMIN — DOXYCYCLINE 100 MG: 100 TABLET, FILM COATED ORAL at 18:13

## 2021-11-14 RX ADMIN — MIDAZOLAM HYDROCHLORIDE 5 MG: 1 INJECTION, SOLUTION INTRAMUSCULAR; INTRAVENOUS at 10:40

## 2021-11-14 RX ADMIN — ASPIRIN 81 MG: 81 TABLET, CHEWABLE ORAL at 05:52

## 2021-11-14 RX ADMIN — ACETAMINOPHEN 650 MG: 325 TABLET, FILM COATED ORAL at 06:50

## 2021-11-14 RX ADMIN — HEPARIN SODIUM 12 UNITS/KG/HR: 5000 INJECTION, SOLUTION INTRAVENOUS at 00:34

## 2021-11-14 RX ADMIN — NOREPINEPHRINE BITARTRATE 1 MCG/MIN: 1 INJECTION, SOLUTION, CONCENTRATE INTRAVENOUS at 06:45

## 2021-11-14 RX ADMIN — HYDROCORTISONE SODIUM SUCCINATE 50 MG: 100 INJECTION, POWDER, FOR SOLUTION INTRAMUSCULAR; INTRAVENOUS at 05:53

## 2021-11-14 RX ADMIN — HYDROCORTISONE SODIUM SUCCINATE 50 MG: 100 INJECTION, POWDER, FOR SOLUTION INTRAMUSCULAR; INTRAVENOUS at 18:09

## 2021-11-14 RX ADMIN — DOXYCYCLINE 100 MG: 100 INJECTION, POWDER, LYOPHILIZED, FOR SOLUTION INTRAVENOUS at 06:29

## 2021-11-14 RX ADMIN — FENTANYL CITRATE 100 MCG: 50 INJECTION, SOLUTION INTRAMUSCULAR; INTRAVENOUS at 05:22

## 2021-11-14 RX ADMIN — HYDROCORTISONE SODIUM SUCCINATE 50 MG: 100 INJECTION, POWDER, FOR SOLUTION INTRAMUSCULAR; INTRAVENOUS at 00:34

## 2021-11-14 RX ADMIN — CEFTRIAXONE SODIUM 1 G: 1 INJECTION, POWDER, FOR SOLUTION INTRAMUSCULAR; INTRAVENOUS at 05:52

## 2021-11-14 RX ADMIN — BUPROPION HYDROCHLORIDE 150 MG: 100 TABLET, FILM COATED ORAL at 05:52

## 2021-11-14 ASSESSMENT — PAIN DESCRIPTION - PAIN TYPE
TYPE: ACUTE PAIN

## 2021-11-14 ASSESSMENT — COPD QUESTIONNAIRES
DURING THE PAST 4 WEEKS HOW MUCH DID YOU FEEL SHORT OF BREATH: SOME OF THE TIME
HAVE YOU SMOKED AT LEAST 100 CIGARETTES IN YOUR ENTIRE LIFE: YES
COPD SCREENING SCORE: 6
DO YOU EVER COUGH UP ANY MUCUS OR PHLEGM?: YES, A FEW DAYS A WEEK OR MONTH

## 2021-11-14 NOTE — PROGRESS NOTES
"Critical Care Progress Note    Date of admission  11/12/2021    Chief Complaint  66 y.o. female admitted 11/12/2021 with STEMI, AMS, respiratory failure    Hospital Course  \"66 y.o. female history of COPD, diabetes and hypertension who presented 11/12/2021 with chest pain to [facility], ER.  EKG was consistent with STEMI and patient was given TNKase, heparin, nitro, aspirin and then sent to our facility for possible coronary artery angiogram.  Daughter gave a history of cough congestion and bronchitis-like symptoms for a week or so prior to admission.  She was given ceftriaxone prior to transfer for possible right lower lobe pneumonia.  On arrival here she is altered and had left-sided plegia and code stroke was called.  She had significant carotid stenosis but no intervention was suggested by exam or other images after neurology review.  However the stenosis was bad enough where neurology requested a blood pressure 120-140 range if possible since she neurologically was better at that higher pressure presumably due to better flow across the right carotid stenotic region.  I arrived to see her in the ER and she had some labored breathing and was altered for me and blood gas was being obtained that time and her PCO2 was in the 80s.  She was intubated and a central line was placed emergently.  She dropped her blood pressure after intubation.  500 cc fluid bolus was given and repeated x1 after reviewing the initial echocardiogram with cardiology who felt she was on the dry side by imaging.  Norepinephrine infusion was initiated and and initially blood pressure stabilized in the 120s on norepinephrine at 12.  See was subsequently moved to the [facility] and put tension persisted requiring additional pressors after norepinephrine was maxed at 30.  Follow-up EKG actually looked a little bit better and follow-up echocardiogram was unchanged.  Lactic acid was normal at 1.8.  Cardiology and I reviewed the case at length and our " "presumption is this is a neurogenic hemodynamic process.  The addition of epinephrine raised her heart rate but really did not help her blood pressure.  Transiently when her blood pressure was 140 she actually was spontaneously moving her right side and trying to open her eyes.  Currently adding vasopressin and will try Jem-Synephrine as well in hopes to wean off epinephrine and get her heart rate back down in the face of a STEMI.  Reviewed the option of stenting of coronary or stenting the carotid with cardiology and neurology and both think the risk outweigh the benefits at this time.  Patient is currently post TNK on a heparin drip.\" - Dr. Bowles 11/12      Interval Problem Update  Reviewed last 24 hour events:   - pressors weaning down   - improving acidosis and oxygenation   - awake and follows with improving LUE/LLE weakness   - precedex gtt   - sinus laura, -140 on vaso + levophed gtt   - NPO   - ok UOP   - CVP 12-13   - VD # 3, decrease RR, pull back ETT 2 cm   - heparin gtt   - ASA   - day 2 doxy and rocephin   - stress dose steroids   - ASA, statin   - unchanged VAL[place]INA   - MRI brain pending    Review of Systems  Review of Systems   Unable to perform ROS: Intubated        Vital Signs for last 24 hours   Pulse:  [] 52  Resp:  [16-36] 26  BP: ()/(48-78) 89/48  SpO2:  [87 %-98 %] 96 %    Hemodynamic parameters for last 24 hours  CVP:  [5 MM HG-13 MM HG] 11 MM HG    Respiratory Information for the last 24 hours  Vent Mode: APVCMV  Rate (breaths/min): 26  Vt Target (mL): 380  PEEP/CPAP: 8  MAP: 13  Control VTE (exp VT): 380    Physical Exam   Physical Exam  Vitals and nursing note reviewed.   Constitutional:       General: She is awake.      Appearance: She is overweight. She is ill-appearing.      Interventions: She is sedated, intubated and restrained.   HENT:      Head: Normocephalic and atraumatic.      Nose: Nose normal.      Comments: Nasal feeding tube in place     Mouth/Throat:      " Mouth: Mucous membranes are moist.      Pharynx: No oropharyngeal exudate.      Comments: Endotracheal tube in place  Eyes:      General: No scleral icterus.     Conjunctiva/sclera: Conjunctivae normal.      Pupils: Pupils are equal, round, and reactive to light.      Comments: Unable to deviate eyes to the left   Neck:      Comments: Right IJ central venous catheter without surrounding erythema  Cardiovascular:      Rate and Rhythm: Regular rhythm. Bradycardia present. Occasional extrasystoles are present.     Chest Wall: PMI is displaced.      Pulses: Decreased pulses.      Heart sounds: No murmur heard.       Comments: Still requiring vasopressor support for shock  Pulmonary:      Effort: No tachypnea. She is intubated.      Breath sounds: Examination of the right-middle field reveals decreased breath sounds and rhonchi. Examination of the right-lower field reveals decreased breath sounds and rhonchi. Examination of the left-lower field reveals rhonchi. Decreased breath sounds and rhonchi present. No wheezing.      Comments: Persistent moderate size complicated effusion seen on bedside chest ultrasound on the right  Abdominal:      General: Bowel sounds are normal. There is no distension.      Palpations: Abdomen is soft.      Tenderness: There is no abdominal tenderness. There is no guarding.   Genitourinary:     Comments: Barry catheter in place  Musculoskeletal:         General: No tenderness.      Cervical back: Neck supple.      Right lower leg: No edema.      Left lower leg: No edema.   Skin:     General: Skin is warm and dry.      Capillary Refill: Capillary refill takes 2 to 3 seconds.      Findings: No erythema.   Neurological:      Mental Status: She is alert.      Comments: Improving left-sided weakness, follows, makes eye contact and nods appropriately   Psychiatric:         Behavior: Behavior is cooperative.      Comments: Unable to assess given current clinical condition         Medications  Current  Facility-Administered Medications   Medication Dose Route Frequency Provider Last Rate Last Admin   • acetaminophen (Tylenol) tablet 650 mg  650 mg Enteral Tube Q4HRS PRN Jeremy M Gonda, M.D.   650 mg at 11/14/21 0650   • hydrocortisone sodium succinate PF (Solu-CORTEF) 100 MG injection 50 mg  50 mg Intravenous Q6HRS Jeremy M Gonda, M.D.   50 mg at 11/14/21 0553   • insulin regular (HumuLIN R,NovoLIN R) injection  2-9 Units Subcutaneous Q6HRS Jeremy M Gonda, M.D.        And   • dextrose 50% (D50W) injection 50 mL  50 mL Intravenous Q15 MIN PRN Jeremy M Gonda, M.D.       • aspirin (ASA) chewable tab 81 mg  81 mg Enteral Tube DAILY Jeremy M Gonda, M.D.   81 mg at 11/14/21 0552   • atorvastatin (LIPITOR) tablet 40 mg  40 mg Enteral Tube Q EVENING Jeremy M Gonda, M.D.   40 mg at 11/13/21 1718   • famotidine (PEPCID) tablet 20 mg  20 mg Enteral Tube DAILY Jeremy M Gonda, M.D.   20 mg at 11/14/21 0552    Or   • famotidine (PEPCID) injection 20 mg  20 mg Intravenous DAILY Jeremy M Gonda, M.D.       • buPROPion (WELLBUTRIN) tablet 150 mg  150 mg Enteral Tube QAM Jeremy M Gonda, M.D.   150 mg at 11/14/21 0552   • escitalopram (Lexapro) tablet 10 mg  10 mg Enteral Tube Q EVENING Jeremy M Gonda, M.D.   10 mg at 11/13/21 1718   • topiramate (TOPAMAX) tablet 75 mg  75 mg Enteral Tube Q EVENING Jeremy M Gonda, M.D.   75 mg at 11/13/21 1717   • heparin infusion 25,000 units in 500 mL 0.45% NACL  0-30 Units/kg/hr (Adjusted) Intravenous Continuous Isaiah Beach M.D. 17.8 mL/hr at 11/14/21 0050 14 Units/kg/hr at 11/14/21 0050   • heparin injection 1,900 Units  30 Units/kg (Adjusted) Intravenous PRN Isaiah Beach M.D.   1,900 Units at 11/13/21 0050   • fentaNYL (SUBLIMAZE) injection 50 mcg  50 mcg Intravenous Q15 MIN PRN Jeremy M Gonda, M.D.        And   • fentaNYL (SUBLIMAZE) injection 100 mcg  100 mcg Intravenous Q15 MIN PRN Jeremy M Gonda, M.D.   100 mcg at 11/14/21 0522    And   • fentaNYL (SUBLIMAZE) 50 mcg/mL in  50mL (Continuous Infusion)   Intravenous Continuous Jeremy M Gonda, M.D. 2 mL/hr at 11/13/21 1859 100 mcg/hr at 11/13/21 1859    And   • dexmedetomidine (PRECEDEX) 400 mcg/100mL NS premix infusion  0.1-1.5 mcg/kg/hr Intravenous Continuous Jeremy M Gonda, M.D. 14 mL/hr at 11/14/21 0646 0.7 mcg/kg/hr at 11/14/21 0646   • norepinephrine (Levophed) 8 mg in 250 mL NS infusion (premix)  0-30 mcg/min Intravenous Continuous Amrit Bowles M.D. 1.9 mL/hr at 11/14/21 0645 1 mcg/min at 11/14/21 0645   • senna-docusate (PERICOLACE or SENOKOT S) 8.6-50 MG per tablet 2 Tablet  2 Tablet Enteral Tube BID Amrit Bowles M.D.   2 Tablet at 11/14/21 0552    And   • polyethylene glycol/lytes (MIRALAX) PACKET 1 Packet  1 Packet Enteral Tube QDAY PRN Amrit Bowles M.D.        And   • magnesium hydroxide (MILK OF MAGNESIA) suspension 30 mL  30 mL Enteral Tube QDAY PRN Amrit Bowles M.D.        And   • bisacodyl (DULCOLAX) suppository 10 mg  10 mg Rectal QDAY PRN Amrit Bowles M.D.       • MD Alert...ICU Electrolyte Replacement per Pharmacy   Other PHARMACY TO DOSE Amrit Bowles M.D.       • lidocaine (XYLOCAINE) 1 % injection 2 mL  2 mL Tracheal Tube Q30 MIN PRN Amrit Bowles M.D.       • Respiratory Therapy Consult   Nebulization Continuous RT Amrit Bowles M.D.       • ipratropium-albuterol (DUONEB) nebulizer solution  3 mL Nebulization Q2HRS PRN (RT) Amrit Bowles M.D.   3 mL at 11/12/21 2313   • cefTRIAXone (Rocephin) 1 g in  mL IVPB  1 g Intravenous Q24HRS Amrit Bowles M.D.   Stopped at 11/14/21 0622   • doxycycline (VIBRAMYCIN) 100 mg in  mL IVPB  100 mg Intravenous Q12HRS Amrit Bowles M.D. 100 mL/hr at 11/14/21 0629 100 mg at 11/14/21 0629   • EPINEPHrine (Adrenalin) infusion 4 mg/250 mL (premix)  0-10 mcg/min Intravenous Continuous Amrit Bowles M.D.   Stopped at 11/13/21 0613   • vasopressin (VASOSTRICT) 20 Units in  mL Infusion  0.03 Units/min  Intravenous Continuous Amrit Bowles M.D. 9 mL/hr at 11/13/21 2024 0.03 Units/min at 11/13/21 2024       Fluids    Intake/Output Summary (Last 24 hours) at 11/14/2021 0653  Last data filed at 11/14/2021 0400  Gross per 24 hour   Intake 2335.58 ml   Output 825 ml   Net 1510.58 ml       Laboratory  Recent Labs     11/12/21  2304 11/13/21  0246 11/14/21  0444   ISTATAPH 7.224* 7.349* 7.443   ISTATAPCO2 49.4* 42.3* 32.5   ISTATAPO2 75 56* 86   ISTATATCO2 22 25 23   WNNIQGA3FFL 92* 87* 97   ISTATARTHCO3 20.4 23.3 22.2   ISTATARTBE -7* -2 -1   ISTATTEMP 37.1 C 38.2 C 37.9 C   ISTATFIO2 50 30 40   ISTATSPEC Arterial Arterial Arterial   ISTATAPHTC 7.222* 7.331* 7.429   NNQMTHND0GN 75 61* 92*         Recent Labs     11/12/21 2153 11/12/21 2153 11/13/21 0400 11/13/21  1050 11/14/21  0305   SODIUM 131*   < > 132* 135 136   POTASSIUM 4.9   < > 4.4 4.6 4.2   CHLORIDE 96   < > 94* 100 100   CO2 21   < > 22 21 23   BUN 42*   < > 43* 51* 58*   CREATININE 1.00   < > 1.19 1.29 1.27   MAGNESIUM 2.2  --  2.3  --  2.4   PHOSPHORUS  --   --  5.8*  --  3.8   CALCIUM 8.4*   < > 8.3* 7.9* 8.4*    < > = values in this interval not displayed.     Recent Labs     11/13/21 0400 11/13/21  1050 11/14/21  0305   ALTSGPT  --  21 17   ASTSGOT  --  16 13   ALKPHOSPHAT  --  185* 144*   TBILIRUBIN  --  0.3 0.3   GLUCOSE 207* 148* 146*     Recent Labs     11/12/21 2153 11/13/21 0400 11/13/21  1050 11/14/21  0305   WBC 66.5* 58.8*  --  35.2*   NEUTSPOLYS 79.50* 75.90*  --  84.20*   LYMPHOCYTES 4.10* 0.90*  --  3.50*   MONOCYTES 3.30 0.90  --  1.80   EOSINOPHILS 0.00 0.00  --  0.00   BASOPHILS 0.00 0.80  --  0.00   ASTSGOT  --   --  16 13   ALTSGPT  --   --  21 17   ALKPHOSPHAT  --   --  185* 144*   TBILIRUBIN  --   --  0.3 0.3     Recent Labs     11/12/21  1649 11/12/21  2153 11/12/21  2153 11/13/21  0400 11/13/21  1735 11/14/21  0305   RBC  --  4.06*  --  3.81*  --  3.18*   HEMOGLOBIN  --  12.3   < > 11.9* 10.0* 9.6*   HEMATOCRIT  --  38.5    < > 35.7* 30.0* 29.3*   PLATELETCT  --  617*  --  640*  --  456*   PROTHROMBTM 14.7*  --   --   --   --   --    APTT 102.3*  --   --   --   --   --    INR 1.19*  --   --   --   --   --     < > = values in this interval not displayed.       Imaging  X-Ray:  I have personally reviewed the images and compared with prior images. and My impression is: Persistent moderate size right effusion with right lower lobe consolidation/atelectasis, endotracheal tube is deep and needs to be pulled back by 2 cm, gastric tube and right IJ central venous catheter are in good position    Assessment/Plan  * ST elevation myocardial infarction involving left anterior descending (LAD) coronary artery (HCC)- (present on admission)  Assessment & Plan  Status post ASA/TNK at [place] 11/12  Cardiology consulted, coronary angiography/PCI on hold given resolution of STEMI  Continue heparin drip, antiplatelets  Eventual beta-blocker once shock resolved    Acute left-sided weakness  Assessment & Plan  Felt to be secondary to carotid stenosis/ischemia  Neurology consulted  Follow-up on CT scans formal reads (discussed with radiology)  Continue antiplatelet, statin  Blood pressure goal SBP greater than 140  PT/OT/SLP eval  MRI brain pending again today    Pneumonia due to infectious organism  Assessment & Plan  Continue Rocephin and doxycycline  Trend procalcitonin  Follow-up on outside hospital as well as [facility] cultures  Parapneumonic effusion --> diagnostic and therapeutic thoracentesis today with pleural fluid for culture and Gram stain/cytology    Acute respiratory failure with hypoxia and hypercapnia (HCC)  Assessment & Plan  Intubated 11/12  Continue full mechanical ventilatory support, decrease respiratory to 18  Pullback endotracheal tube by 2 cm  Titrate FiO2 to goal SPO2 greater than 88%  RT/O2 protocol  Eventual diuresis  Limit sedatives with close neurologic monitoring (titrate dexmedetomidine drip)  ABCDEF bundle    Shock  ([facility])  Assessment & Plan  Undifferentiated, EF 75% -likely septic shock at this point  Continue to titrate norepinephrine and vasopressin drips to maintain mean arterial pressure greater than 65  Monitor CVP with goal 10-12  Continue stress dose steroids given home steroid use for relative adrenal insufficiency  Continue empiric antibiotics    Type 2 diabetes mellitus with hyperglycemia, without long-term current use of insulin ([facility])  Assessment & Plan  Continue insulin sliding scale with goal glucose between 120 and 180  Continue enteral nutrition and monitor    Acute renal failure with tubular necrosis ([facility])  Assessment & Plan  Unchanged, ATN, nonoliguric  Avoid nephrotoxins  Monitor creatinine, urine output, electrolytes closely    Altered level of consciousness  Assessment & Plan  Likely multifactorial - improving  Limit sedatives with close neurologic monitoring    COPD (chronic obstructive pulmonary disease) ([facility])  Assessment & Plan  Without acute exacerbation  RT/O2 protocol  As needed bronchodilators    Dyslipidemia  Assessment & Plan  Continue high intensity statin    Primary hypertension- (present on admission)  Assessment & Plan  Remains in shock  Hold scheduled antihypertensives and monitor    Hyponatremia  Assessment & Plan  Monitor    Thrombocytosis  Assessment & Plan  Likely reactive, monitor with treatment - improving    Prolonged Q-T interval on ECG  Assessment & Plan  Avoid QT prolonging medications  Optimize electrolytes, continuous telemetry monitoring    Leukocytosis  Assessment & Plan  Marked elevated WBC, question leukemoid reaction, ?  Malignancy -improving  Monitor with antibiotics       VTE:  Heparin  Ulcer: H2 Antagonist  Lines: Central Line  Ongoing indication addressed, Arterial Line  Ongoing indication addressed and Barry Catheter  Ongoing indication addressed    I have performed a physical exam and reviewed and updated ROS and Plan today (11/14/2021). In review of yesterday's note  (11/13/2021), there are no changes except as documented above.     Patient remains critically ill today requiring active management of her mechanical ventilatory support as well as titration of sedative and vasopressin drips. High risk of deterioration and worsening vital organ dysfunction and death without the above critical care interventions.    Discussed patient condition and risk of morbidity and/or mortality with Family, RN, RT, Pharmacy, Charge nurse / hot rounds, Patient and cardiology.  Critical care time = 92 minutes in directly providing and coordinating critical care and extensive data review.  No time overlap and excludes procedures.

## 2021-11-14 NOTE — PROGRESS NOTES
[facility] Cardiology Follow-up Consult Note  Date of note:    11/14/2021          Patient ID:  Name:   Stella Prieto     YOB: 1955  Age:   66 y.o.  female   MRN:   [id number]    Chief Complaint   Patient presents with   • Chest Pain     Patient transfered from [facility] for a STEMI. Patient was given TNK, Heparin, Nitro, ASA, PTA. Patient arrived on heparin drip. Patient also received Rocephin for PNA diagnosis       Interim Events:  Plan for extubation and was tapped for her pleural fluid today seen with her other daughter who is visiting from [place] of state    Reported to have left weakness      ROS  Unable to obtain sedated    Past medical, surgical, social, and family history reviewed and unchanged from admission except as noted in assessment and plan.    Medications: Reviewed in MAR  Current Facility-Administered Medications   Medication Dose Frequency Provider Last Rate Last Admin   • Pharmacy Consult: Enteral tube insertion - review meds/change route/product selection  1 Each PHARMACY TO DOSE Jeremy M Gonda, M.D.       • doxycycline monohydrate (ADOXA) tablet 100 mg  100 mg Q12HRS Jeremy M Gonda, M.D.       • acetaminophen (Tylenol) tablet 650 mg  650 mg Q4HRS PRN Jeremy M Gonda, M.D.   650 mg at 11/14/21 0650   • hydrocortisone sodium succinate PF (Solu-CORTEF) 100 MG injection 50 mg  50 mg Q6HRS Jeremy M Gonda, M.D.   50 mg at 11/14/21 1206   • insulin regular (HumuLIN R,NovoLIN R) injection  2-9 Units Q6HRS Jeremy M Gonda, M.D.        And   • dextrose 50% (D50W) injection 50 mL  50 mL Q15 MIN PRN Jeremy M Gonda, M.D.       • aspirin (ASA) chewable tab 81 mg  81 mg DAILY Jeremy M Gonda, M.D.   81 mg at 11/14/21 0552   • atorvastatin (LIPITOR) tablet 40 mg  40 mg Q EVENING Jeremy M Gonda, M.D.   40 mg at 11/13/21 1718   • famotidine (PEPCID) tablet 20 mg  20 mg DAILY Jeremy M Gonda, M.D.   20 mg at 11/14/21 0552    Or   • famotidine (PEPCID) injection 20 mg  20 mg DAILY Jeremy M Gonda, M.D.       •  buPROPion (WELLBUTRIN) tablet 150 mg  150 mg QAM Jeremy M Gonda, M.D.   150 mg at 11/14/21 0552   • escitalopram (Lexapro) tablet 10 mg  10 mg Q EVENING Jeremy M Gonda, M.D.   10 mg at 11/13/21 1718   • topiramate (TOPAMAX) tablet 75 mg  75 mg Q EVENING Jeremy M Gonda, M.D.   75 mg at 11/13/21 1717   • heparin infusion 25,000 units in 500 mL 0.45% NACL  0-30 Units/kg/hr (Adjusted) Continuous Isaiah Beach M.D. 17.8 mL/hr at 11/14/21 1205 14 Units/kg/hr at 11/14/21 1205   • heparin injection 1,900 Units  30 Units/kg (Adjusted) PRN Isaiah Beach M.D.   1,900 Units at 11/13/21 0050   • fentaNYL (SUBLIMAZE) injection 50 mcg  50 mcg Q15 MIN PRN Jeremy M Gonda, M.D.        And   • fentaNYL (SUBLIMAZE) injection 100 mcg  100 mcg Q15 MIN PRN Jeremy M Gonda, M.D.   100 mcg at 11/14/21 1118    And   • fentaNYL (SUBLIMAZE) 50 mcg/mL in 50mL (Continuous Infusion)   Continuous Jeremy M Gonda, M.D.   Stopped at 11/14/21 1331    And   • dexmedetomidine (PRECEDEX) 400 mcg/100mL NS premix infusion  0.1-1.5 mcg/kg/hr Continuous Jeremy M Gonda, M.D.   Stopped at 11/14/21 1332   • norepinephrine (Levophed) 8 mg in 250 mL NS infusion (premix)  0-30 mcg/min Continuous Jeremy M Gonda, M.D.   Stopped at 11/14/21 1257   • senna-docusate (PERICOLACE or SENOKOT S) 8.6-50 MG per tablet 2 Tablet  2 Tablet BID Amrit Bowles M.D.   2 Tablet at 11/14/21 0552    And   • polyethylene glycol/lytes (MIRALAX) PACKET 1 Packet  1 Packet QDAY PRN Amrit Bowles M.D.        And   • magnesium hydroxide (MILK OF MAGNESIA) suspension 30 mL  30 mL QDAY PRN Amrit Bowles M.D.        And   • bisacodyl (DULCOLAX) suppository 10 mg  10 mg QDAY PRN Amrit Bowles M.D.       • MD Alert...ICU Electrolyte Replacement per Pharmacy   PHARMACY TO DOSE Amrit Bowles M.D.       • lidocaine (XYLOCAINE) 1 % injection 2 mL  2 mL Q30 MIN PRN Amrit Bowles M.D.       • Respiratory Therapy Consult   Continuous RT Amrit Bowles,  "M.D.       • ipratropium-albuterol (DUONEB) nebulizer solution  3 mL Q2HRS PRN (RT) Amrit Bowles M.D.   3 mL at 11/12/21 2313   • cefTRIAXone (Rocephin) 1 g in  mL IVPB  1 g Q24HRS Amrit Bowles M.D.   Stopped at 11/14/21 0622   • vasopressin (VASOSTRICT) 20 Units in  mL Infusion  0.03 Units/min Continuous Amrit Bowles M.D. 9 mL/hr at 11/14/21 0823 0.03 Units/min at 11/14/21 0823   Last reviewed on 11/12/2021  7:24 PM by Bruno De Luna    No Known Allergies    Physical Exam  Body mass index is 32.26 kg/m². /67   Pulse 69   Temp 37.5 °C (99.5 °F) (Bladder)   Resp 19   Ht 1.6 m (5' 3\")   Wt 82.6 kg (182 lb 1.6 oz)   SpO2 91%    Vitals:    11/14/21 1334 11/14/21 1345 11/14/21 1400 11/14/21 1515   BP:       Pulse: (!) 53 (!) (P) 54 (!) (P) 52 69   Resp: 18 (!) (P) 26 (!) (P) 26 19   Temp:       TempSrc:       SpO2: 95% (P) 94% (P) 94% 91%   Weight:       Height:        Oxygen Therapy:  Pulse Oximetry: 91 %, O2 (LPM): 6, FiO2%: 30 %, O2 Delivery Device: Nasal Cannula    General: RASS -3 sedated  Eyes: nl conjunctiva  ENT: ET tube no blood  Neck: Central line clean dry intact  Lungs: Vent sounds  Heart: RRR  EXT no edema bilateral lower extremities. + pedal pulses. no cyanosis  Abdomen: soft, non tender, non distended,  Neurological: No focal deficits  Psychiatric: Appropriate affect,   Skin: Warm extremities    Labs (personally reviewed and notable for):   Recent Results (from the past 24 hour(s))   HEMOGLOBIN AND HEMATOCRIT    Collection Time: 11/13/21  5:35 PM   Result Value Ref Range    Hemoglobin 10.0 (L) 12.0 - 16.0 g/dL    Hematocrit 30.0 (L) 37.0 - 47.0 %   POCT glucose device results    Collection Time: 11/13/21  5:35 PM   Result Value Ref Range    Glucose - Accu-Ck 140 (H) 65 - 99 mg/dL   Heparin Anti-Xa    Collection Time: 11/13/21 11:33 PM   Result Value Ref Range    Heparin Xa (UFH) 0.23 IU/mL   POCT glucose device results    Collection Time: 11/13/21 11:37 PM "   Result Value Ref Range    Glucose - Accu-Ck 147 (H) 65 - 99 mg/dL   CBC with Differential    Collection Time: 11/14/21  3:05 AM   Result Value Ref Range    WBC 35.2 (HH) 4.8 - 10.8 K/uL    RBC 3.18 (L) 4.20 - 5.40 M/uL    Hemoglobin 9.6 (L) 12.0 - 16.0 g/dL    Hematocrit 29.3 (L) 37.0 - 47.0 %    MCV 92.1 81.4 - 97.8 fL    MCH 30.2 27.0 - 33.0 pg    MCHC 32.8 (L) 33.6 - 35.0 g/dL    RDW 48.8 35.9 - 50.0 fL    Platelet Count 456 (H) 164 - 446 K/uL    MPV 9.9 9.0 - 12.9 fL    Neutrophils-Polys 84.20 (H) 44.00 - 72.00 %    Lymphocytes 3.50 (L) 22.00 - 41.00 %    Monocytes 1.80 0.00 - 13.40 %    Eosinophils 0.00 0.00 - 6.90 %    Basophils 0.00 0.00 - 1.80 %    Nucleated RBC 0.10 /100 WBC    Neutrophils (Absolute) 30.55 (H) 2.00 - 7.15 K/uL    Lymphs (Absolute) 1.23 1.00 - 4.80 K/uL    Monos (Absolute) 0.63 0.00 - 0.85 K/uL    Eos (Absolute) 0.00 0.00 - 0.51 K/uL    Baso (Absolute) 0.00 0.00 - 0.12 K/uL    NRBC (Absolute) 0.03 K/uL   Magnesium    Collection Time: 11/14/21  3:05 AM   Result Value Ref Range    Magnesium 2.4 1.5 - 2.5 mg/dL   Phosphorus    Collection Time: 11/14/21  3:05 AM   Result Value Ref Range    Phosphorus 3.8 2.5 - 4.5 mg/dL   Comp Metabolic Panel    Collection Time: 11/14/21  3:05 AM   Result Value Ref Range    Sodium 136 135 - 145 mmol/L    Potassium 4.2 3.6 - 5.5 mmol/L    Chloride 100 96 - 112 mmol/L    Co2 23 20 - 33 mmol/L    Anion Gap 13.0 7.0 - 16.0    Glucose 146 (H) 65 - 99 mg/dL    Bun 58 (H) 8 - 22 mg/dL    Creatinine 1.27 0.50 - 1.40 mg/dL    Calcium 8.4 (L) 8.5 - 10.5 mg/dL    AST(SGOT) 13 12 - 45 U/L    ALT(SGPT) 17 2 - 50 U/L    Alkaline Phosphatase 144 (H) 30 - 99 U/L    Total Bilirubin 0.3 0.1 - 1.5 mg/dL    Albumin 2.6 (L) 3.2 - 4.9 g/dL    Total Protein 5.7 (L) 6.0 - 8.2 g/dL    Globulin 3.1 1.9 - 3.5 g/dL    A-G Ratio 0.8 g/dL   DIFFERENTIAL MANUAL    Collection Time: 11/14/21  3:05 AM   Result Value Ref Range    Bands-Stabs 2.60 0.00 - 10.00 %    Metamyelocytes 3.50 %     Myelocytes 3.50 %    Progranulocytes 0.90 %    Manual Diff Status PERFORMED    PERIPHERAL SMEAR REVIEW    Collection Time: 21  3:05 AM   Result Value Ref Range    Peripheral Smear Review see below    PLATELET ESTIMATE    Collection Time: 21  3:05 AM   Result Value Ref Range    Plt Estimation Increased    MORPHOLOGY    Collection Time: 21  3:05 AM   Result Value Ref Range    RBC Morphology Normal    ESTIMATED GFR    Collection Time: 21  3:05 AM   Result Value Ref Range    GFR If  51 (A) >60 mL/min/1.73 m 2    GFR If Non  42 (A) >60 mL/min/1.73 m 2   POCT arterial blood gas device results    Collection Time: 21  4:44 AM   Result Value Ref Range    Ph 7.443 7.400 - 7.500    Pco2 32.5 26.0 - 37.0 mmHg    Po2 86 64 - 87 mmHg    Tco2 23 20 - 33 mmol/L    S02 97 93 - 99 %    Hco3 22.2 17.0 - 25.0 mmol/L    BE -1 -4 - 3 mmol/L    Body Temp 37.9 C degrees    O2 Therapy 40 %    iPF Ratio 215     Ph Temp Samuel 7.429 7.400 - 7.500    Pco2 Temp Co 33.9 26.0 - 37.0 mmHg    Po2 Temp Cor 92 (H) 64 - 87 mmHg    Specimen Arterial     DelSys Vent     End Tidal Carbon Dioxide 26 mmhg    Tidal Volume 380 mL    Peep End Expiratory Pressure 8 cmh20    Set Rate 26     Mode APV-CMV    POCT glucose device results    Collection Time: 21  6:28 AM   Result Value Ref Range    Glucose - Accu-Ck 146 (H) 65 - 99 mg/dL   Heparin Anti-Xa    Collection Time: 21  6:30 AM   Result Value Ref Range    Heparin Xa (UFH) 0.41 IU/mL   Fluid Glucose (pleural fluid)    Collection Time: 21  7:24 AM   Result Value Ref Range    Fluid Type Pleural     Glucose, Fluid 61 mg/dL   EKG    Collection Time: 21  8:26 AM   Result Value Ref Range    Report       [facility]    Test Date:  2021  Pt Name:    QUINN RAMIREZ                  Department: 161  MRN:        [id number]                      Room:       [facility]  Gender:     Female                       Technician: LIBORIO  :         [id number]                   Requested By:YURY VIVAR  Order #:    [id number]                    Reading MD:    Measurements  Intervals                                Axis  Rate:       53                           P:          77  RI:         160                          QRS:        33  QRSD:       86                           T:          55  QT:         508  QTc:        477    Interpretive Statements  SINUS BRADYCARDIA  LOW VOLTAGE THROUGHOUT  Compared to ECG 11/13/2021 08:41:19  Sinus rhythm no longer present  T-wave abnormality no longer present  ST (T wave) deviation no longer present  Prolonged QT interval no longer present     Fluid pH (pleural fluid)    Collection Time: 11/14/21 11:00 AM   Result Value Ref Range    Fluid Type Pleural     Ph Misc 7.00    Fluid Cell Count (pleural fluid)    Collection Time: 11/14/21 11:00 AM   Result Value Ref Range    Fluid Type Pleural     Color-Body Fluid Red     Character-Body Fluid Bloody     Total RBC Count 610046 cells/uL    Total WBC 29238 cells/uL    Polys 91 %    Lymphs 1 %    Mononuclear Cells - Fluid 6 %    Eosinophils - CSF 2 %    Comments see below    Fluid Total Protein (pleural fluid)    Collection Time: 11/14/21 11:00 AM   Result Value Ref Range    Total Protein Fluid 3.9 g/dL   Fluid LDH (pleural fluid)    Collection Time: 11/14/21 11:00 AM   Result Value Ref Range    Body Fluid  U/L   POCT glucose device results    Collection Time: 11/14/21 11:51 AM   Result Value Ref Range    Glucose - Accu-Ck 128 (H) 65 - 99 mg/dL       Cardiac Imaging and Procedures Review:    EKG and telemetry tracings personally reviewed    Impression and Medical Decision Making:  Principal Problem:    ST elevation myocardial infarction involving left anterior descending (LAD) coronary artery (HCC) POA: Yes  Active Problems:    Primary hypertension (Chronic) POA: Yes    Dyslipidemia (Chronic) POA: Unknown    Shock (HCC) POA: Unknown    COPD (chronic obstructive pulmonary  disease) (HCC) POA: Unknown    Acute respiratory failure with hypoxia and hypercapnia (HCC) POA: Unknown    Altered level of consciousness POA: Unknown    Pneumonia due to infectious organism POA: Unknown    Leukocytosis POA: Unknown    Prolonged Q-T interval on ECG POA: Unknown    Acute left-sided weakness POA: Unknown    Acute renal failure with tubular necrosis (HCC) POA: Unknown    Thrombocytosis POA: Unknown    Type 2 diabetes mellitus with hyperglycemia, without long-term current use of insulin (HCC) POA: Unknown    Hyponatremia POA: Unknown  Resolved Problems:    * No resolved hospital problems. *      STEMI -not clear if due to LAD occlusion or stress cardiomyopathy  Post lytics  EKG remains normal  Plan on extubation neurologic evaluation and if stable left heart catheterization tomorrow or early in the week  Reasonable to continue heparin  Repeat echocardiogram tomorrow a.m.    Shock  Still on pressors partially to ensure adequate CNS perfusion  Etiology is still not entirely clear    Concern for stroke  Reevaluation with neuro imaging  Consider vascular surgery evaluation depending on clinical course for right carotid stenosis    I personally discussed her case with  Dr Jeremy M Gonda, M.D.    No future appointments.    It is my pleasure to participate in the care of Ms. Prieto.  Please do not hesitate to contact me with questions or concerns.    Isaiah Beach MD PhD [facility]  Cardiologist [facility]    11/14/2021    Please note that this dictation was created using voice recognition software. There may be errors of grammar and possibly content I did not discover before finalizing the note.     Stella Prieto is critically ill and she is at high risk for clinical deterioration, worsening vital organ dysfunction, and death without the above critical care interventions.  Critical care time 40 minutes due to shock and STEMI. No time overlap. Procedures were not included in  this time. This time was spent at the bedside evaluating the patient's chart, their labs,   imaging, physical exam and discussion with Dr. Gonda as well as the bedside nurse, the patient's daughter, and formulating an assessment and plan.

## 2021-11-14 NOTE — PROCEDURES
Procedure Note    Date: 11/14/2021  Time: 1045    Procedure: Diagnostic and therapeutic thoracentesis  Site: R chest (fluid localized with US guidance in realtime)    Indication: Moderate-large pleural effusion, respiratory failure, PNA  Consent: Informed consent obtained from patient or designated decision maker after explaining the benefits/risks of the procedure including but not limited to bleeding, infection, nerve or other deep structure injury, pneumothorax/hemothorax, arrythmia, or death. Patient or surrogate expressed understanding and agreement.    Time-out: Written consent was obtained. Immediately prior to procedure, a time out was called to verify the correct patient, procedure, equipment, support staff and site/side marked as required. Pre-procedure pain reported as 2/10 and post-procedure was 2/10.    Procedure: After obtaining consent, a time-out was performed. Appropriate site confirmed with ultrasound and patient positioned, prepped, and draped in sterile fashion. All those present wearing mask/cap and those physically participating remained adhering to sterile fashion with mask/cap and gloves. 5 mL of local anesthetic injected (1% lidocaine without epinephrine) achieving appropriate comfort level for patient. Large pleural fluid pocket localized and accessed using continuous ultrasound guidance and a thoracentesis catheter placed using Seldinger technique into the pleural cavity without complication. Able to easily aspirate bloody fluid fluid. Fluid collected for analysis and 900 mL of fluid drained into pleurovac bottles. Significant reduction in fluid collection and re-expansion of the lung seen on US. Catheter removed and site dressed with bandage.  Patient tolerated procedure well without any difficulties and remains in care of bedside nurse. CXR will be performed to confirm improvement in effusion and to evaluate for pneumothorax. Samples sent to lab.    EBL: minimal  Complications:  none  CXR: pending    Jeremy Gonda, MD  Critical Care Medicine

## 2021-11-14 NOTE — PROGRESS NOTES
Dr Gonda notified of decrease in urine output over the last few hours. New orders received for 500mL bolus of LR, if CVP remains <10 may give second 500mL bolus.

## 2021-11-15 ENCOUNTER — APPOINTMENT (OUTPATIENT)
Dept: CARDIOLOGY | Facility: MEDICAL CENTER | Age: 66
DRG: 280 | End: 2021-11-15
Attending: INTERNAL MEDICINE
Payer: MEDICARE

## 2021-11-15 ENCOUNTER — APPOINTMENT (OUTPATIENT)
Dept: RADIOLOGY | Facility: MEDICAL CENTER | Age: 66
DRG: 280 | End: 2021-11-15
Attending: INTERNAL MEDICINE
Payer: MEDICARE

## 2021-11-15 PROBLEM — E87.1 HYPONATREMIA: Status: RESOLVED | Noted: 2021-11-13 | Resolved: 2021-11-15

## 2021-11-15 PROBLEM — J90 PLEURAL EFFUSION: Status: ACTIVE | Noted: 2021-11-15

## 2021-11-15 LAB
ALBUMIN SERPL BCP-MCNC: 2.7 G/DL (ref 3.2–4.9)
ALBUMIN/GLOB SERPL: 0.8 G/DL
ALP SERPL-CCNC: 130 U/L (ref 30–99)
ALT SERPL-CCNC: 15 U/L (ref 2–50)
ANION GAP SERPL CALC-SCNC: 9 MMOL/L (ref 7–16)
ANISOCYTOSIS BLD QL SMEAR: ABNORMAL
AST SERPL-CCNC: 13 U/L (ref 12–45)
BASOPHILS # BLD AUTO: 0 % (ref 0–1.8)
BASOPHILS # BLD: 0 K/UL (ref 0–0.12)
BILIRUB SERPL-MCNC: 0.2 MG/DL (ref 0.1–1.5)
BUN SERPL-MCNC: 51 MG/DL (ref 8–22)
CALCIUM SERPL-MCNC: 8.6 MG/DL (ref 8.5–10.5)
CHLORIDE SERPL-SCNC: 105 MMOL/L (ref 96–112)
CO2 SERPL-SCNC: 26 MMOL/L (ref 20–33)
CREAT SERPL-MCNC: 0.9 MG/DL (ref 0.5–1.4)
CRP SERPL HS-MCNC: 4.8 MG/DL (ref 0–0.75)
CRP SERPL HS-MCNC: 6.59 MG/DL (ref 0–0.75)
EOSINOPHIL # BLD AUTO: 0 K/UL (ref 0–0.51)
EOSINOPHIL NFR BLD: 0 % (ref 0–6.9)
ERYTHROCYTE [DISTWIDTH] IN BLOOD BY AUTOMATED COUNT: 51.4 FL (ref 35.9–50)
GLOBULIN SER CALC-MCNC: 3.3 G/DL (ref 1.9–3.5)
GLUCOSE BLD-MCNC: 100 MG/DL (ref 65–99)
GLUCOSE BLD-MCNC: 85 MG/DL (ref 65–99)
GLUCOSE BLD-MCNC: 92 MG/DL (ref 65–99)
GLUCOSE BLD-MCNC: 98 MG/DL (ref 65–99)
GLUCOSE SERPL-MCNC: 104 MG/DL (ref 65–99)
HCT VFR BLD AUTO: 30 % (ref 37–47)
HGB BLD-MCNC: 9.3 G/DL (ref 12–16)
INR PPP: 1.23 (ref 0.87–1.13)
LV EJECT FRACT  99904: 70
LV EJECT FRACT MOD 2C 99903: 68.13
LV EJECT FRACT MOD 4C 99902: 74.7
LV EJECT FRACT MOD BP 99901: 71.05
LYMPHOCYTES # BLD AUTO: 1.29 K/UL (ref 1–4.8)
LYMPHOCYTES NFR BLD: 4.4 % (ref 22–41)
MAGNESIUM SERPL-MCNC: 2.5 MG/DL (ref 1.5–2.5)
MANUAL DIFF BLD: NORMAL
MCH RBC QN AUTO: 30.1 PG (ref 27–33)
MCHC RBC AUTO-ENTMCNC: 31 G/DL (ref 33.6–35)
MCV RBC AUTO: 97.1 FL (ref 81.4–97.8)
METAMYELOCYTES NFR BLD MANUAL: 5.3 %
MICROCYTES BLD QL SMEAR: ABNORMAL
MONOCYTES # BLD AUTO: 1.03 K/UL (ref 0–0.85)
MONOCYTES NFR BLD AUTO: 3.5 % (ref 0–13.4)
MORPHOLOGY BLD-IMP: NORMAL
MYELOCYTES NFR BLD MANUAL: 5.3 %
NEUTROPHILS # BLD AUTO: 23.96 K/UL (ref 2–7.15)
NEUTROPHILS NFR BLD: 78.9 % (ref 44–72)
NEUTS BAND NFR BLD MANUAL: 2.6 % (ref 0–10)
NRBC # BLD AUTO: 0.02 K/UL
NRBC BLD-RTO: 0.1 /100 WBC
PATH REV: NORMAL
PATH REV: NORMAL
PHOSPHATE SERPL-MCNC: 4.5 MG/DL (ref 2.5–4.5)
PLATELET # BLD AUTO: 383 K/UL (ref 164–446)
PLATELET BLD QL SMEAR: NORMAL
PMV BLD AUTO: 9.8 FL (ref 9–12.9)
POTASSIUM SERPL-SCNC: 4.3 MMOL/L (ref 3.6–5.5)
PREALB SERPL-MCNC: 13.3 MG/DL (ref 18–38)
PREALB SERPL-MCNC: 13.9 MG/DL (ref 18–38)
PROT SERPL-MCNC: 6 G/DL (ref 6–8.2)
PROTHROMBIN TIME: 15.2 SEC (ref 12–14.6)
RBC # BLD AUTO: 3.09 M/UL (ref 4.2–5.4)
RBC BLD AUTO: PRESENT
SODIUM SERPL-SCNC: 140 MMOL/L (ref 135–145)
UFH PPP CHRO-ACNC: 0.42 IU/ML
UFH PPP CHRO-ACNC: 0.51 IU/ML
WBC # BLD AUTO: 29.4 K/UL (ref 4.8–10.8)

## 2021-11-15 PROCEDURE — 99232 SBSQ HOSP IP/OBS MODERATE 35: CPT | Performed by: INTERNAL MEDICINE

## 2021-11-15 PROCEDURE — 83735 ASSAY OF MAGNESIUM: CPT

## 2021-11-15 PROCEDURE — 700102 HCHG RX REV CODE 250 W/ 637 OVERRIDE(OP)

## 2021-11-15 PROCEDURE — 93458 L HRT ARTERY/VENTRICLE ANGIO: CPT

## 2021-11-15 PROCEDURE — 99233 SBSQ HOSP IP/OBS HIGH 50: CPT | Performed by: INTERNAL MEDICINE

## 2021-11-15 PROCEDURE — 80053 COMPREHEN METABOLIC PANEL: CPT

## 2021-11-15 PROCEDURE — 93458 L HRT ARTERY/VENTRICLE ANGIO: CPT | Mod: 26 | Performed by: INTERNAL MEDICINE

## 2021-11-15 PROCEDURE — A9270 NON-COVERED ITEM OR SERVICE: HCPCS | Performed by: INTERNAL MEDICINE

## 2021-11-15 PROCEDURE — 84100 ASSAY OF PHOSPHORUS: CPT

## 2021-11-15 PROCEDURE — A9270 NON-COVERED ITEM OR SERVICE: HCPCS

## 2021-11-15 PROCEDURE — 85610 PROTHROMBIN TIME: CPT

## 2021-11-15 PROCEDURE — 94664 DEMO&/EVAL PT USE INHALER: CPT

## 2021-11-15 PROCEDURE — A9270 NON-COVERED ITEM OR SERVICE: HCPCS | Performed by: NURSE PRACTITIONER

## 2021-11-15 PROCEDURE — 70551 MRI BRAIN STEM W/O DYE: CPT | Mod: MG

## 2021-11-15 PROCEDURE — 4A023N7 MEASUREMENT OF CARDIAC SAMPLING AND PRESSURE, LEFT HEART, PERCUTANEOUS APPROACH: ICD-10-PCS | Performed by: INTERNAL MEDICINE

## 2021-11-15 PROCEDURE — 700102 HCHG RX REV CODE 250 W/ 637 OVERRIDE(OP): Performed by: INTERNAL MEDICINE

## 2021-11-15 PROCEDURE — 700102 HCHG RX REV CODE 250 W/ 637 OVERRIDE(OP): Performed by: HOSPITALIST

## 2021-11-15 PROCEDURE — 93880 EXTRACRANIAL BILAT STUDY: CPT | Mod: 26 | Performed by: INTERNAL MEDICINE

## 2021-11-15 PROCEDURE — 86140 C-REACTIVE PROTEIN: CPT | Mod: 91

## 2021-11-15 PROCEDURE — 84134 ASSAY OF PREALBUMIN: CPT | Mod: 91

## 2021-11-15 PROCEDURE — 85027 COMPLETE CBC AUTOMATED: CPT

## 2021-11-15 PROCEDURE — 85007 BL SMEAR W/DIFF WBC COUNT: CPT

## 2021-11-15 PROCEDURE — 700111 HCHG RX REV CODE 636 W/ 250 OVERRIDE (IP)

## 2021-11-15 PROCEDURE — 700102 HCHG RX REV CODE 250 W/ 637 OVERRIDE(OP): Performed by: NURSE PRACTITIONER

## 2021-11-15 PROCEDURE — 700111 HCHG RX REV CODE 636 W/ 250 OVERRIDE (IP): Performed by: INTERNAL MEDICINE

## 2021-11-15 PROCEDURE — 82962 GLUCOSE BLOOD TEST: CPT

## 2021-11-15 PROCEDURE — A9270 NON-COVERED ITEM OR SERVICE: HCPCS | Performed by: HOSPITALIST

## 2021-11-15 PROCEDURE — 99223 1ST HOSP IP/OBS HIGH 75: CPT | Performed by: HOSPITALIST

## 2021-11-15 PROCEDURE — 85520 HEPARIN ASSAY: CPT | Mod: 91

## 2021-11-15 PROCEDURE — 700111 HCHG RX REV CODE 636 W/ 250 OVERRIDE (IP): Performed by: HOSPITALIST

## 2021-11-15 PROCEDURE — 700101 HCHG RX REV CODE 250

## 2021-11-15 PROCEDURE — 99152 MOD SED SAME PHYS/QHP 5/>YRS: CPT | Performed by: INTERNAL MEDICINE

## 2021-11-15 PROCEDURE — 93306 TTE W/DOPPLER COMPLETE: CPT

## 2021-11-15 PROCEDURE — 99406 BEHAV CHNG SMOKING 3-10 MIN: CPT

## 2021-11-15 PROCEDURE — 770020 HCHG ROOM/CARE - TELE (206)

## 2021-11-15 PROCEDURE — 700117 HCHG RX CONTRAST REV CODE 255: Performed by: INTERNAL MEDICINE

## 2021-11-15 PROCEDURE — 94640 AIRWAY INHALATION TREATMENT: CPT

## 2021-11-15 PROCEDURE — 93306 TTE W/DOPPLER COMPLETE: CPT | Mod: 26 | Performed by: INTERNAL MEDICINE

## 2021-11-15 PROCEDURE — B2111ZZ FLUOROSCOPY OF MULTIPLE CORONARY ARTERIES USING LOW OSMOLAR CONTRAST: ICD-10-PCS | Performed by: INTERNAL MEDICINE

## 2021-11-15 PROCEDURE — 92610 EVALUATE SWALLOWING FUNCTION: CPT

## 2021-11-15 PROCEDURE — 700105 HCHG RX REV CODE 258: Performed by: INTERNAL MEDICINE

## 2021-11-15 RX ORDER — POLYETHYLENE GLYCOL 3350 17 G/17G
1 POWDER, FOR SOLUTION ORAL
Status: DISCONTINUED | OUTPATIENT
Start: 2021-11-15 | End: 2021-11-15

## 2021-11-15 RX ORDER — DOXYCYCLINE 100 MG/1
100 TABLET ORAL EVERY 12 HOURS
Status: DISCONTINUED | OUTPATIENT
Start: 2021-11-15 | End: 2021-11-17 | Stop reason: HOSPADM

## 2021-11-15 RX ORDER — MIDAZOLAM HYDROCHLORIDE 1 MG/ML
INJECTION INTRAMUSCULAR; INTRAVENOUS
Status: COMPLETED
Start: 2021-11-15 | End: 2021-11-15

## 2021-11-15 RX ORDER — AMOXICILLIN 250 MG
2 CAPSULE ORAL 2 TIMES DAILY
Status: DISCONTINUED | OUTPATIENT
Start: 2021-11-15 | End: 2021-11-15

## 2021-11-15 RX ORDER — AMOXICILLIN 250 MG
2 CAPSULE ORAL 2 TIMES DAILY
Status: DISCONTINUED | OUTPATIENT
Start: 2021-11-15 | End: 2021-11-17 | Stop reason: HOSPADM

## 2021-11-15 RX ORDER — ATORVASTATIN CALCIUM 40 MG/1
40 TABLET, FILM COATED ORAL EVERY EVENING
Status: DISCONTINUED | OUTPATIENT
Start: 2021-11-15 | End: 2021-11-17 | Stop reason: HOSPADM

## 2021-11-15 RX ORDER — ALBUTEROL SULFATE 90 UG/1
2 AEROSOL, METERED RESPIRATORY (INHALATION)
Status: DISCONTINUED | OUTPATIENT
Start: 2021-11-15 | End: 2021-11-16

## 2021-11-15 RX ORDER — HEPARIN SODIUM 1000 [USP'U]/ML
INJECTION, SOLUTION INTRAVENOUS; SUBCUTANEOUS
Status: COMPLETED
Start: 2021-11-15 | End: 2021-11-15

## 2021-11-15 RX ORDER — HEPARIN SODIUM 200 [USP'U]/100ML
INJECTION, SOLUTION INTRAVENOUS
Status: COMPLETED
Start: 2021-11-15 | End: 2021-11-15

## 2021-11-15 RX ORDER — BISACODYL 10 MG
10 SUPPOSITORY, RECTAL RECTAL
Status: DISCONTINUED | OUTPATIENT
Start: 2021-11-15 | End: 2021-11-15

## 2021-11-15 RX ORDER — BUPROPION HYDROCHLORIDE 75 MG/1
150 TABLET ORAL EVERY MORNING
Status: DISCONTINUED | OUTPATIENT
Start: 2021-11-15 | End: 2021-11-17 | Stop reason: HOSPADM

## 2021-11-15 RX ORDER — BISACODYL 10 MG
10 SUPPOSITORY, RECTAL RECTAL
Status: DISCONTINUED | OUTPATIENT
Start: 2021-11-15 | End: 2021-11-17 | Stop reason: HOSPADM

## 2021-11-15 RX ORDER — ASPIRIN 81 MG/1
81 TABLET, CHEWABLE ORAL DAILY
Status: DISCONTINUED | OUTPATIENT
Start: 2021-11-15 | End: 2021-11-17 | Stop reason: HOSPADM

## 2021-11-15 RX ORDER — ASPIRIN 81 MG/1
TABLET, CHEWABLE ORAL
Status: COMPLETED
Start: 2021-11-15 | End: 2021-11-15

## 2021-11-15 RX ORDER — ACETAMINOPHEN 325 MG/1
650 TABLET ORAL EVERY 4 HOURS PRN
Status: DISCONTINUED | OUTPATIENT
Start: 2021-11-15 | End: 2021-11-17 | Stop reason: HOSPADM

## 2021-11-15 RX ORDER — POLYETHYLENE GLYCOL 3350 17 G/17G
1 POWDER, FOR SOLUTION ORAL
Status: DISCONTINUED | OUTPATIENT
Start: 2021-11-15 | End: 2021-11-17 | Stop reason: HOSPADM

## 2021-11-15 RX ORDER — ESCITALOPRAM OXALATE 10 MG/1
10 TABLET ORAL EVERY EVENING
Status: DISCONTINUED | OUTPATIENT
Start: 2021-11-15 | End: 2021-11-17 | Stop reason: HOSPADM

## 2021-11-15 RX ORDER — LIDOCAINE HYDROCHLORIDE 20 MG/ML
INJECTION, SOLUTION INFILTRATION; PERINEURAL
Status: COMPLETED
Start: 2021-11-15 | End: 2021-11-15

## 2021-11-15 RX ORDER — VERAPAMIL HYDROCHLORIDE 2.5 MG/ML
INJECTION, SOLUTION INTRAVENOUS
Status: COMPLETED
Start: 2021-11-15 | End: 2021-11-15

## 2021-11-15 RX ORDER — TOPIRAMATE 25 MG/1
75 TABLET ORAL EVERY EVENING
Status: DISCONTINUED | OUTPATIENT
Start: 2021-11-15 | End: 2021-11-17 | Stop reason: HOSPADM

## 2021-11-15 RX ADMIN — METOPROLOL TARTRATE 12.5 MG: 25 TABLET, FILM COATED ORAL at 18:22

## 2021-11-15 RX ADMIN — FENTANYL CITRATE 50 MCG: 50 INJECTION, SOLUTION INTRAMUSCULAR; INTRAVENOUS at 13:37

## 2021-11-15 RX ADMIN — SENNOSIDES AND DOCUSATE SODIUM 2 TABLET: 50; 8.6 TABLET ORAL at 18:26

## 2021-11-15 RX ADMIN — CEFTRIAXONE SODIUM 1 G: 1 INJECTION, POWDER, FOR SOLUTION INTRAMUSCULAR; INTRAVENOUS at 05:45

## 2021-11-15 RX ADMIN — HYDROCORTISONE SODIUM SUCCINATE 50 MG: 100 INJECTION, POWDER, FOR SOLUTION INTRAMUSCULAR; INTRAVENOUS at 05:45

## 2021-11-15 RX ADMIN — ESCITALOPRAM OXALATE 10 MG: 10 TABLET ORAL at 14:39

## 2021-11-15 RX ADMIN — HEPARIN SODIUM: 1000 INJECTION, SOLUTION INTRAVENOUS; SUBCUTANEOUS at 13:39

## 2021-11-15 RX ADMIN — HEPARIN SODIUM 16 UNITS/KG/HR: 5000 INJECTION, SOLUTION INTRAVENOUS at 07:59

## 2021-11-15 RX ADMIN — LIDOCAINE HYDROCHLORIDE: 20 INJECTION, SOLUTION INFILTRATION; PERINEURAL at 13:35

## 2021-11-15 RX ADMIN — BUPROPION HYDROCHLORIDE 150 MG: 100 TABLET, FILM COATED ORAL at 14:40

## 2021-11-15 RX ADMIN — HEPARIN SODIUM 2000 UNITS: 200 INJECTION, SOLUTION INTRAVENOUS at 13:28

## 2021-11-15 RX ADMIN — ALBUTEROL SULFATE 2 PUFF: 90 AEROSOL, METERED RESPIRATORY (INHALATION) at 17:08

## 2021-11-15 RX ADMIN — ASPIRIN 81 MG CHEWABLE TABLET 81 MG: 81 TABLET CHEWABLE at 13:15

## 2021-11-15 RX ADMIN — VERAPAMIL HYDROCHLORIDE 2.5 MG: 2.5 INJECTION, SOLUTION INTRAVENOUS at 13:35

## 2021-11-15 RX ADMIN — HYDROCORTISONE SODIUM SUCCINATE 50 MG: 100 INJECTION, POWDER, FOR SOLUTION INTRAMUSCULAR; INTRAVENOUS at 22:35

## 2021-11-15 RX ADMIN — MIDAZOLAM HYDROCHLORIDE 1 MG: 1 INJECTION, SOLUTION INTRAMUSCULAR; INTRAVENOUS at 13:38

## 2021-11-15 RX ADMIN — IOHEXOL 35 ML: 350 INJECTION, SOLUTION INTRAVENOUS at 13:39

## 2021-11-15 RX ADMIN — HYDROCORTISONE SODIUM SUCCINATE 50 MG: 100 INJECTION, POWDER, FOR SOLUTION INTRAMUSCULAR; INTRAVENOUS at 14:38

## 2021-11-15 RX ADMIN — ATORVASTATIN CALCIUM 40 MG: 40 TABLET, FILM COATED ORAL at 18:18

## 2021-11-15 RX ADMIN — TOPIRAMATE 75 MG: 25 TABLET, FILM COATED ORAL at 18:17

## 2021-11-15 RX ADMIN — NITROGLYCERIN 10 ML: 20 INJECTION INTRAVENOUS at 13:35

## 2021-11-15 RX ADMIN — DOXYCYCLINE 100 MG: 100 TABLET, FILM COATED ORAL at 14:38

## 2021-11-15 RX ADMIN — ASPIRIN 81 MG: 81 TABLET, CHEWABLE ORAL at 14:39

## 2021-11-15 RX ADMIN — HYDROCORTISONE SODIUM SUCCINATE 50 MG: 100 INJECTION, POWDER, FOR SOLUTION INTRAMUSCULAR; INTRAVENOUS at 00:01

## 2021-11-15 RX ADMIN — DOXYCYCLINE 100 MG: 100 TABLET, FILM COATED ORAL at 18:18

## 2021-11-15 ASSESSMENT — ENCOUNTER SYMPTOMS
NERVOUS/ANXIOUS: 0
FOCAL WEAKNESS: 1
PALPITATIONS: 0
ABDOMINAL PAIN: 0
DIZZINESS: 0
SHORTNESS OF BREATH: 1
SENSORY CHANGE: 0
MYALGIAS: 0
DIARRHEA: 0
NAUSEA: 0
CHILLS: 0
BACK PAIN: 0
BRUISES/BLEEDS EASILY: 0
BLURRED VISION: 0
SORE THROAT: 0
HEADACHES: 0
ORTHOPNEA: 0
FEVER: 0
SPUTUM PRODUCTION: 0
COUGH: 1
DEPRESSION: 0
VOMITING: 0
SHORTNESS OF BREATH: 0
SPEECH CHANGE: 0
WEAKNESS: 1

## 2021-11-15 ASSESSMENT — PAIN DESCRIPTION - PAIN TYPE: TYPE: ACUTE PAIN

## 2021-11-15 NOTE — ASSESSMENT & PLAN NOTE
Acute on chronic    Secondary to pneumonia  Patient tolerated extubation on 11/14/2021  Wean off as tolerated she reports that she uses 2 L at baseline

## 2021-11-15 NOTE — CONSULTS
Hospital Medicine Consultation    Date of Service  11/15/2021    Referring Physician  Jeremy Gonda, M.D.    Consulting Physician  Tim Lutz M.D.    Reason for Consultation    Assume medical care    History of Presenting Illness  66 y.o. female who presented 11/12/2021 with history of COPD hypertension chronic respiratory failure with hypoxia on 2 L nasal cannula presented to [facility] with chest pain was noted to have ST elevation on her EKG and received TNKase.  She was started on heparin and transferred to our facility for cardiology evaluation.  She had an episode of left-sided weakness and confusion and concern for possible stroke.  She was evaluated by neurology.  CTA of the neck revealed carotid stenosis and it was felt that her symptoms were likely related to hypoperfusion as she was hypotensive in the setting of carotid stenosis.  Neurology recommended keeping  140 and proceeding with MRI of the brain when clinically stable.  The patient also had left lower lobe pneumonia she required intubation and was on pressors.  She had right ultrasound-guided thoracentesis on 11/14/2021 with 900 mL of fluid drained.  She tolerated extubation on 11/14/2021 and is currently on 3 L nasal cannula.  She denies any chest pain at the time of my examination.  She has a cough which is nonproductive.  She denies any nausea.  She denies any headache.  She has been weaned off pressors.  She is scheduled for cardiac catheterization and MRI later today.      Review of Systems  Review of Systems   Respiratory: Positive for cough and shortness of breath.    Cardiovascular: Negative for chest pain and orthopnea.   Gastrointestinal: Negative for nausea and vomiting.   Neurological: Positive for weakness.   All other systems reviewed and are negative.      Past Medical History   has a past medical history of COPD (chronic obstructive pulmonary disease) (HCC), Diabetes (HCC), Hypertension, ST elevation  myocardial infarction involving left anterior descending (LAD) coronary artery ([facility]) (11/12/2021), and ST elevation myocardial infarction involving left anterior descending (LAD) coronary artery ([facility]) (11/12/2021). She also has no past medical history of Stroke ([facility]).    Surgical History  Reviewed and not pertinent to the presenting problem    Family History  family history includes Heart Disease (age of onset: 64) in her father.    Social History   reports that she has quit smoking. She has never used smokeless tobacco. She reports current alcohol use.  No illicit drug use    Medications  Prior to Admission Medications   Prescriptions Last Dose Informant Patient Reported? Taking?   LORazepam (ATIVAN) 0.5 MG Tab unk at [facility] Patient's Home Pharmacy Yes Yes   Sig: Take 0.25 mg by mouth every evening as needed for Anxiety.   albuterol 108 (90 Base) MCG/ACT Aero Soln inhalation aerosol unk at [facility] Patient's Home Pharmacy Yes Yes   Sig: Inhale 2 Puffs every 6 hours as needed for Shortness of Breath.   buPROPion (WELLBUTRIN) 75 MG Tab unk at [facility] Patient's Home Pharmacy Yes Yes   Sig: Take 150 mg by mouth every morning.   escitalopram (LEXAPRO) 10 MG Tab unk at [facility] Patient's Home Pharmacy Yes Yes   Sig: Take 10 mg by mouth every evening.   ipratropium-albuterol (COMBIVENT RESPIMAT)  MCG/ACT Aero Soln unk at [facility] Patient's Home Pharmacy Yes Yes   Sig: Inhale 1 Puff 4 times a day.   predniSONE (DELTASONE) 20 MG Tab unk at [facility] Patient's Home Pharmacy Yes Yes   Sig: Take 20 mg by mouth every day.   tiotropium (SPIRIVA RESPIMAT) 2.5 mcg/Act Aero Soln unk at [facility] Patient's Home Pharmacy Yes Yes   Sig: Inhale 5 mcg every day.   topiramate (TOPAMAX) 25 MG Tab unk at [facility] Patient's Home Pharmacy Yes Yes   Sig: Take 75 mg by mouth every evening.   traZODone (DESYREL) 100 MG Tab unk at [facility] Patient's Home Pharmacy Yes Yes   Sig: Take 100 mg by mouth every evening as needed for Sleep.      Facility-Administered Medications: None        Allergies  No Known Allergies    Physical Exam  Pulse:  [] 83  Resp:  [14-31] 15  BP: ()/(58-88) 121/78  SpO2:  [86 %-98 %] 95 %    Physical Exam  Vitals and nursing note reviewed.   Constitutional:       General: She is not in acute distress.  HENT:      Head: Normocephalic and atraumatic.      Nose: Nose normal. No rhinorrhea.      Mouth/Throat:      Pharynx: No oropharyngeal exudate or posterior oropharyngeal erythema.   Eyes:      General: No scleral icterus.        Right eye: No discharge.         Left eye: No discharge.   Cardiovascular:      Rate and Rhythm: Normal rate and regular rhythm.      Heart sounds: Normal heart sounds. No murmur heard.  No friction rub. No gallop.    Pulmonary:      Effort: Pulmonary effort is normal. No respiratory distress.      Breath sounds: No stridor. Rhonchi present. No wheezing or rales.   Chest:      Chest wall: No tenderness.   Abdominal:      General: Bowel sounds are normal. There is no distension.      Palpations: Abdomen is soft. There is no mass.      Tenderness: There is no abdominal tenderness. There is no rebound.   Musculoskeletal:         General: No swelling or tenderness.      Cervical back: Neck supple. No rigidity.   Skin:     General: Skin is warm and dry.      Coloration: Skin is not cyanotic or jaundiced.      Nails: There is no clubbing.   Neurological:      Mental Status: She is alert and oriented to person, place, and time.      Cranial Nerves: No cranial nerve deficit.      Motor: Weakness (Left lower extremity 4/5) present.   Psychiatric:         Mood and Affect: Mood normal.         Behavior: Behavior normal.         Fluids      Laboratory  Recent Labs     11/13/21  0400 11/13/21  0400 11/13/21  1735 11/14/21  0305 11/15/21  0310   WBC 58.8*  --   --  35.2* 29.4*   RBC 3.81*  --   --  3.18* 3.09*   HEMOGLOBIN 11.9*   < > 10.0* 9.6* 9.3*   HEMATOCRIT 35.7*   < > 30.0* 29.3* 30.0*   MCV 93.7  --   --  92.1 97.1   MCH 31.2  --   --   30.2 30.1   MCHC 33.3*  --   --  32.8* 31.0*   RDW 49.1  --   --  48.8 51.4*   PLATELETCT 640*  --   --  456* 383   MPV 10.1  --   --  9.9 9.8    < > = values in this interval not displayed.     Recent Labs     11/13/21  1050 11/14/21  0305 11/15/21  0310   SODIUM 135 136 140   POTASSIUM 4.6 4.2 4.3   CHLORIDE 100 100 105   CO2 21 23 26   GLUCOSE 148* 146* 104*   BUN 51* 58* 51*   CREATININE 1.29 1.27 0.90   CALCIUM 7.9* 8.4* 8.6     Recent Labs     11/12/21  1649 11/15/21  0950   APTT 102.3*  --    INR 1.19* 1.23*                 Imaging  EC-ECHOCARDIOGRAM COMPLETE W/O CONT   Final Result      US-CAROTID DOPPLER BILAT   Final Result      DX-ABDOMEN FOR TUBE PLACEMENT   Final Result      Feeding tube is noted with tip at the level of the gastric fundus. NG tube is noted with tip at the level of the distal esophagus.      DX-CHEST-PORTABLE (1 VIEW)   Final Result         No pneumothorax identified after right thoracentesis.      DX-CHEST-PORTABLE (1 VIEW)   Final Result         1. No significant interval change.      DX-CHEST-PORTABLE (1 VIEW)   Final Result         1.  Right pulmonary infiltrates and layering right pleural effusion, similar compared to prior study.      EC-ECHOCARDIOGRAM LTD W/O CONT   Final Result      CT-CEREBRAL PERFUSION ANALYSIS   Final Result      1.  Cerebral blood flow less than 30% likely representing completed infarct = 0 mL.      2.  T Max more than 6 seconds likely representing combination of completed infarct and ischemia = 0 mL.      3.  Mismatched volume likely representing ischemic brain/penumbra = None      4.  Please note that the cerebral perfusion was performed on the limited brain tissue around the basal ganglia region. Infarct/ischemia outside the CT perfusion sections can be missed in this study.      CT-CTA NECK WITH & W/O-POST PROCESSING   Final Result      1.  Due to technical reasons the study was only presented for my review on 11/14/2021.   2.  Moderate focal stenosis in  the proximal RIGHT internal carotid artery.   3.  No dissection or occlusion of the cervical carotid or vertebral arteries.   4.  RIGHT pleural effusion.      CT-CTA HEAD WITH & W/O-POST PROCESS   Final Result      1.  Due to technical reasons the study was only presented for my review on 11/14/2021.   2.  No intracranial aneurysm, focal stenosis, or abrupt large vessel cut off.      DX-ABDOMEN FOR TUBE PLACEMENT   Final Result      NG tube tip projects over the stomach.      DX-CHEST-PORTABLE (1 VIEW)   Final Result      1.  No pneumothorax following right IJ central catheter placement. Well-positioned ETT and central line.   2.  Bibasilar atelectasis versus consolidation and associated small to moderate right pleural effusion.      EC-ECHOCARDIOGRAM LTD W/O CONT   Final Result      CT-HEAD W/O   Final Result    Limited examination.   1.  No acute intracranial abnormality is identified.   2.  Mild left frontal scalp swelling.      DX-CHEST-LIMITED (1 VIEW)   Final Result      1.  Small right pleural effusion with overlying atelectasis/consolidation.   2.  Segmental left lower lobe atelectasis.   3.  Mild pulmonary vascular congestion.   4.  Mild cardiomegaly.      CL-LEFT HEART CATHETERIZATION WITH POSSIBLE INTERVENTION    (Results Pending)   MR-BRAIN-W/O    (Results Pending)       Assessment/Plan  * ST elevation myocardial infarction involving left anterior descending (LAD) coronary artery (HCC)- (present on admission)  Assessment & Plan  Continue aspirin atorvastatin and metoprolol  Continue heparin drip  Cardiology following with plans for cardiac catheterization today    Pleural effusion  Assessment & Plan  Status post thoracentesis on 11/14/2021  Exudative effusion likely parapneumonic    Follow-up on cultures and cytology  Continue antibiotics for pneumonia        Type 2 diabetes mellitus with hyperglycemia, without long-term current use of insulin ([facility])  Assessment & Plan  Recent Labs     11/13/21  1151  11/13/21  1735 11/13/21  2337 11/14/21  0628 11/14/21  1151 11/14/21  1825 11/15/21  0004 11/15/21  0640   POCGLUCOSE 135* 140* 147* 146* 128* 99 98 92        Continue sliding-scale insulin monitor    Thrombocytosis  Assessment & Plan  Likely reactive monitor CBC    Acute renal failure with tubular necrosis (HCC)  Assessment & Plan  Resolved  Monitor renal function electrolytes and avoid nephrotoxic agents    Acute left-sided weakness  Assessment & Plan  Patient evaluated by neurology symptoms felt to be related to hypertension in the setting of moderate carotid stenosis  Continue aspirin and statin  Follow-up on MRI of brain  PT OT  She will likely need further evaluation for carotid stenosiswhen more  clinically stable    Leukocytosis  Assessment & Plan  Likely reactive secondary to severe pneumonia  WBC count trending down continue to monitor if remains significantly elevated after pneumonia is resolved she may need further work-up with bone marrow biopsy    Pneumonia due to infectious organism  Assessment & Plan  Continue ceftriaxone and doxycycline  Follow-up on cultures    Altered level of consciousness  Assessment & Plan  Avoid sedating agents  Frequent orientation  Clinically improved    Acute respiratory failure with hypoxia and hypercapnia (HCC)  Assessment & Plan  Acute on chronic    Secondary to pneumonia  Patient tolerated extubation on 11/14/2021  Wean off as tolerated she reports that she uses 2 L at baseline    COPD (chronic obstructive pulmonary disease) (HCC)  Assessment & Plan  Bronchodilators per RT protocol  Resume Spiriva  Patient on stress dose hydrocortisone wean off to home dose of prednisone    Shock (HCC)  Assessment & Plan  Resolved patient weaned off pressors    Dyslipidemia  Assessment & Plan  Continue atorvastatin    Primary hypertension- (present on admission)  Assessment & Plan  Stable on metoprolol  Goal -140 given carotid stenosis with intermittent left hemiparesis

## 2021-11-15 NOTE — THERAPY
"Speech Language Pathology   Clinical Swallow Evaluation     Patient Name: Stella Prieto  AGE:  66 y.o., SEX:  female  Medical Record #: [id number]  Today's Date: 11/15/2021     Precautions  Precautions: (P) Swallow Precautions ( See Comments)    Assessment    67 YO female admitted 11/12 2/2 STEMI, AMS and respiratory failure. PMHx:COPD, DM, HTN. CMHx: STEMI involving LAD coronary artery, acute left sided weakness, pneumonia due to infectious organism, acute respiraory fialure with yhypoxia and hypercapnia, shock, Dm2, acute renal failure with tubular necrosis, AMS, COPD, DLD, HTN. CXR 11/14 \"No pneumothorax identified after right thoracentesis.\" Head CT 11/12 \"No acute intracranial abnormality is identified.\" intubated 11/12-11/14.    Patient participated in clinical swallow evaluation this date.     Pertinent Information    Respiratory status: Oxygen: 2 liters/minute via nasal cannula   PO trials: ice chips, thin liquids by cup and straw, mildly thick liquids, puree, soft and bite sized, regular easy-to-chew, and minced moist  Behavioral observations: Alert and Distractible  Drooling/excess secretions: Within Normal Limits    Oral Mechanism Exam:    Oral mech exam completed, no gross oral motor deficits appreciated. Vocal quality strong, volitional cough and swallow WNL. Pt is edentulous and reports she does no wear her upper dentures, lost lower dentures.    Oral/Pharyngeal phase:    Laryngeal elevation palpated as complete, timely initiation of swallow appreciated and vocal quality remained clear thoroughjoint evaluation. Pt demonstrated functional mastication of soft and bite size. Prolonged mastication appreciated with trials of easy to chew, requiring multiple cues to swallow. Pt required mod cues to refrain from talking while eating. No s/sx of aspiration appreciated with any consistencies consumed.     Clinical Impressions:    Clinical signs of oral dysphagia, likely chronic related to edentulous state. Swallow " prognosis is good. Instrumental swallow study is not indicated. Pt appears appropriate for initiation of a PO diet.    Recommendations:    Initiation of diet soft and bite sized with thin liquids.     Swallowing instructions/precautions:   Supervision: Distant supervision, check 2-3 times per meal  Positioning: Seat fully upright and midline when eating  Medication: Whole with liquid wash   Swallow Techniques: small bites and sips, refrain from talking while chewing or swallowing and slow rate of pace  Oral Cares: Do oral cares before each meal        Plan    Recommend Speech Therapy 3 times per week until therapy goals are met for the following treatments:  Dysphagia Training and Patient / Family / Caregiver Education.    Discharge Recommendations: (P) Anticipate that the patient will have no further speech therapy needs after discharge from the hospital    Subjective    Pt alert, oriented to self/place/date, followed directions and participated with encouragement. Pt demonstrated sarcasm throughout session and occasionally made inappropriate comments.      Objective       11/15/21 1535   Oral Motor Eval    Is Patient Able to Complete Oral Motor Eval Yes, Within Normal Limits   Laryngeal Function   Voice Quality Within Functional Limits   Volutional Cough Within Functional Limits   Excursion Upon Swallow Complete   Oral Food Presentation   Ice Chips Within Functional Limits   Single Swallow Mildly Thick (2) - (Nectar Thick)  Within Functional Limits   Serial Swallow Mildly Thick (2) - (Nectar Thick) Within Functional Limits   Single Swallow Thin (0) Within Functional Limits   Serial Swallow Thin (0) Within Functional Limits   Pureed (4) Within Functional Limits   Minced & Moist (5) - (Dysphagia II) Within Functional Limits   Soft & Bite-Sized (6) - (Dysphagia III) Within Functional Limits   Regular-Easy to Chew (7) Minimal   Self Feeding Independent   Tracheostomy   Tracheostomy  No   Dysphagia Strategies /  "Recommendations   Strategies / Interventions Recommended (Yes / No) Yes   Compensatory Strategies Monitor During Meals;Head of Bed 90 Degrees During Eating / Drinking;No Talking During Eating / Drinking   Diet / Liquid Recommendation Thin (0);Soft & Bite-Sized (6) - (Dysphagia III)   Medication Administration  Whole with Liquid Wash   Therapy Interventions Dysphagia Therapy By Speech Language Pathologist   Dysphagia Rating   Nutritional Liquid Intake Rating Scale Non thickened beverages   Nutritional Food Intake Rating Scale Total oral diet with multiple consistencies but requiring special preparation or compensations   Patient / Family Goals   Patient / Family Goal #1 \"water\"   Short Term Goals   Short Term Goal # 1 Pt will consume a diet of SB6/TN0 with no s/sx of aspiration and min cues.          "

## 2021-11-15 NOTE — DIETARY
"Nutrition Support Assessment   Day 3 of admit.  Stella Prieto is a 66 y.o. female with admitting DX of STEMI.      Current problem list:  1. Shock  2. COPD  3. Acute respiratory failure with hypoxia and hypercapnia  4. AMS  5. Pneumonia  6. ARF w/ tubular necrosis  7. T2DM  8. Primary HTN  9. Dyslipidemia     Assessment:  Estimated Nutritional Needs: based on:   Height: 160 cm (5' 2.99\")  Weight: 82.6 kg (182 lb 1.6 oz)  Weight to Use in Calculations: 82.6 kg (182 lb 1.6 oz) - via bed scale.   Ideal Body Weight: 52.2 kg (115 lb)  Body mass index is 32.27 kg/m²., BMI classification: Obese Class I.     Calculation/Equation: MSJ x 1.1 - 1.2 = 1481 - 1616 kcal.   Total Calories / day: 1500 - 1700 (Calories / k - 21)  Total Grams Protein / day: 78 - 104 (Grams Protein / k.5 - 2.0 IBW)     Evaluation:   1. Pt remains NPO x 3 days (previously on the vent, since extubated to nasal cannula).  Consult received for TF recommendations.   2. Gastric Cortrak in place for enteral access.  3. Pt transferred from [facility] d/t chest pain.  Additional PMHx reviewed.  Current clinical picture and MD progress notes reviewed.  Pt underwent therapeutic thoracentesis .  4. No staged wounds per review of the EMR.  5. Labs: Glucose: 104, BUN: 51, Albumin: 2.7.  6. Meds: Solu-cortef, SSI, Electrolyte replacement.  No sedation or pressors running @ this time.  7. LBM: PTA.  8. Diabetisource AC is an appropriate CHO controlled formula to meet estimated nutritional needs.      Malnutrition Risk: Unable to be determined @ this time.      Recommendations/Plan:  1. Initiate Diabetisource AC @ 25 mL/hr and advance per protocol to goal rate of 55 mL/hr, providing 1584 kcal, 79 gm protein, 132 gm CHO, and 1082 mL of free water per day.  2. Fluids per MD.  3. Continue to monitor wt.  4. RD continues to monitor labs.  5. Diet upgrades per SLP as appropriate.    RD follows.                 "

## 2021-11-15 NOTE — PROGRESS NOTES
[facility] Cardiology Follow-up Note    Date of Service:    11/15/2021      Name:   Stella Prieto     YOB: 1955  Age:   66 y.o.  female   MRN:   [id number]      Chief Complaint: STEMI    Attending Provider:  Dr Mandy Lutz    HPI:   Stella Prieto is a 66 y.o. female who presented to [place] for chest pain x 8 days, found to have STEMI, given TNKase, aspirin, heparin drip, and Nitro and transdferred to [facility] for further treatment. Did not go to Cath lab initially for Shortly after arrival she had mental status changes with L sided weakness,     Upon arriving she was noted to have normal mentation however she developed mental status changes and left sided weakness, stroke alert was called, CT head without contrast was negative for hemorrhage, CTA of the head and neck showed carotid stenosis on the right, per Neuro.     PMHx: COPD, hypertension, dyslipidemia    Interim Events:  11/15/21  - Personal Telemetry interpretation: SR 80-90's  - Overnight events: tolerated extubation yesterday, on 2L NC, denies CP, NPO and tearful about not being able to drink water   - Vitals: -120's, 2L NC SpO2 95%  - Labs reviewed: WBC 29.4, BUN 51, Cr 0.9 (improved), CRP 6.59    ROS  Constitutional: +fatigue.  Respiratory:  Denies shortness of breath, + cough.  Cardiovascular: denies chest pain. denies lower extremity edema.  Denies orthopnea or PND.  : denies polyuria, no dysuria.  GI:  Denies nausea/vomiting.  No abdominal distention.  Neuro:  Denies dizziness, syncope.  Hem/lymph: Denies easy bleeding/bruising.      All other review of systems reviewed and negative.    Past medical, surgical, social, and family history reviewed and unchanged from admission except as noted in HPI.    Medications: Reviewed in MAR  Current Facility-Administered Medications   Medication Dose Frequency Provider Last Rate Last Admin   • aspirin (ASA) chewable tab 81 mg  81 mg DAILY SOPHIE Dillard       • atorvastatin (LIPITOR)  tablet 40 mg  40 mg Q EVENING Darya Rosales, A.P.R.N.       • buPROPion (WELLBUTRIN) tablet 150 mg  150 mg QAM Darya Rosales, A.P.R.N.       • doxycycline monohydrate (ADOXA) tablet 100 mg  100 mg Q12HRS Darya Rosales, A.P.R.N.       • acetaminophen (Tylenol) tablet 650 mg  650 mg Q4HRS PRN Jeremy M Gonda, M.D.       • escitalopram (Lexapro) tablet 10 mg  10 mg Q EVENING Jeremy M Gonda, M.D.       • topiramate (TOPAMAX) tablet 75 mg  75 mg Q EVENING Jeremy M Gonda, M.D.       • magnesium hydroxide (MILK OF MAGNESIA) suspension 30 mL  30 mL QDAY PRN Jeremy M Gonda, M.D.        And   • senna-docusate (PERICOLACE or SENOKOT S) 8.6-50 MG per tablet 2 Tablet  2 Tablet BID Jeremy M Gonda, M.D.        And   • polyethylene glycol/lytes (MIRALAX) PACKET 1 Packet  1 Packet QDAY PRN Jeremy M Gonda, M.D.        And   • bisacodyl (DULCOLAX) suppository 10 mg  10 mg QDAY PRN Jeremy M Gonda, M.D.       • Pharmacy Consult: Enteral tube insertion - review meds/change route/product selection  1 Each PHARMACY TO DOSE Jeremy M Gonda, M.D.       • hydrocortisone sodium succinate PF (Solu-CORTEF) 100 MG injection 50 mg  50 mg Q6HRS Jeremy M Gonda, M.D.   50 mg at 11/15/21 0545   • insulin regular (HumuLIN R,NovoLIN R) injection  2-9 Units Q6HRS Jeremy M Gonda, M.D.        And   • dextrose 50% (D50W) injection 50 mL  50 mL Q15 MIN PRN Jeremy M Gonda, M.D.       • heparin infusion 25,000 units in 500 mL 0.45% NACL  0-30 Units/kg/hr (Adjusted) Continuous Isaiah Beach M.D. 20.3 mL/hr at 11/15/21 0759 16 Units/kg/hr at 11/15/21 0759   • heparin injection 1,900 Units  30 Units/kg (Adjusted) PRN Isaiah Beach M.D.   1,900 Units at 11/14/21 2103   • norepinephrine (Levophed) 8 mg in 250 mL NS infusion (premix)  0-30 mcg/min Continuous Jeremy M Gonda, M.D.   Stopped at 11/14/21 1257   • MD Alert...ICU Electrolyte Replacement per Pharmacy   PHARMACY TO DOSE Amrit Bowles M.D.       • Respiratory Therapy Consult    "Continuous RT Amrit Bowles M.D.       • ipratropium-albuterol (DUONEB) nebulizer solution  3 mL Q2HRS PRN (RT) Amrit Bowles M.D.   3 mL at 11/12/21 2313   • cefTRIAXone (Rocephin) 1 g in  mL IVPB  1 g Q24HRS Amrit Bowles M.D.   Stopped at 11/15/21 0615   • vasopressin (VASOSTRICT) 20 Units in  mL Infusion  0.03 Units/min Continuous Amrit Bowles M.D.   Stopped at 11/14/21 1530   Last reviewed on 11/12/2021  7:24 PM by Reba Bermeo T    No Known Allergies    Physical Exam  Body mass index is 32.27 kg/m². /78   Pulse 83   Temp 37.5 °C (99.5 °F) (Bladder)   Resp 15   Ht 1.6 m (5' 2.99\")   Wt 82.6 kg (182 lb 1.6 oz)   SpO2 95%    Vitals:    11/15/21 0400 11/15/21 0500 11/15/21 0600 11/15/21 0800   BP: 134/87  121/78    Pulse: 81 (!) 104 83    Resp: 17 19 15    Temp:       TempSrc: Bladder  Bladder    SpO2: 94% 93% 95%    Weight:       Height:    1.6 m (5' 2.99\")    Oxygen Therapy:  Pulse Oximetry: 95 %, O2 (LPM): 2, FiO2%: 30 %, O2 Delivery Device: Silicone Nasal Cannula    General: no acute distress, chronically ill appearing,  Neck: no JVD, no bruits  Lungs: coughing, normal effort. no wheezing, rales. Rhonchi in lower lobes bilaterally  Heart: RRR, normal S1 /S2 no murmur, no rub  EXT: no lower extremity edema, 2+ radial pulses. 2+ pedal pulses.   Abdomen: soft, non tender, non distended  Neurological: No focal deficits, no facial asymmetry.  Normal speech  Psychiatric: Appropriate affect, alert and oriented x 3, tearful  Skin: Warm and dry extremities, no rashes    Labs (personally reviewed):     Lab Results   Component Value Date/Time    SODIUM 140 11/15/2021 03:10 AM    POTASSIUM 4.3 11/15/2021 03:10 AM    CHLORIDE 105 11/15/2021 03:10 AM    CO2 26 11/15/2021 03:10 AM    GLUCOSE 104 (H) 11/15/2021 03:10 AM    BUN 51 (H) 11/15/2021 03:10 AM    CREATININE 0.90 11/15/2021 03:10 AM     Lab Results   Component Value Date/Time    ALKPHOSPHAT 130 (H) 11/15/2021 " 03:10 AM    ASTSGOT 13 11/15/2021 03:10 AM    ALTSGPT 15 11/15/2021 03:10 AM    TBILIRUBIN 0.2 11/15/2021 03:10 AM      No results found for: CHOLSTRLTOT, LDL, HDL, TRIGLYCERIDE  Results for QUINN RAMIREZ (MRN [id number]) as of 11/15/2021 09:13   Ref. Range 11/12/2021 21:53 11/13/2021 04:00   Troponin T Latest Ref Range: 6 - 19 ng/L 455 (H)    NT-proBNP Latest Ref Range: 0 - 125 pg/mL 89711 (H) 32228 (H)     Cardiac Imaging and Procedures Review:      EKG 11/14/21: My Personal interpretation reveals SB 53, low voltage, ST elevation resolved    Echo 11/15/21:  Compared to the images of the prior study 11/12/21 there has been   improvement in akinesis of the apex.     Normal left ventricular size, thickness, systolic function, and   diastolic function.  The left ventricular ejection fraction is visually estimated to be 70%.  Hypokinesis of the apical inferior wall.  Normal right ventricular size and systolic function.  No significant valvular abnormalities.     [facility] 11/15/21:  (pending)    Assessment and Medical Decision Making:    STEMI  -s/p TNK at [facility] 11/12  -Heparin drip, D/C post Cath  -continue aspirin 81mg, atorva 40mg,   -Initiate BB after cath today, metop 12.5mg BID    AMS, Acute left sided weakness  -CT's negative  -[facility] was consulted, secondary to carotid stenosis/ischemia  -Still has not completed the MRI    Acute respiratory failure  -required intubation, extubated on 11/14 to 2LNC  -being treated for pneumonia  -s/p thoracentesis 11/14    Thank you for allowing me to participate in this patients care.  Please contact me with any questions or concerns.    Please see Dr. Ashraf attestation for additions and further recommendations.    PLEASE NOTE: Some of this dictation was created using voice recognition software. I have made every reasonable attempt to correct obvious errors, but I expect that there are errors of grammar and possibly content that I did not discover before finalizing the note.     Roxana  COLEMAN Hodge.   [facility]  [phone]

## 2021-11-15 NOTE — CARE PLAN
The patient is Watcher - Medium risk of patient condition declining or worsening    Shift Goals  Clinical Goals: Hemodynamic stability  Patient Goals: rest      Problem: Knowledge Deficit - Standard  Goal: Patient and family/care givers will demonstrate understanding of plan of care, disease process/condition, diagnostic tests and medications  Outcome: Progressing  Note: Discussed plan of care for shift with patient. Answered any questions they had.        Problem: Fall Risk  Goal: Patient will remain free from falls  Outcome: Progressing  Note: No falls this shift per unit protocol. Fall precautions implemented per unit protocol.        Problem: Skin Integrity  Goal: Skin integrity is maintained or improved  Outcome: Progressing  Note: Skin integrity maintained per unit protocol. Repositioning encouraged every 2 hours.

## 2021-11-15 NOTE — ASSESSMENT & PLAN NOTE
Likely reactive secondary to severe pneumonia  WBC count trending down continue to monitor if remains significantly elevated after pneumonia is resolved she may need further work-up with bone marrow biopsy

## 2021-11-15 NOTE — ASSESSMENT & PLAN NOTE
Bronchodilators per RT protocol  Resume Spiriva  Patient on stress dose hydrocortisone wean off to home dose of prednisone

## 2021-11-15 NOTE — ASSESSMENT & PLAN NOTE
Recent Labs     11/13/21  1151 11/13/21  1735 11/13/21  2337 11/14/21  0628 11/14/21  1151 11/14/21  1825 11/15/21  0004 11/15/21  0640   POCGLUCOSE 135* 140* 147* 146* 128* 99 98 92        Continue sliding-scale insulin monitor

## 2021-11-15 NOTE — ASSESSMENT & PLAN NOTE
Patient evaluated by neurology symptoms felt to be related to hypertension in the setting of moderate carotid stenosis  Continue aspirin and statin  Follow-up on MRI of brain  PT OT  She will likely need further evaluation for carotid stenosiswhen more  clinically stable

## 2021-11-15 NOTE — PROGRESS NOTES
"Critical Care Progress Note    Date of admission  11/12/2021    Chief Complaint  66 y.o. female admitted 11/12/2021 with STEMI, AMS, respiratory failure    Hospital Course  \"66 y.o. female history of COPD, diabetes and hypertension who presented 11/12/2021 with chest pain to [facility], ER.  EKG was consistent with STEMI and patient was given TNKase, heparin, nitro, aspirin and then sent to our facility for possible coronary artery angiogram.  Daughter gave a history of cough congestion and bronchitis-like symptoms for a week or so prior to admission.  She was given ceftriaxone prior to transfer for possible right lower lobe pneumonia.  On arrival here she is altered and had left-sided plegia and code stroke was called.  She had significant carotid stenosis but no intervention was suggested by exam or other images after neurology review.  However the stenosis was bad enough where neurology requested a blood pressure 120-140 range if possible since she neurologically was better at that higher pressure presumably due to better flow across the right carotid stenotic region.  I arrived to see her in the ER and she had some labored breathing and was altered for me and blood gas was being obtained that time and her PCO2 was in the 80s.  She was intubated and a central line was placed emergently.  She dropped her blood pressure after intubation.  500 cc fluid bolus was given and repeated x1 after reviewing the initial echocardiogram with cardiology who felt she was on the dry side by imaging.  Norepinephrine infusion was initiated and and initially blood pressure stabilized in the 120s on norepinephrine at 12.  See was subsequently moved to the [facility] and put tension persisted requiring additional pressors after norepinephrine was maxed at 30.  Follow-up EKG actually looked a little bit better and follow-up echocardiogram was unchanged.  Lactic acid was normal at 1.8.  Cardiology and I reviewed the case at length and our " "presumption is this is a neurogenic hemodynamic process.  The addition of epinephrine raised her heart rate but really did not help her blood pressure.  Transiently when her blood pressure was 140 she actually was spontaneously moving her right side and trying to open her eyes.  Currently adding vasopressin and will try Jem-Synephrine as well in hopes to wean off epinephrine and get her heart rate back down in the face of a STEMI.  Reviewed the option of stenting of coronary or stenting the carotid with cardiology and neurology and both think the risk outweigh the benefits at this time.  Patient is currently post TNK on a heparin drip.\" - Dr. Bowles 11/12 11/14 extubated, improving L sided weakness, R thoracentesis (800mL)      Interval Problem Update  Reviewed last 24 hour events:   - extubated without complications   - off pressors   - carotid doppler with moderate R ICA stenosis   - repeat echo pending   - cath today   - MRI brain pending   - AF, improving WBC   - NSR, -130   - heparin gtt   - plts down to 383   - improving VALENTINA, non-oliguric   - pleural fluid meets exudative criteria, bloody, pending cytology/culture   - EF 70% with persistent WMA    Review of Systems  Review of Systems   Constitutional: Negative for chills, fever and malaise/fatigue.   HENT: Negative for congestion and sore throat.    Eyes: Negative for blurred vision.   Respiratory: Positive for cough. Negative for sputum production and shortness of breath.    Cardiovascular: Negative for chest pain, palpitations and leg swelling.   Gastrointestinal: Negative for abdominal pain, diarrhea, nausea and vomiting.   Genitourinary: Negative for dysuria.   Musculoskeletal: Negative for back pain and myalgias.   Skin: Negative for rash.   Neurological: Positive for focal weakness. Negative for dizziness, sensory change, speech change and headaches.   Endo/Heme/Allergies: Does not bruise/bleed easily.   Psychiatric/Behavioral: Negative for " depression. The patient is not nervous/anxious.    All other systems reviewed and are negative.       Vital Signs for last 24 hours   Pulse:  [] 83  Resp:  [14-31] 15  BP: ()/(56-88) 121/78  SpO2:  [86 %-99 %] 95 %    Hemodynamic parameters for last 24 hours  CVP:  [5 MM HG-17 MM HG] 8 MM HG    Respiratory Information for the last 24 hours  Vent Mode: Spont  Rate (breaths/min): 18  Vt Target (mL): 380  PEEP/CPAP: 8  P Support: 5  MAP: 11  Control VTE (exp VT): 380 --> extubated 11/14 to 2 lpm n/c    Physical Exam   Physical Exam  Vitals and nursing note reviewed.   Constitutional:       General: She is awake. She is not in acute distress.     Appearance: She is overweight. She is not toxic-appearing.      Interventions: Nasal cannula in place.   HENT:      Head: Normocephalic.      Nose: Nose normal. No congestion.      Comments: Nasal feeding tube in place     Mouth/Throat:      Mouth: Mucous membranes are moist.      Pharynx: Oropharynx is clear. No oropharyngeal exudate.   Eyes:      General: No scleral icterus.     Extraocular Movements: Extraocular movements intact.      Conjunctiva/sclera: Conjunctivae normal.      Pupils: Pupils are equal, round, and reactive to light.   Neck:      Comments: Right IJ central venous catheter without surrounding erythema  Cardiovascular:      Rate and Rhythm: Normal rate and regular rhythm. Occasional extrasystoles are present.     Chest Wall: PMI is displaced.      Pulses: No decreased pulses.      Heart sounds: No murmur heard.      Pulmonary:      Effort: No tachypnea or respiratory distress.      Breath sounds: Transmitted upper airway sounds present. Examination of the right-lower field reveals rhonchi. Examination of the left-lower field reveals rhonchi. Rhonchi present. No decreased breath sounds.      Comments: Speaking in full sentences without difficulty  Abdominal:      General: Bowel sounds are normal. There is no distension.      Palpations: Abdomen is  soft.      Tenderness: There is no abdominal tenderness. There is no guarding.   Genitourinary:     Comments: Barry catheter in place  Musculoskeletal:         General: No tenderness.      Cervical back: Neck supple. No rigidity.      Right lower leg: No edema.      Left lower leg: No edema.   Skin:     General: Skin is warm and dry.      Capillary Refill: Capillary refill takes less than 2 seconds.      Findings: No erythema.   Neurological:      Mental Status: She is alert and oriented to person, place, and time.      Cranial Nerves: No cranial nerve deficit.      Sensory: No sensory deficit.      Comments: Residual mild left-sided weakness with pronator drift   Psychiatric:         Mood and Affect: Mood normal.         Behavior: Behavior is cooperative.         Medications  Current Facility-Administered Medications   Medication Dose Route Frequency Provider Last Rate Last Admin   • aspirin (ASA) chewable tab 81 mg  81 mg Oral DAILY Darya Rosales, A.P.R.N.       • atorvastatin (LIPITOR) tablet 40 mg  40 mg Oral Q EVENING Darya Rosales, A.P.R.N.       • buPROPion (WELLBUTRIN) tablet 150 mg  150 mg Oral QAM Darya Rosales, A.P.R.N.       • doxycycline monohydrate (ADOXA) tablet 100 mg  100 mg Oral Q12HRS Darya Rosales, A.P.R.N.       • acetaminophen (Tylenol) tablet 650 mg  650 mg Oral Q4HRS PRN Jeremy M Gonda, M.D.       • escitalopram (Lexapro) tablet 10 mg  10 mg Oral Q EVENING Jeremy M Gonda, M.D.       • topiramate (TOPAMAX) tablet 75 mg  75 mg Oral Q EVENING Jeremy M Gonda, M.D.       • magnesium hydroxide (MILK OF MAGNESIA) suspension 30 mL  30 mL Oral QDAY PRN Jeremy M Gonda, M.D.        And   • senna-docusate (PERICOLACE or SENOKOT S) 8.6-50 MG per tablet 2 Tablet  2 Tablet Oral BID Jeremy M Gonda, M.D.        And   • polyethylene glycol/lytes (MIRALAX) PACKET 1 Packet  1 Packet Oral QDAY PRN Jeremy M Gonda, M.D.        And   • bisacodyl (DULCOLAX) suppository 10 mg  10 mg Rectal QDAY PRN Selvin ROA  Gonda, M.D.       • Pharmacy Consult: Enteral tube insertion - review meds/change route/product selection  1 Each Other PHARMACY TO DOSE Jeremy M Gonda, M.D.       • hydrocortisone sodium succinate PF (Solu-CORTEF) 100 MG injection 50 mg  50 mg Intravenous Q6HRS Jeremy M Gonda, M.D.   50 mg at 11/15/21 0545   • insulin regular (HumuLIN R,NovoLIN R) injection  2-9 Units Subcutaneous Q6HRS Jeremy M Gonda, M.D.        And   • dextrose 50% (D50W) injection 50 mL  50 mL Intravenous Q15 MIN PRN Jeremy M Gonda, M.D.       • heparin infusion 25,000 units in 500 mL 0.45% NACL  0-30 Units/kg/hr (Adjusted) Intravenous Continuous Isaiah Beach M.D. 20.3 mL/hr at 11/15/21 0352 16 Units/kg/hr at 11/15/21 0352   • heparin injection 1,900 Units  30 Units/kg (Adjusted) Intravenous PRN Isaiah Beach M.D.   1,900 Units at 11/14/21 2103   • norepinephrine (Levophed) 8 mg in 250 mL NS infusion (premix)  0-30 mcg/min Intravenous Continuous Jeremy M Gonda, M.D.   Stopped at 11/14/21 1257   • MD Alert...ICU Electrolyte Replacement per Pharmacy   Other PHARMACY TO DOSE Amrit Bowles M.D.       • Respiratory Therapy Consult   Nebulization Continuous RT Amrit Bowles M.D.       • ipratropium-albuterol (DUONEB) nebulizer solution  3 mL Nebulization Q2HRS PRN (RT) Amrit Bowles M.D.   3 mL at 11/12/21 2313   • cefTRIAXone (Rocephin) 1 g in  mL IVPB  1 g Intravenous Q24HRS Amrit Bowles M.D.   Stopped at 11/15/21 0615   • vasopressin (VASOSTRICT) 20 Units in  mL Infusion  0.03 Units/min Intravenous Continuous Amrit Bowles M.D.   Stopped at 11/14/21 1530       Fluids    Intake/Output Summary (Last 24 hours) at 11/15/2021 0653  Last data filed at 11/15/2021 0600  Gross per 24 hour   Intake 381.47 ml   Output 1590 ml   Net -1208.53 ml       Laboratory  Recent Labs     11/12/21  2304 11/13/21  0246 11/14/21  0444   ISTATAPH 7.224* 7.349* 7.443   ISTATAPCO2 49.4* 42.3* 32.5   ISTATAPO2 75 56* 86    ISTATATCO2 22 25 23   FPDWLBA1YIN 92* 87* 97   ISTATARTHCO3 20.4 23.3 22.2   ISTATARTBE -7* -2 -1   ISTATTEMP 37.1 C 38.2 C 37.9 C   ISTATFIO2 50 30 40   ISTATSPEC Arterial Arterial Arterial   ISTATAPHTC 7.222* 7.331* 7.429   [id number] 75 61* 92*         Recent Labs     11/13/21  0400 11/13/21  0400 11/13/21  1050 11/14/21  0305 11/15/21  0310   SODIUM 132*   < > 135 136 140   POTASSIUM 4.4   < > 4.6 4.2 4.3   CHLORIDE 94*   < > 100 100 105   CO2 22   < > 21 23 26   BUN 43*   < > 51* 58* 51*   CREATININE 1.19   < > 1.29 1.27 0.90   MAGNESIUM 2.3  --   --  2.4 2.5   PHOSPHORUS 5.8*  --   --  3.8 4.5   CALCIUM 8.3*   < > 7.9* 8.4* 8.6    < > = values in this interval not displayed.     Recent Labs     11/13/21  1050 11/14/21  0305 11/15/21  0310   ALTSGPT 21 17 15   ASTSGOT 16 13 13   ALKPHOSPHAT 185* 144* 130*   TBILIRUBIN 0.3 0.3 0.2   PREALBUMIN  --   --  13.3*   GLUCOSE 148* 146* 104*     Recent Labs     11/13/21  0400 11/13/21  0400 11/13/21  1050 11/14/21  0305 11/14/21  1100 11/15/21  0310   WBC 58.8*  --   --  35.2*  --  29.4*   NEUTSPOLYS 75.90*  --   --  84.20*  --  78.90*   LYMPHOCYTES 0.90*  --   --  3.50*  --  4.40*   MONOCYTES 0.90  --   --  1.80  --  3.50   EOSINOPHILS 0.00   < >  --  0.00 2 0.00   BASOPHILS 0.80  --   --  0.00  --  0.00   ASTSGOT  --   --  16 13  --  13   ALTSGPT  --   --  21 17  --  15   ALKPHOSPHAT  --   --  185* 144*  --  130*   TBILIRUBIN  --   --  0.3 0.3  --  0.2    < > = values in this interval not displayed.     Recent Labs     11/12/21  1649 11/12/21  2153 11/13/21  0400 11/13/21  0400 11/13/21  1735 11/14/21  0305 11/15/21  0310   RBC  --    < > 3.81*  --   --  3.18* 3.09*   HEMOGLOBIN  --    < > 11.9*   < > 10.0* 9.6* 9.3*   HEMATOCRIT  --    < > 35.7*   < > 30.0* 29.3* 30.0*   PLATELETCT  --    < > 640*  --   --  456* 383   PROTHROMBTM 14.7*  --   --   --   --   --   --    APTT 102.3*  --   --   --   --   --   --    INR 1.19*  --   --   --   --   --   --     < > =  values in this interval not displayed.       Imaging  X-Ray:  I have personally reviewed the images and compared with prior images. and No film today  Echo:   Reviewed  Ultrasound:  Reviewed    Assessment/Plan  * ST elevation myocardial infarction involving left anterior descending (LAD) coronary artery (HCC)- (present on admission)  Assessment & Plan  Status post ASA/TNK at [place] 11/12  Cardiology consulted, coronary angiography/PCI planned for today  Continue heparin drip, antiplatelets, statin  Beta-blocker when appropriate    Acute left-sided weakness  Assessment & Plan  Felt to be secondary to carotid stenosis/ischemia, flow dependent -improving  Neurology consulted  Continue antiplatelet, statin   maintain perfusion  PT/OT/SLP eval  MRI brain pending again today    Pneumonia due to infectious organism  Assessment & Plan  Continue Rocephin and doxycycline  Follow-up on outside hospital as well as [facility] cultures  Parapneumonic effusion s/p thoracentesis 11/14, follow-up on cytology/culture    Acute respiratory failure with hypoxia and hypercapnia (HCC)  Assessment & Plan  Intubated 11/12-11/14  Encourage incentive spirometry/PEP  RT/O2 protocol  Limit sedatives    Shock (HCC)  Assessment & Plan  Undifferentiated, EF 75% -likely septic shock predominantly --> improved  Wean stress dose steroids given home steroid use for relative adrenal insufficiency  Continue empiric antibiotics  Euvolemic, hold additional fluids    Type 2 diabetes mellitus with hyperglycemia, without long-term current use of insulin (HCC)  Assessment & Plan  Continue insulin sliding scale with goal glucose between 120 and 180  Resume diabetic diet once passes SLP    Acute renal failure with tubular necrosis (HCC)  Assessment & Plan  Improving, secondary to ATN, nonoliguric  Avoid nephrotoxins  Monitor creatinine, urine output, electrolytes closely    Altered level of consciousness  Assessment & Plan  Likely multifactorial - improving  Limit  sedatives with close neurologic monitoring  Reorient and maintain sleep/wake cycle    COPD (chronic obstructive pulmonary disease) (HCC)  Assessment & Plan  Without acute exacerbation -monitor for wheezing/bronchospasm  RT/O2 protocol  Continue as needed bronchodilators    Dyslipidemia  Assessment & Plan  Continue high intensity statin    Primary hypertension- (present on admission)  Assessment & Plan  Controlled  Resume scheduled antihypertensives and monitor    Thrombocytosis  Assessment & Plan  Likely reactive, monitor with treatment - improving daily    Prolonged Q-T interval on ECG  Assessment & Plan  Avoid QT prolonging medications  Optimize electrolytes, continuous telemetry monitoring    Leukocytosis  Assessment & Plan  Marked elevated WBC, question leukemoid reaction, ?  Malignancy -improving  Continue to monitor with antibiotics       VTE:  Heparin  Ulcer: H2 Antagonist  Lines: Central Line  To be removed today, Arterial Line  To be removed today and Barry Catheter  To be removed today    I have performed a physical exam and reviewed and updated ROS and Plan today (11/15/2021). In review of yesterday's note (11/14/2021), there are no changes except as documented above.     Discussed patient condition and risk of morbidity and/or mortality with Hospitalist, RN, RT, Pharmacy, Charge nurse / hot rounds, Patient and cardiology.  [facility] critical care will sign off.  Please call with questions.

## 2021-11-15 NOTE — ASSESSMENT & PLAN NOTE
Status post thoracentesis on 11/14/2021  Exudative effusion likely parapneumonic    Follow-up on cultures and cytology  Continue antibiotics for pneumonia

## 2021-11-15 NOTE — PROCEDURES
Cardiac Catheterization Laboratory Procedure Note    DATE: 11/15/2021    : Valerio Kaminski MD    PROCEDURES PERFORMED:  1. Left heart catheterization  2. Coronary angiography  3. Moderate conscious sedation    INDICATIONS:  The patient is a 66-year-old woman referred for cardiac catheterization to evaluate an abnormal ECG concerning for a possible ST elevation myocardial infarction that was initially treated with thrombolytic therapy.    CONSENT:  The complete alternatives, risks, and benefits of the procedure were explained to the patient. Signed informed consent was obtained and placed in the chart prior to the procedure.  A timeout was performed prior to beginning procedure.    MEDICATIONS:  1. Lidocaine  2. Fentanyl  3. Midazolam  4. Nitroglycerin  5. Verapamil  6. Heparin    MODERATE CONSCIOUS SEDATION:  I personally supervised the administration of moderate conscious sedation by the nursing staff for 18 minutes.    CONTRAST: Omnipaque 30 cc    ACCESS: 6-Nigerien Glidesheath in the right radial artery.    ESTIMATED BLOOD LOSS: 10 cc    COMPLICATIONS: None    DESCRIPTION OF PROCEDURE:  The patient was brought to the cardiac catheterization laboratory in the fasting state. The skin over the right wrist was prepped and draped in the usual sterile fashion. Lidocaine infiltration was used to anesthetize the tissue over the right radial artery. Using the micropuncture technique, a 6-Nigerien Glidesheath was inserted in the right radial artery. A 5-Nigerien Luis catheter was then advanced over a standard J-wire into the left ventricular cavity where it was gently aspirated, flushed, and then withdrawn across the aortic valve with sequential pressures measured. This catheter was then used to engage the ostium of the left main coronary artery and cineangiograms were obtained in multiple projections for complete evaluation of the left coronary system. This catheter was then used to engage the ostium of the right  coronary artery and cineangiograms were obtained in multiple projections for complete evaluation of the right coronary system. Following completion of coronary angiography, all wires, catheters, and sheaths were removed.  A TR band was placed using the patent hemostasis technique.    HEMODYNAMICS:   1. Aortic pressure: 130/68 mmHg  2. Pre A-wave pressure: 21 mmHg  3. No significant aortic gradient on pullback    CORONARY ANGIOGRAPHY:  1. The left main coronary artery is patent and trifurcates into the left anterior descending, ramus intermedius, and left circumflex coronary arteries.  2. The left anterior descending coronary artery is a small vessel with calcified proximal luminal irregularities prior to trifurcating in the mid vessel into a small septal , a large first diagonal branch, and a small ongoing LAD that tapers and terminates at the apex and has luminal irregularities.   3. The ramus intermedius is a moderate vessel with luminal irregularities.  4. The left circumflex coronary artery is a small, nondominant vessel that supplies one obtuse marginal branch and has no angiographically significant disease.  5. The right coronary artery is a large, dominant vessel with proximal 20% disease, mid 30% disease, and distal luminal irregularities.  Distally, it bifurcates into a large posterior descending coronary artery and a large posterolateral system with luminal irregularities.    IMPRESSION:  1. Mild nonobstructive coronary artery disease  2. Mildly elevated left heart filling pressures    RECOMMENDATIONS:  1. Return to floor with continued care per primary service  2. TR band release per protocol  3. Continue optimal medical management of suspected stress-induced cardiomyopathy    NOTIFICATION:  The patient's family was notified of the results of her cardiac catheterization.

## 2021-11-16 LAB
ANION GAP SERPL CALC-SCNC: 8 MMOL/L (ref 7–16)
ANISOCYTOSIS BLD QL SMEAR: ABNORMAL
BASOPHILS # BLD AUTO: 0 % (ref 0–1.8)
BASOPHILS # BLD: 0 K/UL (ref 0–0.12)
BUN SERPL-MCNC: 34 MG/DL (ref 8–22)
CALCIUM SERPL-MCNC: 8.4 MG/DL (ref 8.5–10.5)
CHLORIDE SERPL-SCNC: 107 MMOL/L (ref 96–112)
CO2 SERPL-SCNC: 29 MMOL/L (ref 20–33)
CREAT SERPL-MCNC: 0.58 MG/DL (ref 0.5–1.4)
CYTOLOGY REG CYTOL: NORMAL
EOSINOPHIL # BLD AUTO: 0 K/UL (ref 0–0.51)
EOSINOPHIL NFR BLD: 0 % (ref 0–6.9)
ERYTHROCYTE [DISTWIDTH] IN BLOOD BY AUTOMATED COUNT: 51.7 FL (ref 35.9–50)
GLUCOSE SERPL-MCNC: 108 MG/DL (ref 65–99)
HCT VFR BLD AUTO: 29.1 % (ref 37–47)
HGB BLD-MCNC: 8.9 G/DL (ref 12–16)
HYPOCHROMIA BLD QL SMEAR: ABNORMAL
LYMPHOCYTES # BLD AUTO: 0.77 K/UL (ref 1–4.8)
LYMPHOCYTES NFR BLD: 3.5 % (ref 22–41)
MANUAL DIFF BLD: NORMAL
MCH RBC QN AUTO: 30.1 PG (ref 27–33)
MCHC RBC AUTO-ENTMCNC: 30.6 G/DL (ref 33.6–35)
MCV RBC AUTO: 98.3 FL (ref 81.4–97.8)
METAMYELOCYTES NFR BLD MANUAL: 4.4 %
MONOCYTES # BLD AUTO: 0.97 K/UL (ref 0–0.85)
MONOCYTES NFR BLD AUTO: 4.4 % (ref 0–13.4)
MORPHOLOGY BLD-IMP: NORMAL
MYELOCYTES NFR BLD MANUAL: 4.4 %
NEUTROPHILS # BLD AUTO: 18.33 K/UL (ref 2–7.15)
NEUTROPHILS NFR BLD: 82.4 % (ref 44–72)
NEUTS BAND NFR BLD MANUAL: 0.9 % (ref 0–10)
NRBC # BLD AUTO: 0.04 K/UL
NRBC BLD-RTO: 0.2 /100 WBC
PLATELET # BLD AUTO: 428 K/UL (ref 164–446)
PLATELET BLD QL SMEAR: NORMAL
PMV BLD AUTO: 9.9 FL (ref 9–12.9)
POLYCHROMASIA BLD QL SMEAR: NORMAL
POTASSIUM SERPL-SCNC: 4 MMOL/L (ref 3.6–5.5)
RBC # BLD AUTO: 2.96 M/UL (ref 4.2–5.4)
RBC BLD AUTO: PRESENT
SODIUM SERPL-SCNC: 144 MMOL/L (ref 135–145)
WBC # BLD AUTO: 22 K/UL (ref 4.8–10.8)

## 2021-11-16 PROCEDURE — 85027 COMPLETE CBC AUTOMATED: CPT

## 2021-11-16 PROCEDURE — 94760 N-INVAS EAR/PLS OXIMETRY 1: CPT

## 2021-11-16 PROCEDURE — A9270 NON-COVERED ITEM OR SERVICE: HCPCS | Performed by: NURSE PRACTITIONER

## 2021-11-16 PROCEDURE — 97162 PT EVAL MOD COMPLEX 30 MIN: CPT

## 2021-11-16 PROCEDURE — 97165 OT EVAL LOW COMPLEX 30 MIN: CPT

## 2021-11-16 PROCEDURE — 700102 HCHG RX REV CODE 250 W/ 637 OVERRIDE(OP): Performed by: INTERNAL MEDICINE

## 2021-11-16 PROCEDURE — 700111 HCHG RX REV CODE 636 W/ 250 OVERRIDE (IP): Performed by: HOSPITALIST

## 2021-11-16 PROCEDURE — 92526 ORAL FUNCTION THERAPY: CPT

## 2021-11-16 PROCEDURE — 97161 PT EVAL LOW COMPLEX 20 MIN: CPT

## 2021-11-16 PROCEDURE — A9270 NON-COVERED ITEM OR SERVICE: HCPCS | Performed by: STUDENT IN AN ORGANIZED HEALTH CARE EDUCATION/TRAINING PROGRAM

## 2021-11-16 PROCEDURE — 85007 BL SMEAR W/DIFF WBC COUNT: CPT

## 2021-11-16 PROCEDURE — 94640 AIRWAY INHALATION TREATMENT: CPT

## 2021-11-16 PROCEDURE — 80048 BASIC METABOLIC PNL TOTAL CA: CPT

## 2021-11-16 PROCEDURE — A9270 NON-COVERED ITEM OR SERVICE: HCPCS | Performed by: INTERNAL MEDICINE

## 2021-11-16 PROCEDURE — 700111 HCHG RX REV CODE 636 W/ 250 OVERRIDE (IP): Performed by: INTERNAL MEDICINE

## 2021-11-16 PROCEDURE — 99233 SBSQ HOSP IP/OBS HIGH 50: CPT | Performed by: STUDENT IN AN ORGANIZED HEALTH CARE EDUCATION/TRAINING PROGRAM

## 2021-11-16 PROCEDURE — 700102 HCHG RX REV CODE 250 W/ 637 OVERRIDE(OP): Performed by: NURSE PRACTITIONER

## 2021-11-16 PROCEDURE — 700111 HCHG RX REV CODE 636 W/ 250 OVERRIDE (IP): Performed by: STUDENT IN AN ORGANIZED HEALTH CARE EDUCATION/TRAINING PROGRAM

## 2021-11-16 PROCEDURE — 770020 HCHG ROOM/CARE - TELE (206)

## 2021-11-16 PROCEDURE — 700102 HCHG RX REV CODE 250 W/ 637 OVERRIDE(OP): Performed by: STUDENT IN AN ORGANIZED HEALTH CARE EDUCATION/TRAINING PROGRAM

## 2021-11-16 PROCEDURE — 700105 HCHG RX REV CODE 258: Performed by: INTERNAL MEDICINE

## 2021-11-16 PROCEDURE — 700101 HCHG RX REV CODE 250: Performed by: INTERNAL MEDICINE

## 2021-11-16 RX ORDER — TRAZODONE HYDROCHLORIDE 100 MG/1
100 TABLET ORAL
Status: DISCONTINUED | OUTPATIENT
Start: 2021-11-16 | End: 2021-11-16

## 2021-11-16 RX ORDER — PREDNISONE 20 MG/1
20 TABLET ORAL DAILY
Status: DISCONTINUED | OUTPATIENT
Start: 2021-11-16 | End: 2021-11-17 | Stop reason: HOSPADM

## 2021-11-16 RX ORDER — ALBUTEROL SULFATE 90 UG/1
2 AEROSOL, METERED RESPIRATORY (INHALATION)
Status: DISCONTINUED | OUTPATIENT
Start: 2021-11-16 | End: 2021-11-17

## 2021-11-16 RX ORDER — TRAZODONE HYDROCHLORIDE 100 MG/1
100 TABLET ORAL
Status: DISCONTINUED | OUTPATIENT
Start: 2021-11-16 | End: 2021-11-17 | Stop reason: HOSPADM

## 2021-11-16 RX ORDER — TRAMADOL HYDROCHLORIDE 50 MG/1
50 TABLET ORAL EVERY 6 HOURS PRN
Status: DISCONTINUED | OUTPATIENT
Start: 2021-11-16 | End: 2021-11-17

## 2021-11-16 RX ADMIN — ALBUTEROL SULFATE 2 PUFF: 90 AEROSOL, METERED RESPIRATORY (INHALATION) at 11:01

## 2021-11-16 RX ADMIN — HYDROCORTISONE SODIUM SUCCINATE 50 MG: 100 INJECTION, POWDER, FOR SOLUTION INTRAMUSCULAR; INTRAVENOUS at 05:43

## 2021-11-16 RX ADMIN — SENNOSIDES AND DOCUSATE SODIUM 2 TABLET: 50; 8.6 TABLET ORAL at 18:36

## 2021-11-16 RX ADMIN — SENNOSIDES AND DOCUSATE SODIUM 2 TABLET: 50; 8.6 TABLET ORAL at 09:18

## 2021-11-16 RX ADMIN — IPRATROPIUM BROMIDE AND ALBUTEROL SULFATE 3 ML: .5; 2.5 SOLUTION RESPIRATORY (INHALATION) at 15:11

## 2021-11-16 RX ADMIN — BUPROPION HYDROCHLORIDE 150 MG: 100 TABLET, FILM COATED ORAL at 09:17

## 2021-11-16 RX ADMIN — TOPIRAMATE 75 MG: 25 TABLET, FILM COATED ORAL at 18:35

## 2021-11-16 RX ADMIN — GUAIFENESIN 200 MG: 100 SOLUTION ORAL at 23:12

## 2021-11-16 RX ADMIN — ALBUTEROL SULFATE 2 PUFF: 90 AEROSOL, METERED RESPIRATORY (INHALATION) at 07:27

## 2021-11-16 RX ADMIN — DOXYCYCLINE 100 MG: 100 TABLET, FILM COATED ORAL at 09:17

## 2021-11-16 RX ADMIN — TRAMADOL HYDROCHLORIDE 50 MG: 50 TABLET, COATED ORAL at 20:31

## 2021-11-16 RX ADMIN — PREDNISONE 20 MG: 20 TABLET ORAL at 15:25

## 2021-11-16 RX ADMIN — ATORVASTATIN CALCIUM 40 MG: 40 TABLET, FILM COATED ORAL at 18:36

## 2021-11-16 RX ADMIN — ESCITALOPRAM OXALATE 10 MG: 10 TABLET ORAL at 09:18

## 2021-11-16 RX ADMIN — DOXYCYCLINE 100 MG: 100 TABLET, FILM COATED ORAL at 18:36

## 2021-11-16 RX ADMIN — IPRATROPIUM BROMIDE AND ALBUTEROL SULFATE 3 ML: .5; 2.5 SOLUTION RESPIRATORY (INHALATION) at 02:30

## 2021-11-16 RX ADMIN — METOPROLOL TARTRATE 12.5 MG: 25 TABLET, FILM COATED ORAL at 05:46

## 2021-11-16 RX ADMIN — TRAZODONE HYDROCHLORIDE 100 MG: 100 TABLET ORAL at 20:32

## 2021-11-16 RX ADMIN — CEFTRIAXONE SODIUM 1 G: 1 INJECTION, POWDER, FOR SOLUTION INTRAMUSCULAR; INTRAVENOUS at 05:44

## 2021-11-16 RX ADMIN — METOPROLOL TARTRATE 12.5 MG: 25 TABLET, FILM COATED ORAL at 18:36

## 2021-11-16 RX ADMIN — TRAMADOL HYDROCHLORIDE 50 MG: 50 TABLET, COATED ORAL at 12:55

## 2021-11-16 RX ADMIN — ALBUTEROL SULFATE 2 PUFF: 90 AEROSOL, METERED RESPIRATORY (INHALATION) at 14:53

## 2021-11-16 RX ADMIN — ASPIRIN 81 MG: 81 TABLET, CHEWABLE ORAL at 09:16

## 2021-11-16 ASSESSMENT — PAIN DESCRIPTION - PAIN TYPE
TYPE: ACUTE PAIN

## 2021-11-16 ASSESSMENT — ENCOUNTER SYMPTOMS
DIZZINESS: 0
SPEECH CHANGE: 0
FOCAL WEAKNESS: 0
NAUSEA: 0
HEARTBURN: 0
CHILLS: 0
ABDOMINAL PAIN: 0
NERVOUS/ANXIOUS: 0
FLANK PAIN: 0
MYALGIAS: 0
FALLS: 0
DOUBLE VISION: 0
SHORTNESS OF BREATH: 1
BLURRED VISION: 0
PALPITATIONS: 0
FEVER: 0
COUGH: 1
VOMITING: 0
DEPRESSION: 0
BRUISES/BLEEDS EASILY: 0

## 2021-11-16 ASSESSMENT — COGNITIVE AND FUNCTIONAL STATUS - GENERAL
WALKING IN HOSPITAL ROOM: A LITTLE
MOBILITY SCORE: 18
SUGGESTED CMS G CODE MODIFIER DAILY ACTIVITY: CH
STANDING UP FROM CHAIR USING ARMS: A LITTLE
CLIMB 3 TO 5 STEPS WITH RAILING: A LOT
DAILY ACTIVITIY SCORE: 24
MOVING FROM LYING ON BACK TO SITTING ON SIDE OF FLAT BED: A LITTLE
MOVING TO AND FROM BED TO CHAIR: A LITTLE
SUGGESTED CMS G CODE MODIFIER MOBILITY: CK

## 2021-11-16 ASSESSMENT — GAIT ASSESSMENTS
ASSISTIVE DEVICE: FRONT WHEEL WALKER
DISTANCE (FEET): 25
DEVIATION: DECREASED BASE OF SUPPORT;SHUFFLED GAIT;BRADYKINETIC

## 2021-11-16 ASSESSMENT — ACTIVITIES OF DAILY LIVING (ADL): TOILETING: INDEPENDENT

## 2021-11-16 NOTE — RESPIRATORY CARE
"COPD EDUCATION by COPD CLINICAL EDUCATOR  (Phone: [phone])  11/15/2021 at 5:57 PM by Nay Odonnell RRT     Patient seen by Respiratory Education team to complete Block 1 of a 2 part series. Reference material about the program was given to patient along with our contact information.  Part #1 includes: What is COPD (how the lungs work), common treatments for COPD, the difference between \"rescue\" medications and \"daily\" medications, bronchial hygiene, and explanation of the Action Plan. We discussed appropriate medication technique along with a demonstration, and the correct way to care for and clean equipment.  Advance directives and smoking cessation were discussed as appropriate to this patient. Question and answer session followed. Patient was given a spacer, flutter and SVN with instructions on how to use. Patient would like a home nebulizer and prescription for the medication, will request this from the attending MD.    Smoking Cessation Intervention and education completed, 5 minutes spent on smoking cessation education with patient.    Provided smoking cessation packet with \"Tips to Quit\" and brochure for \"Free Smoking Cessation Classes\".     COPD Screen  COPD Risk Screening  Do you have a history of COPD?: Yes  Do you have a Pulmonologist?: No  COPD Population Screener  During the past 4 weeks, how much did you feel short of breath?: Some of the time  Do you ever cough up any mucus or phlegm?: Yes, a few days a week or month  In the past 12 months, you do less than you used to because of your breathing problems: Disagree/unsure  Have you smoked at least 100 cigarettes in your entire life?: Yes  How old are you?: 60+  COPD Screening Score: 6  COPD Coordinator Recommended: Yes    COPD Assessment  COPD Clinical Specialists ONLY  COPD Education Initiated: Yes--Full Intervention (Pt from [facility] but wants to follow up with [facility]. Will request home neb and RX for meds.)  COPD Education Session 1: " "Yes (Given COPD and Smoking Cessation literature, SVN, flutter, and spacer)  DME Equipment Type: O2, request nebulizer  Pulmonologist Name: wants to follow up with [facility], will request appt  Referrals Initiated: Yes  Pulmonary Rehab: Yes (given information)  Smoking Cessation: Yes  $ Smoking Cessation 3-10 Minutes: Symptomatic (5 min)  Hospice: N/A  Home Health Care: N/A  [facility] Outreach: N/A (unavailable)  [facility]: N/A  [facility]: N/A (lives outside of service area)  Private In-Home Care Agency: N/A  Is this a COPD exacerbation patient?: No  $ Demo/Eval of SVN's, MDI's and Aerosols: Yes (given spacer, flutter and SVN w/ instructions)  (OP) Pulmonary Function Testing: Yes (results unavailable)    Meds to Beds  Would the patient like to opt in for Bedside Medication Delivery at Discharge?: No     MY COPD ACTION PLAN     It is recommended that patients and physicians /healthcare providers complete this action plan together. This plan should be discussed at each physician visit and updated as needed.    The green, yellow and red zones show groups of symptoms of COPD. This list of symptoms is not comprehensive, and you may experience other symptoms. In the \"Actions\" column, your healthcare provider has recommended actions for you to take based on your symptoms.    Patient Name: Stella Prieto   YOB: 1955   Last Updated on:     Green Zone:  I am doing well today Actions   •  Usual activitiy and exercise level •  Take daily medications   •  Usual amounts of cough and phlegm/mucus •  Use oxygen as prescribed   •  Sleep well at night •  Continue regular exercise/diet plan   •  Appetite is good •  At all times avoid cigarette smoke, inhaled irritants     Daily Medications (these medications are taken every day):   Tiotropium Bromide Monohydrate (Spiriva)  Albuterol/Ipratropium (Combivent, Duoneb) 2 Puffs  1 Puff Once daily  Four Times daily     Additional Information:  " "Use either the Spiriva OR the Combivent, DO NOT USE AT THE SAME TIME.    *It would be best to decide which medication you prefer (cost, ease of use, effectiveness) then stick to that one.    Yellow Zone:  I am having a bad day or a COPD flare Actions   •  More breathless than usual •  Continue daily medications   •  I have less energy for my daily activities •  Use quick relief inhaler as ordered   •  Increased or thicker phlegm/mucus •  Use oxygen as prescribed   •  Using quick relief inhaler/nebulizer more often •  Get plenty of rest   •  Swelling of ankles more than usual •  Use pursed lip breathing   •  More coughing than usual •  At all times avoid cigarette smoke, inhaled irritants   •  I feel like I have a \"chest cold\"   •  Poor sleep and my symptoms woke me up   •  My appetite is not good   •  My medicine is not helping    •  Call provider immediately if symptoms don’t improve     Continue daily medications, add rescue medications:   Albuterol  Albuterol 2 Puffs  2.5mg via nebulizer Every 4 hours PRN  Every 4 hours PRN       Medications to be used during a flare up, (as Discussed with Provider):           Additional Information:  Use the spacer with your rescue inhaler.    If really short of breath it's best to use the nebulizer.    If really congested use the Airobika flutter with the nebulizer for combined therapy.    Red Zone:  I need urgent medical care Actions   •  Severe shortness of breath even at rest •  Call 911 or seek medical care immediately   •  Not able to do any activity because of breathing    •  Fever or shaking chills    •  Feeling confused or very drowsy     •  Chest pains    •  Coughing up blood              "

## 2021-11-16 NOTE — CARE PLAN
The patient is Watcher - Medium risk of patient condition declining or worsening    Shift Goals  Clinical Goals: comfort, hemodynamic stability  Patient Goals: comfort  Family Goals: comfort    Progress made toward(s) clinical / shift goals:      Pt educated on bed alarm purpose and provided extra pillows to help with turns/positioning  Problem: Fall Risk  Goal: Patient will remain free from falls  Outcome: Not Progressing     Problem: Skin Integrity  Goal: Skin integrity is maintained or improved  Outcome: Not Progressing       Patient is not progressing towards the following goals:      Problem: Fall Risk  Goal: Patient will remain free from falls  Outcome: Not Progressing     Problem: Skin Integrity  Goal: Skin integrity is maintained or improved  Outcome: Not Progressing

## 2021-11-16 NOTE — THERAPY
Speech Language Pathology  Daily Treatment     Patient Name: Stella Prieto  Age:  66 y.o., Sex:  female  Medical Record #: [id number]  Today's Date: 11/16/2021     Precautions  Precautions: (P) Swallow Precautions ( See Comments)    Assessment    Pt seen this date for dysphagia intervention. Per RN, pt and family, pt tolerating current diet of SB6/TN0 without difficulty. PO trials of easy to chew and thins assessed. Timely swallow initiation and clear vocal quality appreciated. Pt demonstrated functional mastication of easy to chew with no oral residue appreciated. Pt and pt's daughter endorse difficulty with dry or tough meats at baseline. No s/sx of aspiration appreciated with any consistencies consumed. Pt refrained from talking while eating independently, significant improvement compared to evaluation.     Recommend diet upgrade to easy to chew/thins with adherence to safe swallow strategies (upright at 90* for PO, slow rate, small bites/sips, straws okay, meds as tolerated). SLP following.     Plan    Continue current treatment plan.    Discharge Recommendations: (P) Anticipate that the patient will have no further speech therapy needs after discharge from the hospital    Subjective    Pt A&Ox4, followed directions and participated fully in session.      Objective       11/16/21 1512   Dysphagia    Dysphagia X   Positioning / Behavior Modification Self Monitoring;Cough / Clear after Swallow;Modulate Rate or Bite Size   Other Treatments PO trials EC7 thins   Diet / Liquid Recommendation Thin (0);Regular - Easy to Chew (7)   Nutritional Liquid Intake Rating Scale Non thickened beverages   Nutritional Food Intake Rating Scale Total oral diet with multiple consistencies without special preparation but with specific food limitations   Skilled Intervention Compensatory Strategies;Verbal Cueing   Recommended Route of Medication Administration   Medication Administration  Whole with Liquid Wash   Patient / Family Goals  "  Patient / Family Goal #1 \"water\"   Goal #1 Outcome Goal met   Short Term Goals   Short Term Goal # 1 Pt will consume a diet of SB6/TN0 with no s/sx of aspiration and min cues.    Goal Outcome # 1 Goal met   Short Term Goal # 2 Pt will consume an EC7/TN0 diet with no s/sx of aspiration and min cues.          "

## 2021-11-16 NOTE — PROGRESS NOTES
"Hospital Medicine Daily Progress Note    Date of Service  11/16/2021    Chief Complaint  Stelal Prieto is a 66 y.o. female admitted 11/12/2021 with STEMI    Hospital Course  Per prior MD:  \"66 y.o. female who presented 11/12/2021 with history of COPD hypertension chronic respiratory failure with hypoxia on 2 L nasal cannula presented to [facility] with chest pain was noted to have ST elevation on her EKG and received TNKase.  She was started on heparin and transferred to our facility for cardiology evaluation.  She had an episode of left-sided weakness and confusion and concern for possible stroke.  She was evaluated by neurology.  CTA of the neck revealed carotid stenosis and it was felt that her symptoms were likely related to hypoperfusion as she was hypotensive in the setting of carotid stenosis.  Neurology recommended keeping  140 and proceeding with MRI of the brain when clinically stable.  The patient also had left lower lobe pneumonia she required intubation and was on pressors.  She had right ultrasound-guided thoracentesis on 11/14/2021 with 900 mL of fluid drained.  She tolerated extubation on 11/14/2021 and is currently on 3 L nasal cannula.  She denies any chest pain at the time of my examination.  She has a cough which is nonproductive.  She denies any nausea.  She denies any headache.  She has been weaned off pressors.  She is scheduled for cardiac catheterization and MRI later today.\"    Interval Problem Update  11/16: transferred out from ICU yesterday, assumed care by me today. s/p cath noting nonobstructive CAD. On optimized CAD medical management. PNA and COPD exacerbation on steroid and abx. D/c stress dose hydrocortisone, resume home prednisone. PT/OT eval.    Patient already has DME oxygen at home  PNA/COPD improving  MRI brain no CVA  Anticipate discharge tomorrow    I have personally seen and examined the patient at bedside. I discussed the plan of care with patient and " bedside RN.    Consultants/Specialty  ICU - signed off  Cardiology  Neurology    Code Status  Full Code    Disposition  Patient is not medically cleared.   Anticipate discharge to to home with close outpatient follow-up.  I have placed the appropriate orders for post-discharge needs.    Review of Systems  Review of Systems   Constitutional: Negative for chills and fever.   HENT: Negative for congestion and nosebleeds.    Eyes: Negative for blurred vision and double vision.   Respiratory: Positive for cough and shortness of breath.    Cardiovascular: Negative for chest pain and palpitations.   Gastrointestinal: Negative for abdominal pain, heartburn, nausea and vomiting.   Genitourinary: Negative for dysuria and flank pain.   Musculoskeletal: Negative for falls and myalgias.   Skin: Negative for itching and rash.   Neurological: Negative for dizziness, speech change and focal weakness.   Endo/Heme/Allergies: Negative for environmental allergies. Does not bruise/bleed easily.   Psychiatric/Behavioral: Negative for depression. The patient is not nervous/anxious.    All other systems reviewed and are negative.       Physical Exam  Temp:  [36.2 °C (97.1 °F)-36.6 °C (97.9 °F)] 36.6 °C (97.9 °F)  Pulse:  [] 70  Resp:  [18-20] 18  BP: (106-140)/(56-83) 115/57  SpO2:  [94 %-96 %] 94 %    Physical Exam  Vitals and nursing note reviewed.   Constitutional:       General: She is not in acute distress.  HENT:      Head: Normocephalic and atraumatic.      Nose: Nose normal.      Mouth/Throat:      Mouth: Mucous membranes are moist.      Pharynx: Oropharynx is clear.   Eyes:      General: No scleral icterus.     Extraocular Movements: Extraocular movements intact.   Cardiovascular:      Rate and Rhythm: Normal rate and regular rhythm.      Pulses: Normal pulses.      Heart sounds: No friction rub.   Pulmonary:      Effort: No respiratory distress.      Breath sounds: Wheezing and rales present.   Chest:      Chest wall: No  tenderness.   Abdominal:      General: There is no distension.      Palpations: Abdomen is soft.      Tenderness: There is no abdominal tenderness. There is no guarding.   Musculoskeletal:         General: No swelling or tenderness. Normal range of motion.      Cervical back: Neck supple. No tenderness.   Skin:     General: Skin is warm and dry.      Capillary Refill: Capillary refill takes less than 2 seconds.   Neurological:      General: No focal deficit present.      Mental Status: She is alert and oriented to person, place, and time.   Psychiatric:         Mood and Affect: Mood normal.         Fluids    Intake/Output Summary (Last 24 hours) at 11/16/2021 1356  Last data filed at 11/15/2021 2000  Gross per 24 hour   Intake 480 ml   Output 650 ml   Net -170 ml       Laboratory  Recent Labs     11/14/21  0305 11/15/21  0310 11/16/21  0530   WBC 35.2* 29.4* 22.0*   RBC 3.18* 3.09* 2.96*   HEMOGLOBIN 9.6* 9.3* 8.9*   HEMATOCRIT 29.3* 30.0* 29.1*   MCV 92.1 97.1 98.3*   MCH 30.2 30.1 30.1   MCHC 32.8* 31.0* 30.6*   RDW 48.8 51.4* 51.7*   PLATELETCT 456* 383 428   MPV 9.9 9.8 9.9     Recent Labs     11/14/21  0305 11/15/21  0310 11/16/21  0530   SODIUM 136 140 144   POTASSIUM 4.2 4.3 4.0   CHLORIDE 100 105 107   CO2 23 26 29   GLUCOSE 146* 104* 108*   BUN 58* 51* 34*   CREATININE 1.27 0.90 0.58   CALCIUM 8.4* 8.6 8.4*     Recent Labs     11/15/21  0950   INR 1.23*               Imaging  MR-BRAIN-W/O   Final Result         No acute intracranial process.      Scattered nonspecific T2 hyperintensities are noted in the deep white matter, most likely related to chronic microvascular ischemia.      EC-ECHOCARDIOGRAM COMPLETE W/O CONT   Final Result      US-CAROTID DOPPLER BILAT   Final Result      DX-ABDOMEN FOR TUBE PLACEMENT   Final Result      Feeding tube is noted with tip at the level of the gastric fundus. NG tube is noted with tip at the level of the distal esophagus.      DX-CHEST-PORTABLE (1 VIEW)   Final Result          No pneumothorax identified after right thoracentesis.      DX-CHEST-PORTABLE (1 VIEW)   Final Result         1. No significant interval change.      DX-CHEST-PORTABLE (1 VIEW)   Final Result         1.  Right pulmonary infiltrates and layering right pleural effusion, similar compared to prior study.      EC-ECHOCARDIOGRAM LTD W/O CONT   Final Result      CT-CEREBRAL PERFUSION ANALYSIS   Final Result      1.  Cerebral blood flow less than 30% likely representing completed infarct = 0 mL.      2.  T Max more than 6 seconds likely representing combination of completed infarct and ischemia = 0 mL.      3.  Mismatched volume likely representing ischemic brain/penumbra = None      4.  Please note that the cerebral perfusion was performed on the limited brain tissue around the basal ganglia region. Infarct/ischemia outside the CT perfusion sections can be missed in this study.      CT-CTA NECK WITH & W/O-POST PROCESSING   Final Result      1.  Due to technical reasons the study was only presented for my review on 11/14/2021.   2.  Moderate focal stenosis in the proximal RIGHT internal carotid artery.   3.  No dissection or occlusion of the cervical carotid or vertebral arteries.   4.  RIGHT pleural effusion.      CT-CTA HEAD WITH & W/O-POST PROCESS   Final Result      1.  Due to technical reasons the study was only presented for my review on 11/14/2021.   2.  No intracranial aneurysm, focal stenosis, or abrupt large vessel cut off.      DX-ABDOMEN FOR TUBE PLACEMENT   Final Result      NG tube tip projects over the stomach.      DX-CHEST-PORTABLE (1 VIEW)   Final Result      1.  No pneumothorax following right IJ central catheter placement. Well-positioned ETT and central line.   2.  Bibasilar atelectasis versus consolidation and associated small to moderate right pleural effusion.      EC-ECHOCARDIOGRAM LTD W/O CONT   Final Result      CT-HEAD W/O   Final Result    Limited examination.   1.  No acute intracranial  abnormality is identified.   2.  Mild left frontal scalp swelling.      DX-CHEST-LIMITED (1 VIEW)   Final Result      1.  Small right pleural effusion with overlying atelectasis/consolidation.   2.  Segmental left lower lobe atelectasis.   3.  Mild pulmonary vascular congestion.   4.  Mild cardiomegaly.      CL-LEFT HEART CATHETERIZATION WITH POSSIBLE INTERVENTION    (Results Pending)        Assessment/Plan  * ST elevation myocardial infarction involving left anterior descending (LAD) coronary artery (HCC)- (present on admission)  Assessment & Plan  ASA/statin.BB  s/p heparin drip  Cardiology following - cath 11/16 - nonobstructive CAD    Pleural effusion  Assessment & Plan  Status post thoracentesis on 11/14/2021  Exudative effusion likely parapneumonic    Follow-up on cultures and cytology  Continue antibiotics for pneumonia        Type 2 diabetes mellitus with hyperglycemia, without long-term current use of insulin ([facility])  Assessment & Plan  Recent Labs     11/13/21  1151 11/13/21  1735 11/13/21  2337 11/14/21  0628 11/14/21  1151 11/14/21  1825 11/15/21  0004 11/15/21  0640   POCGLUCOSE 135* 140* 147* 146* 128* 99 98 92        Continue sliding-scale insulin monitor    Thrombocytosis  Assessment & Plan  Likely reactive monitor CBC    Acute renal failure with tubular necrosis (HCC)  Assessment & Plan  Resolved  Monitor renal function electrolytes and avoid nephrotoxic agents    Acute left-sided weakness  Assessment & Plan  Patient evaluated by neurology symptoms felt to be related to hypertension in the setting of moderate carotid stenosis  Continue aspirin and statin  Follow-up on MRI of brain  PT OT  She will likely need further evaluation for carotid stenosiswhen more  clinically stable    Leukocytosis  Assessment & Plan  Likely reactive secondary to severe pneumonia  WBC count trending down continue to monitor if remains significantly elevated after pneumonia is resolved she may need further work-up with bone  marrow biopsy    Pneumonia due to infectious organism  Assessment & Plan  Ceftriaxone + Doxy    Altered level of consciousness  Assessment & Plan  Avoid sedating agents  Frequent orientation  Clinically improved    Acute respiratory failure with hypoxia and hypercapnia ([facility])  Assessment & Plan  Acute on chronic    Secondary to pneumonia  Patient tolerated extubation on 11/14/2021  Wean off as tolerated she reports that she uses 2 L at baseline    COPD (chronic obstructive pulmonary disease) ([facility])  Assessment & Plan  Bronchodilators per RT protocol  Resume Spiriva  Patient on stress dose hydrocortisone wean off to home dose of prednisone    Shock ([facility])  Assessment & Plan  Resolved patient weaned off pressors    Dyslipidemia  Assessment & Plan  Continue atorvastatin    Primary hypertension- (present on admission)  Assessment & Plan  Stable on metoprolol  Goal -140 given carotid stenosis with intermittent left hemiparesis       VTE prophylaxis: enoxaparin ppx    I have performed a physical exam and reviewed and updated ROS and Plan today (11/16/2021). In review of yesterday's note (11/15/2021), there are no changes except as documented above.

## 2021-11-16 NOTE — DISCHARGE PLANNING
Care Transition Team Assessment    Emergency contact is patient's daughter Esha Prieto at [phone]    Anticipated Discharge Disposition: Home    Action: RN CM met with the patient at bedside to go over CTT assessment.  Patient requested a AD packet to set her daughter up as power of .  RN CM provided the patient with an AD packet, a notary list, and a certificate of competency signed by Dr. Vizcaino.  Patient was unable to recall her PCP's name, but she is set up at the [facility].  Patient was previously on service with [facility] for home O2, patient agreed to send the nebulizer order to [facility].  Patient's only other DME at home is an old hospital bed.  Patient makes $850/month via disability.  Patient plan's to stay with her daughter Esha for about 1 month before returning home.  Esha stated that she will bring an O2 tank from the patient's home in anticipation of discharge tomorrow.  DME choice form faxed to [facility].    Daughter Esha's address:    [address] [place], [place] [id number]      Barriers to Discharge: Medical clearance, DME arrangements     Plan: Case coordination to continue to follow up with medical team to discuss discharge barriers.     Information Source  Orientation Level: Oriented X4  Information Given By: Patient  Who is responsible for making decisions for patient? : Patient    Readmission Evaluation  Is this a readmission?: No    Elopement Risk  Legal Hold: No  Ambulatory or Self Mobile in Wheelchair: Yes  Disoriented: No  Psychiatric Symptoms: None  History of Wandering: No  Elopement this Admit: No  Vocalizing Wanting to Leave: No  Displays Behaviors, Body Language Wanting to Leave: No-Not at Risk for Elopement  Elopement Risk: Not at Risk for Elopement    Interdisciplinary Discharge Planning  Prior Services: None    Discharge Preparedness  What is your plan after discharge?: Home with help  What are your discharge supports?: Child  Prior Functional Level:  Ambulatory,Independent with Activities of Daily Living,Independent with Medication Management  Difficulity with ADLs: None  Difficulity with IADLs: None    Functional Assesment  Prior Functional Level: Ambulatory,Independent with Activities of Daily Living,Independent with Medication Management    Finances  Financial Barriers to Discharge: Yes  Average Monthly Income: 830 $  Source of Income: Social Security Disability  Prescription Coverage: Yes    Advance Directive  Advance Directive?: None  Advance Directive offered?: AD Booklet given    Domestic Abuse  Have you ever been the victim of abuse or violence?: No    Psychological Assessment  History of Substance Abuse: None  History of Psychiatric Problems: No  Non-compliant with Treatment: No  Newly Diagnosed Illness: Yes    Discharge Risks or Barriers  Discharge risks or barriers?: Complex medical needs,Post-acute placement / services  Patient risk factors: Complex medical needs    Anticipated Discharge Information  Discharge Disposition: Discharged to home/self care (01)

## 2021-11-16 NOTE — PROGRESS NOTES
Report received from Mart Valente. Assumed pt care. Pt. Placed on tele box, monitor room notified. Pt A&O x 4. Fall precautions in place, call light and belongings within reach, bed in lowest position. No signs of distress.

## 2021-11-16 NOTE — DISCHARGE PLANNING
Received Choice form at [phone]  Agency/Facility Name: [facility]   Referral sent per Choice form @ [phone]

## 2021-11-16 NOTE — PROGRESS NOTES
Received bedside report from RN, pt care assumed, VSS, pt assessment complete. Pt AAOx4, with no pain at this time. No signs of acute distress noted at this time. Plan of care discussed with pt and verbalizes no questions. Pt denies any additional needs at this time. Bed locked/in lowest position, bed alarm on, pt educated on fall risk and verbalized understanding, call light within reach, hourly rounding initiated.     covid 19 surge crisis charting in effect

## 2021-11-17 ENCOUNTER — PATIENT OUTREACH (OUTPATIENT)
Dept: HEALTH INFORMATION MANAGEMENT | Facility: OTHER | Age: 66
End: 2021-11-17

## 2021-11-17 VITALS
HEIGHT: 63 IN | WEIGHT: 182.1 LBS | HEART RATE: 65 BPM | TEMPERATURE: 96.9 F | OXYGEN SATURATION: 91 % | BODY MASS INDEX: 32.27 KG/M2 | DIASTOLIC BLOOD PRESSURE: 57 MMHG | SYSTOLIC BLOOD PRESSURE: 111 MMHG | RESPIRATION RATE: 16 BRPM

## 2021-11-17 LAB
ALBUMIN SERPL BCP-MCNC: 2.8 G/DL (ref 3.2–4.9)
ANION GAP SERPL CALC-SCNC: 10 MMOL/L (ref 7–16)
ANISOCYTOSIS BLD QL SMEAR: ABNORMAL
BACTERIA FLD AEROBE CULT: NORMAL
BASOPHILS # BLD AUTO: 0 % (ref 0–1.8)
BASOPHILS # BLD: 0 K/UL (ref 0–0.12)
BUN SERPL-MCNC: 22 MG/DL (ref 8–22)
CALCIUM SERPL-MCNC: 8.5 MG/DL (ref 8.5–10.5)
CHLORIDE SERPL-SCNC: 103 MMOL/L (ref 96–112)
CO2 SERPL-SCNC: 26 MMOL/L (ref 20–33)
CREAT SERPL-MCNC: 0.51 MG/DL (ref 0.5–1.4)
EOSINOPHIL # BLD AUTO: 0 K/UL (ref 0–0.51)
EOSINOPHIL NFR BLD: 0 % (ref 0–6.9)
ERYTHROCYTE [DISTWIDTH] IN BLOOD BY AUTOMATED COUNT: 50.9 FL (ref 35.9–50)
GLUCOSE SERPL-MCNC: 100 MG/DL (ref 65–99)
GRAM STN SPEC: NORMAL
HCT VFR BLD AUTO: 30.1 % (ref 37–47)
HGB BLD-MCNC: 9.4 G/DL (ref 12–16)
LYMPHOCYTES # BLD AUTO: 0.91 K/UL (ref 1–4.8)
LYMPHOCYTES NFR BLD: 4.3 % (ref 22–41)
MACROCYTES BLD QL SMEAR: ABNORMAL
MAGNESIUM SERPL-MCNC: 1.9 MG/DL (ref 1.5–2.5)
MANUAL DIFF BLD: NORMAL
MCH RBC QN AUTO: 30.6 PG (ref 27–33)
MCHC RBC AUTO-ENTMCNC: 31.2 G/DL (ref 33.6–35)
MCV RBC AUTO: 98 FL (ref 81.4–97.8)
METAMYELOCYTES NFR BLD MANUAL: 1.7 %
MICROCYTES BLD QL SMEAR: ABNORMAL
MONOCYTES # BLD AUTO: 1.08 K/UL (ref 0–0.85)
MONOCYTES NFR BLD AUTO: 5.1 % (ref 0–13.4)
MORPHOLOGY BLD-IMP: NORMAL
MYELOCYTES NFR BLD MANUAL: 14.5 %
NEUTROPHILS # BLD AUTO: 15.7 K/UL (ref 2–7.15)
NEUTROPHILS NFR BLD: 72.7 % (ref 44–72)
NEUTS BAND NFR BLD MANUAL: 1.7 % (ref 0–10)
NRBC # BLD AUTO: 0 K/UL
NRBC BLD-RTO: 0 /100 WBC
PHOSPHATE SERPL-MCNC: 3 MG/DL (ref 2.5–4.5)
PLATELET # BLD AUTO: 454 K/UL (ref 164–446)
PLATELET BLD QL SMEAR: NORMAL
PMV BLD AUTO: 9.8 FL (ref 9–12.9)
POLYCHROMASIA BLD QL SMEAR: NORMAL
POTASSIUM SERPL-SCNC: 3.6 MMOL/L (ref 3.6–5.5)
RBC # BLD AUTO: 3.07 M/UL (ref 4.2–5.4)
RBC BLD AUTO: PRESENT
SIGNIFICANT IND 70042: NORMAL
SITE SITE: NORMAL
SODIUM SERPL-SCNC: 139 MMOL/L (ref 135–145)
SOURCE SOURCE: NORMAL
WBC # BLD AUTO: 21.1 K/UL (ref 4.8–10.8)

## 2021-11-17 PROCEDURE — A9270 NON-COVERED ITEM OR SERVICE: HCPCS | Performed by: STUDENT IN AN ORGANIZED HEALTH CARE EDUCATION/TRAINING PROGRAM

## 2021-11-17 PROCEDURE — 700111 HCHG RX REV CODE 636 W/ 250 OVERRIDE (IP): Performed by: STUDENT IN AN ORGANIZED HEALTH CARE EDUCATION/TRAINING PROGRAM

## 2021-11-17 PROCEDURE — 94640 AIRWAY INHALATION TREATMENT: CPT

## 2021-11-17 PROCEDURE — 700111 HCHG RX REV CODE 636 W/ 250 OVERRIDE (IP): Performed by: INTERNAL MEDICINE

## 2021-11-17 PROCEDURE — 36415 COLL VENOUS BLD VENIPUNCTURE: CPT

## 2021-11-17 PROCEDURE — 700102 HCHG RX REV CODE 250 W/ 637 OVERRIDE(OP): Performed by: HOSPITALIST

## 2021-11-17 PROCEDURE — 85027 COMPLETE CBC AUTOMATED: CPT

## 2021-11-17 PROCEDURE — 80069 RENAL FUNCTION PANEL: CPT

## 2021-11-17 PROCEDURE — 700102 HCHG RX REV CODE 250 W/ 637 OVERRIDE(OP): Performed by: NURSE PRACTITIONER

## 2021-11-17 PROCEDURE — A9270 NON-COVERED ITEM OR SERVICE: HCPCS | Performed by: INTERNAL MEDICINE

## 2021-11-17 PROCEDURE — 99239 HOSP IP/OBS DSCHRG MGMT >30: CPT | Performed by: STUDENT IN AN ORGANIZED HEALTH CARE EDUCATION/TRAINING PROGRAM

## 2021-11-17 PROCEDURE — A9270 NON-COVERED ITEM OR SERVICE: HCPCS | Performed by: NURSE PRACTITIONER

## 2021-11-17 PROCEDURE — 700111 HCHG RX REV CODE 636 W/ 250 OVERRIDE (IP): Performed by: HOSPITALIST

## 2021-11-17 PROCEDURE — 700105 HCHG RX REV CODE 258: Performed by: INTERNAL MEDICINE

## 2021-11-17 PROCEDURE — 83735 ASSAY OF MAGNESIUM: CPT

## 2021-11-17 PROCEDURE — 700102 HCHG RX REV CODE 250 W/ 637 OVERRIDE(OP): Performed by: STUDENT IN AN ORGANIZED HEALTH CARE EDUCATION/TRAINING PROGRAM

## 2021-11-17 PROCEDURE — 700102 HCHG RX REV CODE 250 W/ 637 OVERRIDE(OP): Performed by: INTERNAL MEDICINE

## 2021-11-17 PROCEDURE — 700101 HCHG RX REV CODE 250: Performed by: INTERNAL MEDICINE

## 2021-11-17 PROCEDURE — A9270 NON-COVERED ITEM OR SERVICE: HCPCS | Performed by: HOSPITALIST

## 2021-11-17 PROCEDURE — 85007 BL SMEAR W/DIFF WBC COUNT: CPT

## 2021-11-17 RX ORDER — ALBUTEROL SULFATE 90 UG/1
2 AEROSOL, METERED RESPIRATORY (INHALATION) EVERY 4 HOURS PRN
Status: DISCONTINUED | OUTPATIENT
Start: 2021-11-17 | End: 2021-11-17 | Stop reason: HOSPADM

## 2021-11-17 RX ORDER — ALBUTEROL SULFATE 90 UG/1
2 AEROSOL, METERED RESPIRATORY (INHALATION) EVERY 4 HOURS PRN
Qty: 8.5 G | Refills: 1 | Status: SHIPPED | OUTPATIENT
Start: 2021-11-17

## 2021-11-17 RX ORDER — DOXYCYCLINE 100 MG/1
100 TABLET ORAL EVERY 12 HOURS
Qty: 10 TABLET | Refills: 0 | Status: SHIPPED | OUTPATIENT
Start: 2021-11-17 | End: 2021-11-22

## 2021-11-17 RX ORDER — TRAMADOL HYDROCHLORIDE 50 MG/1
50 TABLET ORAL EVERY 6 HOURS PRN
Status: DISCONTINUED | OUTPATIENT
Start: 2021-11-17 | End: 2021-11-17 | Stop reason: HOSPADM

## 2021-11-17 RX ORDER — ASPIRIN 81 MG/1
81 TABLET, CHEWABLE ORAL DAILY
Qty: 60 TABLET | Refills: 0 | Status: SHIPPED | OUTPATIENT
Start: 2021-11-17

## 2021-11-17 RX ORDER — ATORVASTATIN CALCIUM 40 MG/1
40 TABLET, FILM COATED ORAL EVERY EVENING
Qty: 60 TABLET | Refills: 0 | Status: SHIPPED | OUTPATIENT
Start: 2021-11-17 | End: 2022-11-08 | Stop reason: SDUPTHER

## 2021-11-17 RX ORDER — PREDNISONE 20 MG/1
40 TABLET ORAL ONCE
Status: COMPLETED | OUTPATIENT
Start: 2021-11-17 | End: 2021-11-17

## 2021-11-17 RX ORDER — PREDNISONE 20 MG/1
20 TABLET ORAL DAILY
Qty: 5 TABLET | Refills: 0 | Status: ON HOLD | OUTPATIENT
Start: 2021-11-17 | End: 2022-10-20

## 2021-11-17 RX ORDER — AMOXICILLIN AND CLAVULANATE POTASSIUM 875; 125 MG/1; MG/1
1 TABLET, FILM COATED ORAL 2 TIMES DAILY
Qty: 14 TABLET | Refills: 0 | Status: SHIPPED | OUTPATIENT
Start: 2021-11-17 | End: 2021-11-24

## 2021-11-17 RX ADMIN — PREDNISONE 20 MG: 20 TABLET ORAL at 05:14

## 2021-11-17 RX ADMIN — TRAMADOL HYDROCHLORIDE 50 MG: 50 TABLET, COATED ORAL at 03:14

## 2021-11-17 RX ADMIN — TIOTROPIUM BROMIDE INHALATION SPRAY 5 MCG: 3.12 SPRAY, METERED RESPIRATORY (INHALATION) at 05:15

## 2021-11-17 RX ADMIN — IPRATROPIUM BROMIDE AND ALBUTEROL SULFATE 3 ML: .5; 2.5 SOLUTION RESPIRATORY (INHALATION) at 01:43

## 2021-11-17 RX ADMIN — SENNOSIDES AND DOCUSATE SODIUM 2 TABLET: 50; 8.6 TABLET ORAL at 09:12

## 2021-11-17 RX ADMIN — PREDNISONE 40 MG: 20 TABLET ORAL at 09:12

## 2021-11-17 RX ADMIN — METOPROLOL TARTRATE 12.5 MG: 25 TABLET, FILM COATED ORAL at 05:14

## 2021-11-17 RX ADMIN — BUPROPION HYDROCHLORIDE 150 MG: 100 TABLET, FILM COATED ORAL at 09:12

## 2021-11-17 RX ADMIN — ASPIRIN 81 MG: 81 TABLET, CHEWABLE ORAL at 09:12

## 2021-11-17 RX ADMIN — ALBUTEROL SULFATE 2 PUFF: 90 AEROSOL, METERED RESPIRATORY (INHALATION) at 03:15

## 2021-11-17 RX ADMIN — TRAMADOL HYDROCHLORIDE 50 MG: 50 TABLET, COATED ORAL at 09:18

## 2021-11-17 RX ADMIN — ESCITALOPRAM OXALATE 10 MG: 10 TABLET ORAL at 09:12

## 2021-11-17 RX ADMIN — DOXYCYCLINE 100 MG: 100 TABLET, FILM COATED ORAL at 09:13

## 2021-11-17 RX ADMIN — ALBUTEROL SULFATE 2 PUFF: 90 AEROSOL, METERED RESPIRATORY (INHALATION) at 00:28

## 2021-11-17 RX ADMIN — CEFTRIAXONE SODIUM 1 G: 1 INJECTION, POWDER, FOR SOLUTION INTRAMUSCULAR; INTRAVENOUS at 05:15

## 2021-11-17 ASSESSMENT — PAIN DESCRIPTION - PAIN TYPE
TYPE: ACUTE PAIN
TYPE: ACUTE PAIN

## 2021-11-17 NOTE — RESPIRATORY CARE
"COPD EDUCATION by COPD CLINICAL EDUCATOR  (Phone: [phone])  11/16/2021 at 4:51 PM by Nay Odonnell, KISHORE    Patient seen by Respiratory Education team to complete the final block of education.  This session discussed signs and symptoms of an exacerbation (flare-up), triggers that can create flare-ups, reiteration of the \"Action Plan\" to refer to daily which will help categorize their symptoms in order to utilize the appropriate therapy, breathing techniques used to treat acute symptoms, and oxygen safety. Smoking Cessation was discussed as appropriate to this patient. Question and answer session followed.    COPD Screen  COPD Risk Screening  Do you have a history of COPD?: Yes  Do you have a Pulmonologist?: No  COPD Population Screener  During the past 4 weeks, how much did you feel short of breath?: Some of the time  Do you ever cough up any mucus or phlegm?: Yes, a few days a week or month  In the past 12 months, you do less than you used to because of your breathing problems: Disagree/unsure  Have you smoked at least 100 cigarettes in your entire life?: Yes  How old are you?: 60+  COPD Screening Score: 6  COPD Coordinator Recommended: Yes    COPD Assessment  COPD Clinical Specialists ONLY  COPD Education Initiated: Yes--Full Intervention  COPD Education Session 1: Yes (Given COPD and Smoking Cessation literature, SVN, flutter, and spacer)  COPD Education Session 2: Yes (Pt didn't want to talk about smoking cessation, but did discuss not waiting to long to take action and use rescue meds when starting to have a little difficulty breathing)  DME Company: [employer]  DME Equipment Type: O2, nebulizer  Physician Follow Up Appointment:  (will self schedule)  Physician Name: can't remember name of PCP, but seen at [facility]  Pulmonary Follow Up Appointment:  (will self schedule)  Pulmonologist Name: given list of local pulmonary clinics to follow up with  Palliative Care:  (given AD packet by discharge " "planner)  Referrals Initiated: Yes  Pulmonary Rehab: Yes (given information, also encouraged cardiac rehab)  Smoking Cessation: Declined  $ Smoking Cessation 3-10 Minutes: Symptomatic (5 min)  Hospice: N/A  Home Health Care: N/A  [facility] Outreach: N/A  [facility]: N/A  [facility]: N/A  Private In-Home Care Agency: N/A  Is this a COPD exacerbation patient?: No  $ Demo/Eval of SVN's, MDI's and Aerosols: Yes (given spacer, flutter and SVN w/ instructions)  (OP) Pulmonary Function Testing: Yes (results unavailable)    Meds to Beds  Would the patient like to opt in for Bedside Medication Delivery at Discharge?: No     MY COPD ACTION PLAN     It is recommended that patients and physicians /healthcare providers complete this action plan together. This plan should be discussed at each physician visit and updated as needed.    The green, yellow and red zones show groups of symptoms of COPD. This list of symptoms is not comprehensive, and you may experience other symptoms. In the \"Actions\" column, your healthcare provider has recommended actions for you to take based on your symptoms.    Patient Name: Stella Prieto   YOB: 1955   Last Updated on:     Green Zone:  I am doing well today Actions   •  Usual activitiy and exercise level •  Take daily medications   •  Usual amounts of cough and phlegm/mucus •  Use oxygen as prescribed   •  Sleep well at night •  Continue regular exercise/diet plan   •  Appetite is good •  At all times avoid cigarette smoke, inhaled irritants     Daily Medications (these medications are taken every day):   Tiotropium Bromide Monohydrate (Spiriva)  Albuterol/Ipratropium (Combivent, Duoneb) 2 Puffs  1 Puff Once daily  Four Times daily     Additional Information:  Use either the Spiriva OR the Combivent, DO NOT USE AT THE SAME TIME.    *It would be best to decide which medication you prefer (cost, ease of use, effectiveness) then stick to that one.    Yellow Zone: " " I am having a bad day or a COPD flare Actions   •  More breathless than usual •  Continue daily medications   •  I have less energy for my daily activities •  Use quick relief inhaler as ordered   •  Increased or thicker phlegm/mucus •  Use oxygen as prescribed   •  Using quick relief inhaler/nebulizer more often •  Get plenty of rest   •  Swelling of ankles more than usual •  Use pursed lip breathing   •  More coughing than usual •  At all times avoid cigarette smoke, inhaled irritants   •  I feel like I have a \"chest cold\"   •  Poor sleep and my symptoms woke me up   •  My appetite is not good   •  My medicine is not helping    •  Call provider immediately if symptoms don’t improve     Continue daily medications, add rescue medications:   Albuterol  Albuterol 2 Puffs  2.5mg via nebulizer Every 4 hours PRN  Every 4 hours PRN       Medications to be used during a flare up, (as Discussed with Provider):           Additional Information:  Use the spacer with your rescue inhaler.    If really short of breath it's best to use the nebulizer.    If really congested use the Airobika flutter with the nebulizer for combined therapy.    Red Zone:  I need urgent medical care Actions   •  Severe shortness of breath even at rest •  Call 911 or seek medical care immediately   •  Not able to do any activity because of breathing    •  Fever or shaking chills    •  Feeling confused or very drowsy     •  Chest pains    •  Coughing up blood              "

## 2021-11-17 NOTE — PROGRESS NOTES
Assumed care pt from Ilir RN.PT A&O 4. Pt resting in bed with no signs of labored breathing. On 2L. Tele monitor in place, cardiac rhythm being monitored. Call light within reach, bed in lowest position, upper bed rails up. Pt was updated on plan of care. Agree with previous RN assessment.

## 2021-11-17 NOTE — DISCHARGE PLANNING
Anticipated Discharge Disposition: Home with HH and O2    Action: RN CM called [facility] to verify the patient's PCP.  Patient is established with the clinic, and she last saw Dr. Carter.      PATRIZIA JARAMILLO met with the patient at bedside to discuss HH and DME arrangements.  Patient gave the OK to send the 4 wheel walker order to [facility].  RN CM explained that [facility] is the only HH service provider for [place], patient consented to sending them a referral.  RN CM explained that [facility] has been experiencing staffing issues and that they may be currently full, patient verbalized understanding.  RN CM delivered the 2nd IMM to the patient, patient signed the IMM at 1000.  IMM and HH choice form faxed to [facility].    Barriers to Discharge: HH and DME arrangements     Plan: Case coordination to continue to follow up with medical team to discuss discharge barriers.

## 2021-11-17 NOTE — DISCHARGE PLANNING
Per RN CM request this DPA re-faxed DME order  Agency/Facility Name: [facility]  Referral sent per Choice form @ [phone]

## 2021-11-17 NOTE — DISCHARGE PLANNING
Agency/Facility Name: [facility] ([place], [place])   Spoke To: Reji   Outcome: Per Reji equipment is available. Reji stated the manager just needs to review the order and will give this DPA a call back.     RN CM notified.     1318  Agency/Facility Name: [facility] ([place], [place])  Spoke To: Fransisco  Outcome: Per Fransisco pts daughter has been contacted in regards to equipment and will drop by to  equipment.     RN CM notified.

## 2021-11-17 NOTE — FACE TO FACE
"Face to Face Note  -  Durable Medical Equipment    Avtar Vizcaino M.D. - NPI: [id number]  I certify that this patient is under my care and that they had a durable medical equipment(DME)face to face encounter by the clinical nurse specialist working collaboratively with me that meets the physician DME face-to-face encounter requirements with this patient on:    Date of encounter:   Patient:                    MRN:                       YOB: 2019  Stella Prieto  [id number]  1955     The encounter with the patient was in whole, or in part, for the following medical condition, which is the primary reason for durable medical equipment:  COPD    I certify that, based on my findings, the following durable medical equipment is medically necessary:  Nebulizer.    HOME O2 Saturation Measurements:(Values must be present for Home Oxygen orders)         ,     ,         My Clinical findings support the need for the above equipment due to:  Hypoxia    Supporting Symptoms: The patient requires supplemental oxygen, as the following interventions have been tried with limited or no improvement: \"Bronchodilators and/or steroid inhalers    If patient feels more short of breath, they can go up to 6 liters per minute and contact healthcare provider.  "
Face to Face Supporting Documentation - Home Health    The encounter with this patient was in whole or in part the primary reason for home health admission.    Date of encounter:   Patient:                    MRN:                       YOB: 2019  Stella Prieto  [id number]  1955     Home health to see patient for:  Skilled Nursing care for assessment, interventions & education, Home health aide, Physical Therapy evaluation and treatment and Occupational therapy evaluation and treatment    Skilled need for:  Comment: skilled nursing, PT/OT, HHA    Skilled nursing interventions to include:  Comment: skilled nursing, PT/OT, HHA    Homebound status evidenced by:  Have a condition such that leaving his or her home is medically contraindicated. Leaving home requires a considerable and taxing effort. There is a normal inability to leave the home.    Community Physician to provide follow up care: No primary care provider on file.     Optional Interventions? No      I certify the face to face encounter for this home health care referral meets the CMS requirements and the encounter/clinical assessment with the patient was, in whole, or in part, for the medical condition(s) listed above, which is the primary reason for home health care. Based on my clinical findings: the service(s) are medically necessary, support the need for home health care, and the homebound criteria are met.  I certify that this patient has had a face to face encounter by the clinical nurse specialist working collaboratively with me.  Avtar Vizcaino M.D. - NPI: [id number]    
98.2

## 2021-11-17 NOTE — DISCHARGE PLANNING
Anticipated Discharge Disposition: Home with O2    Action: Patient's DME orders have been accepted by [facility].  PATRIZIA JARAMILLO met with the patient and her daughter at bedside to discuss the discharge plan.  Patient's daughter Esha will reach out to [facility] to coordinate DME pickup.  Patient was provided for an outpatient prescription for PT/OT.  Patient was declined by [facility] due to staffing issues.  Patient has her O2 tank at bedside for the ride home.    Barriers to Discharge: None     Plan: Case coordination available to discuss any further discharge barriers.

## 2021-11-17 NOTE — CARE PLAN
The patient is Stable - Low risk of patient condition declining or worsening    Shift Goals  Clinical Goals: remove central line  Patient Goals: comfort  Family Goals: ciro    Progress made toward(s) clinical / shift goals:  Central line removed, pt up walking with PT, back to O2 baseline    Patient is not progressing towards the following goals:      Problem: Knowledge Deficit - Standard  Goal: Patient and family/care givers will demonstrate understanding of plan of care, disease process/condition, diagnostic tests and medications  Outcome: Progressing     Problem: Fall Risk  Goal: Patient will remain free from falls  Outcome: Progressing     Problem: Skin Integrity  Goal: Skin integrity is maintained or improved  Outcome: Progressing     Problem: Pain - Standard  Goal: Alleviation of pain or a reduction in pain to the patient’s comfort goal  Outcome: Progressing     Pt A&Ox4, VSS

## 2021-11-17 NOTE — CARE PLAN
Problem: Nutritional:  Goal: Nutrition support tolerated and meeting greater than 85% of estimated needs  Outcome: Discharged - Not Met     Pt is now on a level 7-easy to chew diet with level 0- thin liquids. PO intake variable but most recent intake was 50-75%. Pt with possible D/C soon. RD will follow weekly or PRN if D/C does not occur.

## 2021-11-17 NOTE — PROGRESS NOTES
MS    SR 61-78  [facility]  .15/.08/.41             PED DISCHARGE INSTRUCTIONS    Patient: Vicky Akhtar MRN: [id number]  SSN: [id number]    YOB: 2016  Age: 3 wk.o. Sex: male        Primary Diagnosis:   Problem List as of 2018  Never Reviewed          Codes Class Noted - Resolved    * (Principal)RSV bronchiolitis ICD-10-CM: J21.0  ICD-9-CM: 466.11  2018 - Present        Single liveborn, born in hospital, delivered by vaginal delivery ICD-10-CM: Z38.00  ICD-9-CM: V30.00  2018 - Present              Diet/Diet Restrictions: encourage plenty of fluids     Physical Activities/Restrictions/Safety: place your child on  His back to sleep    Discharge Instructions/Special Treatment/Home Care Needs:   Contact your physician for decreased wet diapers, fever > 100.4 rectally and increased work of breathing. Bronchiolitis Education for Parents    Bronchiolitis is a viral infection of both the upper respiratory tract (the nose and throat) and the lower respiratory tract (the lungs). It usually affects infants and children less than 3years of age. It usually starts out like a cold with runny nose, nasal congestion, and a cough. Children then develop difficulty breathing, rapid breathing, and/or wheezing. Children with bronchiolitis may also have a fever, vomiting, diarrhea, or decreased appetite. Because bronchiolitis is caused by a virus, antibiotics are not helpful. Sometimes doctors try medications used for asthma such as albuterol, but these are often not helpful either. There are things you can do to help your child be more comfortable:  1. ? Use a bulb syringe or Nose Aaron Bigger to help clear mucous from your child's nose. This is especially helpful before feeding and before sleep. You can also use nasal saline drops to help break up mucous prior to suctioning. 2. ? Encourage fluid intake. Infants may want to take smaller, more frequent feeds of breast milk or formula.   Older infants and young children may not eat very much food.  It is ok if your child does not feel like eating much solid food while they are sick as long as they continue to drink fluids and have wet diapers. 3. ? Give acetaminophen (Tylenol) and/or ibuprofen (Motrin, Advil) according to label instructions for fever or discomfort. Ibuprofen should not be given if your child is less than 10months of age. 4. ? Tobacco smoke is known to make the symptoms of bronchiolitis worse. Call 3-234[facility] ServicesQUITAp[facility] ServicesNOW or go to [employer] for help quitting smoking. If you are not ready to quit, smoke outside your home away from your children  Change your clothes and wash your hands after smoking. Bronchiolitis symptoms usually start to improve after about 4-5 days, though children often continue to cough for a couple of weeks after all other symptoms have resolved. Children at risk for more severe disease requiring hospitalization including those with a history of prematurity (born before 42 weeks of gestation), those with chronic illnesses (especially cardiac, pulmonary, or neurologic conditions), and infants younger than 1months of age. Most children with bronchiolitis can be cared for at home. However, sometimes children develop severe symptoms and need to be seen by a doctor right away. Call 471 or go to the nearest emergency room if:  ? Your child looks like they are using all of their energy to breathe. They cannot eat or play because they are working so hard to breathe. You may see their muscles pulling in above or below their rib cage, in their neck, and/or in their stomach, or flaring of their nostrils  ? Your child appears blue, grey, or stops breathing  ? Your child seems lethargic, confused, or is crying inconsolably. ? Your child is having a lot of vomiting or is otherwise unable to drink fluids. Signs of dehydration include no wet diapers for more than 6 hours or no tears when crying. ?  Your child develops a fever (temperature >100.4 degrees F) and is less than three months of age. Follow-up care is very important for children with bronchiolitis. Please bring your child to their usual primary care doctor within the next 48 hours so that they can be re-assessed and re-examined. Call your physician with any concerns or questions. Pain Management: Tylenol and soothing / swaddling    Asthma action plan was given to family: not applicable    Follow-up Care:   Appointment with: Pediatrician in  2-3 days    Signed By: Libby Rivera MD Time: 8:27 AM     Bronchiolitis in Children: Care Instructions  Your Care Instructions    Bronchiolitis is a common respiratory illness in babies and very young children. It happens when the bronchiole tubes that carry air to the lungs get inflamed. This can make your child cough or wheeze. It can start like a cold with a runny nose, congestion, and a cough. In many cases, there is a fever for a few days. The congestion can last a few weeks. The cough can last even longer. Most children feel better in 1 to 2 weeks. Bronchiolitis is caused by a virus. This means that antibiotics won't help it get better. Most of the time, you can take care of your child at home. But if your child is not getting better or has a hard time breathing, he or she may need to be in the hospital.  Follow-up care is a key part of your child's treatment and safety. Be sure to make and go to all appointments, and call your doctor if your child is having problems. It's also a good idea to know your child's test results and keep a list of the medicines your child takes. How can you care for your child at home? · Have your child drink a lot of fluids. · Give acetaminophen (Tylenol) or ibuprofen (Advil, Motrin) for fever. Be safe with medicines. Read and follow all instructions on the label. Do not give aspirin to anyone younger than 20. It has been linked to Reye syndrome, a serious illness.   · Do not give a child two or more pain medicines at the same time unless the doctor told you to. Many pain medicines have acetaminophen, which is Tylenol. Too much acetaminophen (Tylenol) can be harmful. · Keep your child away from other children while he or she is sick. · Wash your hands and your child's hands many times a day. You can also use hand gels or wipes that contain alcohol. This helps prevent spreading the virus to another person. When should you call for help? Call 911 anytime you think your child may need emergency care. For example, call if:  · Your child has severe trouble breathing. Signs may include the chest sinking in, using belly muscles to breathe, or nostrils flaring while your child is struggling to breathe. Call your doctor now or seek immediate medical care if:  · Your child has more breathing problems or is breathing faster. · You can see your child's skin around the ribs or the neck (or both) sink in deeply when he or she breathes in.  · Your child's breathing problems make it hard to eat or drink. · Your child's face, hands, and feet look a little gray or purple. · Your child has a new or higher fever. Watch closely for changes in your child's health, and be sure to contact your doctor if:  · Your child is not getting better as expected. Where can you learn more? Go to http://brigida-huyen.info/. Enter C797 in the search box to learn more about \"Bronchiolitis in Children: Care Instructions. \"  Current as of: May 12, 2017  Content Version: 11.4  © 7980-3324 Healthwise, [facility]. Care instructions adapted under license by [employer] (which disclaims liability or warranty for this information). If you have questions about a medical condition or this instruction, always ask your healthcare professional. [place] [address] any warranty or liability for your use of this information.

## 2021-11-17 NOTE — DISCHARGE SUMMARY
Discharge Summary    CHIEF COMPLAINT ON ADMISSION  Chief Complaint   Patient presents with   • Chest Pain     Patient transfered from [facility] for a STEMI. Patient was given TNK, Heparin, Nitro, ASA, PTA. Patient arrived on heparin drip. Patient also received Rocephin for PNA diagnosis       Reason for Admission  Possible Stemi     Admission Date  11/12/2021    CODE STATUS  Full Code    HPI & HOSPITAL COURSE  Ms. Prieto is a 66 y.o. female who presented 11/12/2021 as transfer from [facility] with STEMI. Her PMH includes COPD dependent on daily prednisone, chronic respiratory failure with hypoxia on 2 L nasal cannula, HTN. She initially presented to [facility] with chest pain was noted to have ST elevation on her EKG and received TNKase.  She was started on heparin and transferred to our facility for cardiology evaluation.  She had an episode of left-sided weakness and confusion and concern for possible stroke.  She was evaluated by neurology.  CTA of the neck revealed carotid stenosis and it was felt that her symptoms were likely related to hypoperfusion as she was hypotensive in the setting of carotid stenosis.  Neurology recommended keeping -140 and proceeding with MRI of the brain -- which was negative for acute CVA.  The patient also had left lower lobe pneumonia she required intubation and was on pressors.  She had right ultrasound-guided thoracentesis on 11/14/2021 with 900 mL of fluid drained.  She tolerated extubation on 11/14/2021. She was treated with IV antibiotic, stress-dosed steroid which was weaned off. HH and DME walker arranged prior to discharge.    Therefore, she is discharged in fair and stable condition to home with close outpatient follow-up.    The patient met 2-midnight criteria for an inpatient stay at the time of discharge.    Discharge Date  11/17/2021    FOLLOW UP ITEMS POST DISCHARGE  CAD, STEMI s/p cath noting non-obstructive CAD, stress-induced cardiomyopathy -- medical  management, outpt cardiology f/u    Right sided PNA s/p thoracocentesis with exudative fluid -- antibiotic as instructed    Acute COPD exacerbation    Acute on chronic hypoxemic RF s/p intubation and extubation    DISCHARGE DIAGNOSES  Principal Problem:    ST elevation myocardial infarction involving left anterior descending (LAD) coronary artery ([facility]) POA: Yes  Active Problems:    Primary hypertension (Chronic) POA: Yes    Dyslipidemia (Chronic) POA: Unknown    Shock ([facility]) POA: Unknown    COPD (chronic obstructive pulmonary disease) ([facility]) POA: Unknown    Acute respiratory failure with hypoxia and hypercapnia ([facility]) POA: Unknown    Altered level of consciousness POA: Unknown    Pneumonia due to infectious organism POA: Unknown    Leukocytosis POA: Unknown    Prolonged Q-T interval on ECG POA: Unknown    Acute left-sided weakness POA: Unknown    Acute renal failure with tubular necrosis ([facility]) POA: Unknown    Thrombocytosis POA: Unknown    Type 2 diabetes mellitus with hyperglycemia, without long-term current use of insulin ([facility]) POA: Unknown    Pleural effusion POA: Unknown  Resolved Problems:    Hyponatremia POA: Unknown      FOLLOW UP  No future appointments.  [address] [address] [phone]  In 1 week      [facility]  [address] [place] [address] [phone]  Call  Please call [facility] to schedule a hospital follow up appointment and to establish with a primary care provider. Thank you.      MEDICATIONS ON DISCHARGE     Medication List      START taking these medications      Instructions   amoxicillin-clavulanate 875-125 MG Tabs  Commonly known as: AUGMENTIN   Take 1 Tablet by mouth 2 times a day for 7 days.  Dose: 1 Tablet     aspirin 81 MG Chew chewable tablet  Commonly known as: ASA   Chew 1 Tablet every day.  Dose: 81 mg     atorvastatin 40 MG Tabs  Commonly known as: LIPITOR   Take 1 Tablet by mouth every  evening.  Dose: 40 mg     doxycycline monohydrate 100 MG tablet  Commonly known as: ADOXA   Take 1 Tablet by mouth every 12 hours for 5 days.  Dose: 100 mg     metoprolol tartrate 25 MG Tabs  Commonly known as: LOPRESSOR   Take 0.5 Tablets by mouth 2 times a day for 30 days.  Dose: 12.5 mg        CHANGE how you take these medications      Instructions   * albuterol 108 (90 Base) MCG/ACT Aers inhalation aerosol  What changed: Another medication with the same name was added. Make sure you understand how and when to take each.   Inhale 2 Puffs every 6 hours as needed for Shortness of Breath.  Dose: 2 Puff     * albuterol 108 (90 Base) MCG/ACT Aers inhalation aerosol  What changed: You were already taking a medication with the same name, and this prescription was added. Make sure you understand how and when to take each.   Inhale 2 Puffs every four hours as needed for Shortness of Breath.  Dose: 2 Puff     * predniSONE 20 MG Tabs  What changed: Another medication with the same name was added. Make sure you understand how and when to take each.  Commonly known as: DELTASONE   Take 20 mg by mouth every day.  Dose: 20 mg     * predniSONE 20 MG Tabs  What changed: You were already taking a medication with the same name, and this prescription was added. Make sure you understand how and when to take each.  Commonly known as: DELTASONE   Doctor's comments: Take 40mg for 3days, then to home dose of 20mg daily thereafter  Take 1 Tablet by mouth every day.  Dose: 20 mg         * This list has 4 medication(s) that are the same as other medications prescribed for you. Read the directions carefully, and ask your doctor or other care provider to review them with you.            CONTINUE taking these medications      Instructions   buPROPion 75 MG Tabs  Commonly known as: WELLBUTRIN   Take 150 mg by mouth every morning.  Dose: 150 mg     escitalopram 10 MG Tabs  Commonly known as: Lexapro   Take 10 mg by mouth every evening.  Dose: 10  mg     ipratropium-albuterol  MCG/ACT Aers  Commonly known as: COMBIVENT RESPIMAT   Inhale 1 Puff 4 times a day.  Dose: 1 Puff     LORazepam 0.5 MG Tabs  Commonly known as: ATIVAN   Take 0.25 mg by mouth every evening as needed for Anxiety.  Dose: 0.25 mg     tiotropium 2.5 mcg/Act Aers  Commonly known as: Spiriva Respimat   Inhale 5 mcg every day.  Dose: 5 mcg     topiramate 25 MG Tabs  Commonly known as: TOPAMAX   Take 75 mg by mouth every evening.  Dose: 75 mg     traZODone 100 MG Tabs  Commonly known as: DESYREL   Take 100 mg by mouth every evening as needed for Sleep.  Dose: 100 mg            Allergies  No Known Allergies    DIET  Orders Placed This Encounter   Procedures   • Diet Order Diet: Level 7 - Easy to Chew; Liquid level: Level 0 - Thin; Second Modifier: (optional): Cardiac     Standing Status:   Standing     Number of Occurrences:   1     Order Specific Question:   Diet:     Answer:   Level 7 - Easy to Chew [22]     Order Specific Question:   Liquid level     Answer:   Level 0 - Thin     Order Specific Question:   Second Modifier: (optional)     Answer:   Cardiac [6]       ACTIVITY  As tolerated.  Weight bearing as tolerated    CONSULTATIONS  ICU  Cardiology    PROCEDURES  Right sided thoracocentesis 11/14/2021  Intubation, extubation, central line placement, arterial line placement    Cardiac Catheterization Laboratory Procedure Note     DATE: 11/15/2021     : Valerio Kaminski MD  CORONARY ANGIOGRAPHY:  1. The left main coronary artery is patent and trifurcates into the left anterior descending, ramus intermedius, and left circumflex coronary arteries.  2. The left anterior descending coronary artery is a small vessel with calcified proximal luminal irregularities prior to trifurcating in the mid vessel into a small septal , a large first diagonal branch, and a small ongoing LAD that tapers and terminates at the apex and has luminal irregularities.   3. The ramus intermedius is  a moderate vessel with luminal irregularities.  4. The left circumflex coronary artery is a small, nondominant vessel that supplies one obtuse marginal branch and has no angiographically significant disease.  5. The right coronary artery is a large, dominant vessel with proximal 20% disease, mid 30% disease, and distal luminal irregularities.  Distally, it bifurcates into a large posterior descending coronary artery and a large posterolateral system with luminal irregularities.     IMPRESSION:  1. Mild nonobstructive coronary artery disease  2. Mildly elevated left heart filling pressures     RECOMMENDATIONS:  1. Return to floor with continued care per primary service  2. TR band release per protocol  3. Continue optimal medical management of suspected stress-induced cardiomyopathy        LABORATORY  Lab Results   Component Value Date    SODIUM 139 11/17/2021    POTASSIUM 3.6 11/17/2021    CHLORIDE 103 11/17/2021    CO2 26 11/17/2021    GLUCOSE 100 (H) 11/17/2021    BUN 22 11/17/2021    CREATININE 0.51 11/17/2021        Lab Results   Component Value Date    WBC 21.1 (H) 11/17/2021    HEMOGLOBIN 9.4 (L) 11/17/2021    HEMATOCRIT 30.1 (L) 11/17/2021    PLATELETCT 454 (H) 11/17/2021        Total time of the discharge process exceeds 40 minutes.

## 2021-11-17 NOTE — THERAPY
Physical Therapy   Initial Evaluation     Patient Name: Stella Prieto  Age:  66 y.o., Sex:  female  Medical Record #: [id number]  Today's Date: 11/16/2021     Precautions  Precautions: Swallow Precautions ( See Comments)    Assessment  Patient is 66 y.o. female who presented from outside hospital as STEMI transfer.  Patient initially presented to outside hospital c/o ~8 days of worsening chest pain.  Upon arrival to [facility] patient developed altered mental status and required intubation, now extubated.  Today patient was able to perform supine > sit with SPV and HOB elevated.  She was able to ambulate ~25 ft with FWW and CGA before onset of SOB and increased WOB.  Patient took seated rest break and was able to ambulate back to room.  Recommend 4WW as patient will likely require frequent rest breaks with ambulation upon DC.  Recommend continued acute PT and home health PT upon DC to progress ambulation, strength, and balance.    Plan    Recommend Physical Therapy 4 times per week until therapy goals are met for the following treatments:  Bed Mobility, Equipment, Gait Training, Neuro Re-Education / Balance, Self Care/Home Evaluation, Stair Training, Therapeutic Activities and Therapeutic Exercises    DC Equipment Recommendations: 4-Wheeled Walker  Discharge Recommendations: Recommend home health for continued physical therapy services       Objective     11/16/21 8572   Prior Living Situation   Prior Services Home-Independent   Housing / Facility 1 Story House   Steps Into Home 1   Equipment Owned None   Lives with - Patient's Self Care Capacity Adult Children   Comments Pt will be living with her daughter after DC.  Information provided is for daughter's house.   Prior Level of Functional Mobility   Bed Mobility Independent   Transfer Status Independent   Ambulation Independent   Distance Ambulation (Feet) (~100-200 ft)   Assistive Devices Used None   Stairs Independent   Cognition    Cognition / Consciousness WDL   Level of  Consciousness Alert   Comments Pleasant & cooperative   Active ROM Lower Body    Active ROM Lower Body  WDL   Strength Lower Body   Lower Body Strength  WDL   Comments B LE grossly 5/5   Sensation Lower Body   Lower Extremity Sensation   WDL   Comments Denies numbness/tingling   Balance Assessment   Sitting Balance (Static) Good   Sitting Balance (Dynamic) Good   Standing Balance (Static) Fair   Standing Balance (Dynamic) Fair -   Weight Shift Sitting Fair   Weight Shift Standing Fair   Comments w/ FWW   Gait Analysis   Gait Level Of Assist CGA   Assistive Device Front Wheel Walker   Distance (Feet) 25   # of Times Distance was Traveled 2   Deviation Decreased Base Of Support;Shuffled Gait;Bradykinetic   # of Stairs Climbed 0   Comments Increased SOB and WOB, required seated rest break   Bed Mobility    Supine to Sit Supervised   Sit to Supine (Up in chair post session)   Scooting Supervised   Comments HOB elevated   Functional Mobility   Sit to Stand Supervised   Bed, Chair, Wheelchair Transfer Supervised   Transfer Method Stand Step   Mobility supine > EOB > short ambulation > chair   Activity Tolerance   Sitting in Chair 10+ min (post session)   Sitting Edge of Bed 8 min   Standing 8 min    Short Term Goals    Short Term Goal # 1 Pt will ambulate 100 ft with FWW and SPV within 6 visits in order to progress toward PLOF   Short Term Goal # 2 Pt will negotiate 1 step with FWW and SPV within 6 visits in order to access home environment   Session Information   Date / Session Number  11/16 - 1 (1/4, 11/22)

## 2021-11-17 NOTE — THERAPY
"Occupational Therapy   Initial Evaluation     Patient Name: Setlla Prieto  Age:  66 y.o., Sex:  female  Medical Record #: [id number]  Today's Date: 11/16/2021       Precautions: Swallow Precautions ( See Comments)    Assessment  Patient is 66 y.o. female who presented 11/12/2021 with history of COPD hypertension chronic respiratory failure with hypoxia on 2 L nasal cannula presented to [facility] with chest pain was noted to have ST elevation on her EKG and received TNKase.  Today pt was able to perform basic ADL's with supervision.  Pt did have difficulty with her socks & shoes. Pt has no UE or LE weakness.  Pt & daughter advised to obtain a shower chair & sock aide for home use.   Pt's daughter plans to have her mom live with her until ready to return to independent living.  Anticipate that the patient will have no further occupational therapy needs after discharge from the hospital.     Plan    Recommend Occupational Therapy for Evaluation only.    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, I know how all that equipment works\"     Objective       11/16/21 1121   Prior Living Situation   Prior Services None   Housing / Facility 1 Story Apartment / Condo   Steps Into Home 1   Bathroom Set up Walk In Shower   Equipment Owned Oxygen   Lives with - Patient's Self Care Capacity Alone and Able to Care For Self   Comments Pt's daughter present throughout tx & stated she plans to take her mom home with her until pt can return home alone   Cognition    Comments Pt was pleasant, very verbose & easily distacted, pt required frequent redirection   Balance Assessment   Sitting Balance (Static) Good   Sitting Balance (Dynamic) Good   Standing Balance (Static) Good   Standing Balance (Dynamic) Fair + "   Weight Shift Sitting Good   Weight Shift Standing Good   Bed Mobility    Supine to Sit Modified Independent   Sit to Supine Modified Independent   Scooting Modified Independent   Rolling Modified Independent   ADL Assessment   Eating Modified Independent   Grooming Supervision;Standing   Upper Body Dressing Modified Independent   Lower Body Dressing Minimal Assist  (for socks & shoes only)   Toileting Supervision   Comments pt & daughter advised to purchase a shower chair & sock aid for home use   Functional Mobility   Sit to Stand Supervised   Bed, Chair, Wheelchair Transfer Supervised   Toilet Transfers Supervised

## 2021-11-17 NOTE — DISCHARGE INSTRUCTIONS
Discharge Instructions    Discharged to home by car with relative. Discharged via wheelchair, hospital escort: Yes.  Special equipment needed: Not Applicable    Be sure to schedule a follow-up appointment with your primary care doctor or any specialists as instructed.     Discharge Plan:   Diet Plan: Discussed  Activity Level: Discussed  Confirmed Follow up Appointment: Patient to Call and Schedule Appointment  Confirmed Symptoms Management: Discussed  Medication Reconciliation Updated: Yes    I understand that a diet low in cholesterol, fat, and sodium is recommended for good health. Unless I have been given specific instructions below for another diet, I accept this instruction as my diet prescription.   Other diet: Cardiac friendly    Special Instructions: DIVISION OF CARDIAC SURGERY DISCHARGE INSTRUCTIONS    Activity:    1. NO driving for 4 weeks after surgery. You may ride as a passenger.  2. NO lifting more than 10 pounds for 6 weeks.  3. For the next 6 weeks, keep your elbows close to your body and move within a pain-free motion when lifting, pushing or pulling.  Do not stretch both arms backwards at the same time.    4. Walk at least 4 times per day, there is no maximum.  5. Other physical activities (sex, housework, gardening, etc.) are okay after 4 weeks.  6. Continue using incentive spirometer for 2 weeks.  If you are going home on oxygen and you were not on oxygen prior to surgery, keep using until you are oxygen free.  7. Weigh yourself daily.  Call your Cardiologist for a weight gain of 2 or more pounds within 48 hours.  8. Take all of your medications as prescribed    Incision Care:    1. SHOWER EVERYDAY-no baths. Make sure to clean your incision(s) twice daily with plain, perfume and dye-free soap.  Then pat incision(s) dry with clean towel. No creams or lotions on your incision(s).    2. If you are discharging with a Prevena bandage on your chest, you or your home health nurse may remove the bandage  7 days after surgery, or sooner if the battery runs out or the device alarms. When the battery runs out, the bandage will lose suction and it will puff up.  To remove, slowly roll down the edges of the bandage. Throw away the entire bandage and the battery pack.  Keep the incision open to air and clean twice daily with soap and water.  3. If there is any increased redness or swelling, separation of the incision line, or thick drainage from any of your incisions, call the Cardiac Surgeons ([phone]).    4. Continue to wear your FLAVIO Stockings for 4 weeks. You may take them off when you are in bed or when your legs are elevated.    General Instructions:    1. You have been referred to Cardiac Rehab.  You can start Cardiac Rehab 30 days after surgery.  If you do not have an appointment at the time of discharge call [phone] to schedule an appointment.  2. Your Primary Care Doctor typically handles home oxygen. Oxygen may be stopped when your oxygen level is consistently greater than 90.  Check with your Primary Care Doctor if you are unsure.  3. Take all of your medications (including pain medications) as prescribed.  Taking medications other than prescribed can result in serious injury.    For Patients Discharged with Narcotic Pain Medication:     1. If a refill is needed, understand that only 1 refill will be provided and you must come to the Cardiac Surgeons’ office for an appointment (72 hours’ notice is required to schedule and there are no weekend appointments).  2. If the pain medications you are discharged on are not working, you will need to bring your remaining prescription into the office in order to receive a new prescription.  3. If you were taking narcotics prior to your heart surgery, the Cardiac Surgeons will provide you with one prescription and additional medications will need to be provided by your pain management doctor.  4. Do not drink alcohol while taking narcotics.  5. Lost or stolen  medications will not be refilled.  If medications are stolen, report to law enforcement.    Contact Cardiac Surgery at [phone] if you have any questions.    · Is patient discharged on Warfarin / Coumadin?   No     Depression / Suicide Risk    As you are discharged from this [facility] facility, it is important to learn how to keep safe from harming yourself.    Recognize the warning signs:  · Abrupt changes in personality, positive or negative- including increase in energy   · Giving away possessions  · Change in eating patterns- significant weight changes-  positive or negative  · Change in sleeping patterns- unable to sleep or sleeping all the time   · Unwillingness or inability to communicate  · Depression  · Unusual sadness, discouragement and loneliness  · Talk of wanting to die  · Neglect of personal appearance   · Rebelliousness- reckless behavior  · Withdrawal from people/activities they love  · Confusion- inability to concentrate     If you or a loved one observes any of these behaviors or has concerns about self-harm, here's what you can do:  · Talk about it- your feelings and reasons for harming yourself  · Remove any means that you might use to hurt yourself (examples: pills, rope, extension cords, firearm)  · Get professional help from the community (Mental Health, Substance Abuse, psychological counseling)  · Do not be alone:Call your Safe Contact- someone whom you trust who will be there for you.  · Call your local CRISIS HOTLINE 585-1430 or 895-686-4333  · Call your local Children's Mobile Crisis Response Team [place] (133) 676-8141 or www.ThoughtFocus  · Call the toll free National Suicide Prevention Hotlines   · National Suicide Prevention Lifeline 744-128-KMGJ (3780)  · National Hope Line Network 800-SUICIDE (114-6427)      Coronary Artery Disease, Female  Coronary artery disease (CAD) is a condition in which the arteries that lead to the heart (coronary arteries) become narrow  or blocked. The narrowing or blockage can lead to decreased blood flow to the heart. Prolonged reduced blood flow can cause a heart attack (myocardial infarction or MI). This condition may also be called coronary heart disease.  Because CAD is the leading cause of death in women, it is important to understand what causes this condition and how it is treated.  What are the causes?  CAD is most often caused by atherosclerosis. This is the buildup of fat and cholesterol (plaque) on the inside of the arteries. Over time, the plaque may narrow or block the artery, reducing blood flow to the heart. Plaque can also become weak and break off within a coronary artery and cause a sudden blockage. Other less common causes of CAD include:  · A blood clot or a piece of a blood clot or other substance that blocks the flow of blood in a coronary artery (embolism).  · A tearing of the artery (spontaneous coronary artery dissection).  · An enlargement of an artery (aneurysm).  · Inflammation (vasculitis) in the artery wall.  What increases the risk?  The following factors may make you more likely to develop this condition:  · Age. Women over age 55 are at a greater risk of CAD.  · Family history of CAD.  · High blood pressure (hypertension).  · Diabetes.  · High cholesterol levels.  · Tobacco use.  · Lack of exercise.  · Menopause.  ? All postmenopausal women are at greater risk of CAD.  ? Women who have experienced menopause between the ages of 40-45 (early menopause) are at a higher risk of CAD.  ? Women who have experienced menopause before age 40 (premature menopause) are at a very high risk of CAD.  · Excessive alcohol use.  · A diet high in saturated and trans fats, such as fried food and processed meat.  Other possible risk factors include:  · High stress levels.  · Depression.  · Obesity.  · Sleep apnea.  What are the signs or symptoms?  Many people do not have any symptoms during the early stages of CAD. As the condition  progresses, symptoms may include:  · Chest pain (angina). The pain can:  ? Feel like crushing or squeezing, or like a tightness, pressure, fullness, or heaviness in the chest.  ? Last more than a few minutes or can stop and recur. The pain tends to get worse with exercise or stress and to fade with rest.  · Pain in the arms, neck, jaw, ear, or back.  · Unexplained heartburn or indigestion.  · Shortness of breath.  · Nausea.  · Sudden cold sweats.  · Sudden light-headedness.  · Fluttering or fast heartbeat (palpitations).  Many women have chest discomfort and the other symptoms. However, women often have unusual (atypical) symptoms, such as:  · Fatigue.  · Vomiting.  · Unexplained feelings of nervousness or anxiety.  · Unexplained weakness.  · Dizziness or fainting.  How is this diagnosed?  This condition is diagnosed based on:  · Your family and medical history.  · A physical exam.  · Tests, including:  ? A test to check the electrical signals in your heart (electrocardiogram).  ? Exercise stress test. This looks for signs of blockage when the heart is stressed with exercise, such as running on a treadmill.  ? Pharmacologic stress test. This test looks for signs of blockage when the heart is being stressed with a medicine.  ? Blood tests.  ? Coronary angiogram. This is a procedure to look at the coronary arteries to see if there is any blockage. During this test, a dye is injected into your arteries so they appear on an X-ray.  ? Coronary artery CT scan. This CT scan helps detect calcium deposits in your coronary arteries. Calcium deposits are an indicator of CAD.  ? A test that uses sound waves to take a picture of your heart (echocardiogram).  ? Chest X-ray.  How is this treated?  This condition may be treated by:  · Healthy lifestyle changes to reduce risk factors.  · Medicines such as:  ? Antiplatelet medicines and blood-thinning medicines, such as aspirin. These help to prevent blood  clots.  ? Nitroglycerin.  ? Blood pressure medicines.  ? Cholesterol-lowering medicine.  · Coronary angioplasty and stenting. During this procedure, a thin, flexible tube is inserted through a blood vessel and into a blocked artery. A balloon or similar device on the end of the tube is inflated to open up the artery. In some cases, a small, mesh tube (stent) is inserted into the artery to keep it open.  · Coronary artery bypass surgery. During this surgery, veins or arteries from other parts of the body are used to create a bypass around the blockage and allow blood to reach your heart.  Follow these instructions at home:  Medicines  · Take over-the-counter and prescription medicines only as told by your health care provider.  · Do not take the following medicines unless your health care provider approves:  ? NSAIDs, such as ibuprofen, naproxen, or celecoxib.  ? Vitamin supplements that contain vitamin A, vitamin E, or both.  ? Hormone replacement therapy that contains estrogen with or without progestin.  Lifestyle  · Follow an exercise program approved by your health care provider. Aim for 150 minutes of moderate exercise or 75 minutes of vigorous exercise each week.  · Maintain a healthy weight or lose weight as approved by your health care provider.  · Learn to manage stress or try to limit your stress. Ask your health care provider for suggestions if you need help.  · Get screened for depression and seek treatment, if needed.  · Do not use any products that contain nicotine or tobacco, such as cigarettes, e-cigarettes, and chewing tobacco. If you need help quitting, ask your health care provider.  · Do not use illegal drugs.  Eating and drinking    · Follow a heart-healthy diet. A dietitian can help educate you about healthy food options and changes. In general, eat plenty of fruits and vegetables, lean meats, and whole grains.  · Avoid foods high in:  ? Sugar.  ? Salt (sodium).  ? Saturated fats, such as  processed or fatty meat.  ? Trans fats, such as fried food.  · Use healthy cooking methods such as roasting, grilling, broiling, baking, poaching, steaming, or stir-frying.  · Do not drink alcohol if:  ? Your health care provider tells you not to drink.  ? You are pregnant, may be pregnant, or are planning to become pregnant.  · If you drink alcohol:  ? Limit how much you have to 0-1 drink a day.  ? Be aware of how much alcohol is in your drink. In the [place] one drink equals one 12 oz bottle of beer (355 mL), one 5 oz glass of wine (148 mL), or one 1½ oz glass of hard liquor (44 mL).  General instructions  · Manage any other health conditions, such as hypertension and diabetes. These conditions affect your heart.  · Your health care provider may ask you to monitor your blood pressure. Ideally, your blood pressure should be below 130/80.  · Keep all follow-up visits as told by your health care provider. This is important.  Get help right away if:  · You have pain in your chest, neck, ear, arm, jaw, stomach, or back that:  ? Lasts more than a few minutes.  ? Is recurring.  ? Is not relieved by taking medicine under your tongue (sublingual nitroglycerin).  · You have profuse sweating without cause.  · You have unexplained:  ? Heartburn or indigestion.  ? Shortness of breath or difficulty breathing.  ? Fluttering or fast heartbeat (palpitations).  ? Nausea or vomiting.  ? Fatigue.  ? Feelings of nervousness or anxiety.  ? Weakness.  ? Diarrhea.  · You have sudden light-headedness or dizziness.  · You faint.  · You feel like hurting yourself or think about taking your own life.  These symptoms may represent a serious problem that is an emergency. Do not wait to see if the symptoms will go away. Get medical help right away. Call your local emergency services (911 in the [place] Do not drive yourself to the hospital.  Summary  · Coronary artery disease (CAD) is a condition in which the arteries that lead to the heart  (coronary arteries) become narrow or blocked. The narrowing or blockage can lead to a heart attack.  · Many women have chest discomfort and other common symptoms of CAD. However, women often have unusual (atypical) symptoms, such as fatigue, vomiting, weakness, or dizziness.  · CAD can be treated with lifestyle changes, medicines, surgery, or a combination of these treatments.  This information is not intended to replace advice given to you by your health care provider. Make sure you discuss any questions you have with your health care provider.  Document Released: 03/11/2013 Document Revised: 09/06/2019 Document Reviewed: 08/27/2019  [employer] Patient Education © 2020 [employer]      Community-Acquired Pneumonia, Adult  Pneumonia is an infection of the lungs. It causes swelling in the airways of the lungs. Mucus and fluid may also build up inside the airways.  One type of pneumonia can happen while a person is in a hospital. A different type can happen when a person is not in a hospital (community-acquired pneumonia).   What are the causes?    This condition is caused by germs (viruses, bacteria, or fungi). Some types of germs can be passed from one person to another. This can happen when you breathe in droplets from the cough or sneeze of an infected person.  What increases the risk?  You are more likely to develop this condition if you:  · Have a long-term (chronic) disease, such as:  ? Chronic obstructive pulmonary disease (COPD).  ? Asthma.  ? Cystic fibrosis.  ? Congestive heart failure.  ? Diabetes.  ? Kidney disease.  · Have HIV.  · Have sickle cell disease.  · Have had your spleen removed.  · Do not take good care of your teeth and mouth (poor dental hygiene).  · Have a medical condition that increases the risk of breathing in droplets from your own mouth and nose.  · Have a weakened body defense system (immune system).  · Are a smoker.  · Travel to areas where the germs that cause this illness are  common.  · Are around certain animals or the places they live.  What are the signs or symptoms?  · A dry cough.  · A wet (productive) cough.  · Fever.  · Sweating.  · Chest pain. This often happens when breathing deeply or coughing.  · Fast breathing or trouble breathing.  · Shortness of breath.  · Shaking chills.  · Feeling tired (fatigue).  · Muscle aches.  How is this treated?  Treatment for this condition depends on many things. Most adults can be treated at home. In some cases, treatment must happen in a hospital. Treatment may include:  · Medicines given by mouth or through an IV tube.  · Being given extra oxygen.  · Respiratory therapy.  In rare cases, treatment for very bad pneumonia may include:  · Using a machine to help you breathe.  · Having a procedure to remove fluid from around your lungs.  Follow these instructions at home:  Medicines  · Take over-the-counter and prescription medicines only as told by your doctor.  ? Only take cough medicine if you are losing sleep.  · If you were prescribed an antibiotic medicine, take it as told by your doctor. Do not stop taking the antibiotic even if you start to feel better.  General instructions    · Sleep with your head and neck raised (elevated). You can do this by sleeping in a recliner or by putting a few pillows under your head.  · Rest as needed. Get at least 8 hours of sleep each night.  · Drink enough water to keep your pee (urine) pale yellow.  · Eat a healthy diet that includes plenty of vegetables, fruits, whole grains, low-fat dairy products, and lean protein.  · Do not use any products that contain nicotine or tobacco. These include cigarettes, e-cigarettes, and chewing tobacco. If you need help quitting, ask your doctor.  · Keep all follow-up visits as told by your doctor. This is important.  How is this prevented?  A shot (vaccine) can help prevent pneumonia. Shots are often suggested for:  · People older than 65 years of age.  · People older  than 19 years of age who:  ? Are having cancer treatment.  ? Have long-term (chronic) lung disease.  ? Have problems with their body's defense system.  You may also prevent pneumonia if you take these actions:  · Get the flu (influenza) shot every year.  · Go to the dentist as often as told.  · Wash your hands often. If you cannot use soap and water, use hand .  Contact a doctor if:  · You have a fever.  · You lose sleep because your cough medicine does not help.  Get help right away if:  · You are short of breath and it gets worse.  · You have more chest pain.  · Your sickness gets worse. This is very serious if:  ? You are an older adult.  ? Your body's defense system is weak.  · You cough up blood.  Summary  · Pneumonia is an infection of the lungs.  · Most adults can be treated at home. Some will need treatment in a hospital.  · Drink enough water to keep your pee pale yellow.  · Get at least 8 hours of sleep each night.  This information is not intended to replace advice given to you by your health care provider. Make sure you discuss any questions you have with your health care provider.  Document Released: 06/05/2009 Document Revised: 04/08/2020 Document Reviewed: 08/15/2019  [employer] Patient Education © 2020 [employer]      Chronic Obstructive Pulmonary Disease  Chronic obstructive pulmonary disease (COPD) is a long-term (chronic) lung problem. When you have COPD, it is hard for air to get in and out of your lungs. Usually the condition gets worse over time, and your lungs will never return to normal. There are things you can do to keep yourself as healthy as possible.  · Your doctor may treat your condition with:  ? Medicines.  ? Oxygen.  ? Lung surgery.  · Your doctor may also recommend:  ? Rehabilitation. This includes steps to make your body work better. It may involve a team of specialists.  ? Quitting smoking, if you smoke.  ? Exercise and changes to your diet.  ? Comfort measures  (palliative care).  Follow these instructions at home:  Medicines  · Take over-the-counter and prescription medicines only as told by your doctor.  · Talk to your doctor before taking any cough or allergy medicines. You may need to avoid medicines that cause your lungs to be dry.  Lifestyle  · If you smoke, stop. Smoking makes the problem worse. If you need help quitting, ask your doctor.  · Avoid being around things that make your breathing worse. This may include smoke, chemicals, and fumes.  · Stay active, but remember to rest as well.  · Learn and use tips on how to relax.  · Make sure you get enough sleep. Most adults need at least 7 hours of sleep every night.  · Eat healthy foods. Eat smaller meals more often. Rest before meals.  Controlled breathing  Learn and use tips on how to control your breathing as told by your doctor. Try:  · Breathing in (inhaling) through your nose for 1 second. Then, pucker your lips and breath out (exhale) through your lips for 2 seconds.  · Putting one hand on your belly (abdomen). Breathe in slowly through your nose for 1 second. Your hand on your belly should move out. Pucker your lips and breathe out slowly through your lips. Your hand on your belly should move in as you breathe out.    Controlled coughing  Learn and use controlled coughing to clear mucus from your lungs. Follow these steps:  1. Lean your head a little forward.  2. Breathe in deeply.  3. Try to hold your breath for 3 seconds.  4. Keep your mouth slightly open while coughing 2 times.  5. Spit any mucus out into a tissue.  6. Rest and do the steps again 1 or 2 times as needed.  General instructions  · Make sure you get all the shots (vaccines) that your doctor recommends. Ask your doctor about a flu shot and a pneumonia shot.  · Use oxygen therapy and pulmonary rehabilitation if told by your doctor. If you need home oxygen therapy, ask your doctor if you should buy a tool to measure your oxygen level  (oximeter).  · Make a COPD action plan with your doctor. This helps you to know what to do if you feel worse than usual.  · Manage any other conditions you have as told by your doctor.  · Avoid going outside when it is very hot, cold, or humid.  · Avoid people who have a sickness you can catch (contagious).  · Keep all follow-up visits as told by your doctor. This is important.  Contact a doctor if:  · You cough up more mucus than usual.  · There is a change in the color or thickness of the mucus.  · It is harder to breathe than usual.  · Your breathing is faster than usual.  · You have trouble sleeping.  · You need to use your medicines more often than usual.  · You have trouble doing your normal activities such as getting dressed or walking around the house.  Get help right away if:  · You have shortness of breath while resting.  · You have shortness of breath that stops you from:  ? Being able to talk.  ? Doing normal activities.  · Your chest hurts for longer than 5 minutes.  · Your skin color is more blue than usual.  · Your pulse oximeter shows that you have low oxygen for longer than 5 minutes.  · You have a fever.  · You feel too tired to breathe normally.  Summary  · Chronic obstructive pulmonary disease (COPD) is a long-term lung problem.  · The way your lungs work will never return to normal. Usually the condition gets worse over time. There are things you can do to keep yourself as healthy as possible.  · Take over-the-counter and prescription medicines only as told by your doctor.  · If you smoke, stop. Smoking makes the problem worse.  This information is not intended to replace advice given to you by your health care provider. Make sure you discuss any questions you have with your health care provider.  Document Released: 06/05/2009 Document Revised: 11/30/2018 Document Reviewed: 01/22/2018  [employer] Patient Education © 2020 [employer]

## 2021-11-17 NOTE — DISCHARGE PLANNING
Received Choice form at [phone]  Agency/Facility Name: [facility]   Referral sent per Choice form @ [id number]

## 2021-11-17 NOTE — CARE PLAN
The patient is Stable - Low risk of patient condition declining or worsening    Shift Goals  Clinical Goals: remove central line  Patient Goals: comfort  Family Goals: ciro    Progress made toward(s) clinical / shift goals:    Problem: Knowledge Deficit - Standard  Goal: Patient and family/care givers will demonstrate understanding of plan of care, disease process/condition, diagnostic tests and medications  Outcome: Progressing     Problem: Fall Risk  Goal: Patient will remain free from falls  Outcome: Progressing     Problem: Skin Integrity  Goal: Skin integrity is maintained or improved  Outcome: Progressing     Problem: Pain - Standard  Goal: Alleviation of pain or a reduction in pain to the patient’s comfort goal  Outcome: Progressing

## 2021-11-18 LAB
BACTERIA BLD CULT: NORMAL
SIGNIFICANT IND 70042: NORMAL
SITE SITE: NORMAL
SOURCE SOURCE: NORMAL

## 2021-11-19 ENCOUNTER — TELEPHONE (OUTPATIENT)
Dept: RESPIRATORY THERAPY | Facility: MEDICAL CENTER | Age: 66
End: 2021-11-19

## 2021-11-19 LAB
BACTERIA BLD CULT: NORMAL
BACTERIA SPEC ANAEROBE CULT: NORMAL
SIGNIFICANT IND 70042: NORMAL
SIGNIFICANT IND 70042: NORMAL
SITE SITE: NORMAL
SITE SITE: NORMAL
SOURCE SOURCE: NORMAL
SOURCE SOURCE: NORMAL

## 2022-03-21 ENCOUNTER — TELEPHONE (OUTPATIENT)
Dept: RESPIRATORY THERAPY | Facility: MEDICAL CENTER | Age: 67
End: 2022-03-21

## 2022-05-18 ENCOUNTER — TELEPHONE (OUTPATIENT)
Dept: RESPIRATORY THERAPY | Facility: MEDICAL CENTER | Age: 67
End: 2022-05-18

## 2022-08-24 ENCOUNTER — TELEPHONE (OUTPATIENT)
Dept: RESPIRATORY THERAPY | Facility: MEDICAL CENTER | Age: 67
End: 2022-08-24

## 2022-10-17 ENCOUNTER — APPOINTMENT (OUTPATIENT)
Dept: RADIOLOGY | Facility: MEDICAL CENTER | Age: 67
DRG: 177 | End: 2022-10-17
Attending: INTERNAL MEDICINE
Payer: MEDICARE

## 2022-10-17 ENCOUNTER — HOSPITAL ENCOUNTER (INPATIENT)
Facility: MEDICAL CENTER | Age: 67
LOS: 3 days | DRG: 177 | End: 2022-10-20
Attending: INTERNAL MEDICINE | Admitting: INTERNAL MEDICINE
Payer: MEDICARE

## 2022-10-17 DIAGNOSIS — J44.1 ACUTE EXACERBATION OF CHRONIC OBSTRUCTIVE PULMONARY DISEASE (COPD) (HCC): ICD-10-CM

## 2022-10-17 DIAGNOSIS — D75.839 THROMBOCYTOSIS: ICD-10-CM

## 2022-10-17 DIAGNOSIS — F41.9 ANXIETY: ICD-10-CM

## 2022-10-17 DIAGNOSIS — I95.89 HYPOTENSION DUE TO HYPOVOLEMIA: ICD-10-CM

## 2022-10-17 DIAGNOSIS — E86.1 HYPOTENSION DUE TO HYPOVOLEMIA: ICD-10-CM

## 2022-10-17 DIAGNOSIS — J96.02 ACUTE RESPIRATORY FAILURE WITH HYPOXIA AND HYPERCAPNIA (HCC): ICD-10-CM

## 2022-10-17 DIAGNOSIS — U07.1 COVID-19 VIRUS INFECTION: ICD-10-CM

## 2022-10-17 DIAGNOSIS — J96.01 ACUTE RESPIRATORY FAILURE WITH HYPOXIA AND HYPERCAPNIA (HCC): ICD-10-CM

## 2022-10-17 DIAGNOSIS — N17.0 ACUTE RENAL FAILURE WITH TUBULAR NECROSIS (HCC): ICD-10-CM

## 2022-10-17 DIAGNOSIS — J90 PLEURAL EFFUSION: ICD-10-CM

## 2022-10-17 DIAGNOSIS — E11.65 TYPE 2 DIABETES MELLITUS WITH HYPERGLYCEMIA, WITHOUT LONG-TERM CURRENT USE OF INSULIN (HCC): ICD-10-CM

## 2022-10-17 DIAGNOSIS — E78.5 DYSLIPIDEMIA: Chronic | ICD-10-CM

## 2022-10-17 DIAGNOSIS — J44.9 CHRONIC OBSTRUCTIVE PULMONARY DISEASE, UNSPECIFIED COPD TYPE (HCC): ICD-10-CM

## 2022-10-17 DIAGNOSIS — I10 PRIMARY HYPERTENSION: Chronic | ICD-10-CM

## 2022-10-17 DIAGNOSIS — I21.02 ST ELEVATION MYOCARDIAL INFARCTION INVOLVING LEFT ANTERIOR DESCENDING (LAD) CORONARY ARTERY (HCC): ICD-10-CM

## 2022-10-17 DIAGNOSIS — R41.0 DISORIENTATION: ICD-10-CM

## 2022-10-17 DIAGNOSIS — F32.A DEPRESSION, UNSPECIFIED DEPRESSION TYPE: ICD-10-CM

## 2022-10-17 DIAGNOSIS — A41.9 SEPSIS, DUE TO UNSPECIFIED ORGANISM, UNSPECIFIED WHETHER ACUTE ORGAN DYSFUNCTION PRESENT (HCC): ICD-10-CM

## 2022-10-17 DIAGNOSIS — R94.31 PROLONGED Q-T INTERVAL ON ECG: ICD-10-CM

## 2022-10-17 DIAGNOSIS — R53.1 ACUTE LEFT-SIDED WEAKNESS: ICD-10-CM

## 2022-10-17 DIAGNOSIS — D64.9 NORMOCYTIC ANEMIA: ICD-10-CM

## 2022-10-17 DIAGNOSIS — R57.9 SHOCK (HCC): ICD-10-CM

## 2022-10-17 DIAGNOSIS — R40.4 ALTERED LEVEL OF CONSCIOUSNESS: ICD-10-CM

## 2022-10-17 DIAGNOSIS — G93.40 ACUTE ENCEPHALOPATHY: ICD-10-CM

## 2022-10-17 DIAGNOSIS — D72.829 LEUKOCYTOSIS, UNSPECIFIED TYPE: ICD-10-CM

## 2022-10-17 DIAGNOSIS — J18.9 PNEUMONIA OF RIGHT LOWER LOBE DUE TO INFECTIOUS ORGANISM: ICD-10-CM

## 2022-10-17 LAB
ALBUMIN SERPL BCP-MCNC: 3.6 G/DL (ref 3.2–4.9)
ALBUMIN/GLOB SERPL: 1.4 G/DL
ALP SERPL-CCNC: 60 U/L (ref 30–99)
ALT SERPL-CCNC: 20 U/L (ref 2–50)
ANION GAP SERPL CALC-SCNC: 8 MMOL/L (ref 7–16)
APPEARANCE UR: CLEAR
AST SERPL-CCNC: 14 U/L (ref 12–45)
BASE EXCESS BLDA CALC-SCNC: 4 MMOL/L (ref -4–3)
BASE EXCESS BLDA CALC-SCNC: 5 MMOL/L (ref -4–3)
BASOPHILS # BLD AUTO: 0.4 % (ref 0–1.8)
BASOPHILS # BLD: 0.06 K/UL (ref 0–0.12)
BILIRUB SERPL-MCNC: 0.2 MG/DL (ref 0.1–1.5)
BILIRUB UR QL STRIP.AUTO: ABNORMAL
BODY TEMPERATURE: ABNORMAL DEGREES
BODY TEMPERATURE: ABNORMAL DEGREES
BUN SERPL-MCNC: 24 MG/DL (ref 8–22)
CALCIUM SERPL-MCNC: 8 MG/DL (ref 8.4–10.2)
CHLORIDE SERPL-SCNC: 99 MMOL/L (ref 96–112)
CO2 BLDA-SCNC: 36 MMOL/L (ref 20–33)
CO2 BLDA-SCNC: 38 MMOL/L (ref 20–33)
CO2 SERPL-SCNC: 31 MMOL/L (ref 20–33)
COLOR UR: YELLOW
CREAT SERPL-MCNC: 0.84 MG/DL (ref 0.5–1.4)
CRP SERPL HS-MCNC: 1.6 MG/DL (ref 0–0.75)
D DIMER PPP IA.FEU-MCNC: 0.64 UG/ML (FEU) (ref 0–0.5)
EOSINOPHIL # BLD AUTO: 0 K/UL (ref 0–0.51)
EOSINOPHIL NFR BLD: 0 % (ref 0–6.9)
ERYTHROCYTE [DISTWIDTH] IN BLOOD BY AUTOMATED COUNT: 51.9 FL (ref 35.9–50)
GFR SERPLBLD CREATININE-BSD FMLA CKD-EPI: 77 ML/MIN/1.73 M 2
GLOBULIN SER CALC-MCNC: 2.6 G/DL (ref 1.9–3.5)
GLUCOSE BLD STRIP.AUTO-MCNC: 105 MG/DL (ref 65–99)
GLUCOSE BLD STRIP.AUTO-MCNC: 115 MG/DL (ref 65–99)
GLUCOSE BLD STRIP.AUTO-MCNC: 96 MG/DL (ref 65–99)
GLUCOSE SERPL-MCNC: 110 MG/DL (ref 65–99)
GLUCOSE UR STRIP.AUTO-MCNC: NEGATIVE MG/DL
HCO3 BLDA-SCNC: 34.3 MMOL/L (ref 17–25)
HCO3 BLDA-SCNC: 34.9 MMOL/L (ref 17–25)
HCT VFR BLD AUTO: 43 % (ref 37–47)
HGB BLD-MCNC: 13.5 G/DL (ref 12–16)
HOROWITZ INDEX BLDA+IHG-RTO: 188 MM[HG]
HOROWITZ INDEX BLDA+IHG-RTO: 270 MM[HG]
IMM GRANULOCYTES # BLD AUTO: 0.37 K/UL (ref 0–0.11)
IMM GRANULOCYTES NFR BLD AUTO: 2.2 % (ref 0–0.9)
INR PPP: 0.96 (ref 0.87–1.13)
KETONES UR STRIP.AUTO-MCNC: ABNORMAL MG/DL
LACTATE BLD-SCNC: 0.4 MMOL/L (ref 0.5–2)
LACTATE BLD-SCNC: 0.5 MMOL/L (ref 0.5–2)
LACTATE SERPL-SCNC: 1 MMOL/L (ref 0.5–2)
LEUKOCYTE ESTERASE UR QL STRIP.AUTO: NEGATIVE
LYMPHOCYTES # BLD AUTO: 0.8 K/UL (ref 1–4.8)
LYMPHOCYTES NFR BLD: 4.7 % (ref 22–41)
MCH RBC QN AUTO: 31.3 PG (ref 27–33)
MCHC RBC AUTO-ENTMCNC: 31.4 G/DL (ref 33.6–35)
MCV RBC AUTO: 99.8 FL (ref 81.4–97.8)
MICRO URNS: ABNORMAL
MONOCYTES # BLD AUTO: 0.17 K/UL (ref 0–0.85)
MONOCYTES NFR BLD AUTO: 1 % (ref 0–13.4)
NEUTROPHILS # BLD AUTO: 15.6 K/UL (ref 2–7.15)
NEUTROPHILS NFR BLD: 91.7 % (ref 44–72)
NITRITE UR QL STRIP.AUTO: NEGATIVE
NRBC # BLD AUTO: 0 K/UL
NRBC BLD-RTO: 0 /100 WBC
O2/TOTAL GAS SETTING VFR VENT: 40 %
O2/TOTAL GAS SETTING VFR VENT: 40 %
PCO2 BLDA: 73.3 MMHG (ref 26–37)
PCO2 BLDA: 89.3 MMHG (ref 26–37)
PH BLDA: 7.2 [PH] (ref 7.4–7.5)
PH BLDA: 7.28 [PH] (ref 7.4–7.5)
PH TEMP ADJ BLDA: 7.21 [PH] (ref 7.4–7.5)
PH TEMP ADJ BLDA: 7.28 [PH] (ref 7.4–7.5)
PH UR STRIP.AUTO: 6 [PH] (ref 5–8)
PLATELET # BLD AUTO: 320 K/UL (ref 164–446)
PMV BLD AUTO: 10.1 FL (ref 9–12.9)
PO2 BLDA: 108 MMHG (ref 64–87)
PO2 BLDA: 75 MMHG (ref 64–87)
PO2 TEMP ADJ BLDA: 106 MMHG (ref 64–87)
PO2 TEMP ADJ BLDA: 73 MMHG (ref 64–87)
POTASSIUM SERPL-SCNC: 4.4 MMOL/L (ref 3.6–5.5)
PROCALCITONIN SERPL-MCNC: 0.06 NG/ML
PROT SERPL-MCNC: 6.2 G/DL (ref 6–8.2)
PROT UR QL STRIP: NEGATIVE MG/DL
PROTHROMBIN TIME: 12.4 SEC (ref 12–14.6)
RBC # BLD AUTO: 4.31 M/UL (ref 4.2–5.4)
RBC UR QL AUTO: NEGATIVE
SAO2 % BLDA: 90 % (ref 93–99)
SAO2 % BLDA: 97 % (ref 93–99)
SODIUM SERPL-SCNC: 138 MMOL/L (ref 135–145)
SP GR UR STRIP.AUTO: >=1.03
SPECIMEN DRAWN FROM PATIENT: ABNORMAL
SPECIMEN DRAWN FROM PATIENT: ABNORMAL
WBC # BLD AUTO: 17 K/UL (ref 4.8–10.8)

## 2022-10-17 PROCEDURE — 82803 BLOOD GASES ANY COMBINATION: CPT

## 2022-10-17 PROCEDURE — 94660 CPAP INITIATION&MGMT: CPT

## 2022-10-17 PROCEDURE — 700105 HCHG RX REV CODE 258: Performed by: INTERNAL MEDICINE

## 2022-10-17 PROCEDURE — 700101 HCHG RX REV CODE 250: Performed by: INTERNAL MEDICINE

## 2022-10-17 PROCEDURE — 84145 PROCALCITONIN (PCT): CPT

## 2022-10-17 PROCEDURE — 83605 ASSAY OF LACTIC ACID: CPT

## 2022-10-17 PROCEDURE — 99291 CRITICAL CARE FIRST HOUR: CPT | Performed by: INTERNAL MEDICINE

## 2022-10-17 PROCEDURE — 86140 C-REACTIVE PROTEIN: CPT

## 2022-10-17 PROCEDURE — 85610 PROTHROMBIN TIME: CPT

## 2022-10-17 PROCEDURE — 94640 AIRWAY INHALATION TREATMENT: CPT

## 2022-10-17 PROCEDURE — 700102 HCHG RX REV CODE 250 W/ 637 OVERRIDE(OP): Performed by: INTERNAL MEDICINE

## 2022-10-17 PROCEDURE — 71045 X-RAY EXAM CHEST 1 VIEW: CPT

## 2022-10-17 PROCEDURE — 36600 WITHDRAWAL OF ARTERIAL BLOOD: CPT

## 2022-10-17 PROCEDURE — 80053 COMPREHEN METABOLIC PANEL: CPT

## 2022-10-17 PROCEDURE — 770022 HCHG ROOM/CARE - ICU (200)

## 2022-10-17 PROCEDURE — 85025 COMPLETE CBC W/AUTO DIFF WBC: CPT

## 2022-10-17 PROCEDURE — 81003 URINALYSIS AUTO W/O SCOPE: CPT

## 2022-10-17 PROCEDURE — 82962 GLUCOSE BLOOD TEST: CPT | Mod: 91

## 2022-10-17 PROCEDURE — 700111 HCHG RX REV CODE 636 W/ 250 OVERRIDE (IP): Performed by: INTERNAL MEDICINE

## 2022-10-17 PROCEDURE — 94760 N-INVAS EAR/PLS OXIMETRY 1: CPT

## 2022-10-17 PROCEDURE — 85379 FIBRIN DEGRADATION QUANT: CPT

## 2022-10-17 PROCEDURE — A9270 NON-COVERED ITEM OR SERVICE: HCPCS | Performed by: INTERNAL MEDICINE

## 2022-10-17 PROCEDURE — 700101 HCHG RX REV CODE 250

## 2022-10-17 PROCEDURE — 87040 BLOOD CULTURE FOR BACTERIA: CPT

## 2022-10-17 RX ORDER — ATORVASTATIN CALCIUM 20 MG/1
20 TABLET, FILM COATED ORAL DAILY
Status: DISCONTINUED | OUTPATIENT
Start: 2022-10-17 | End: 2022-10-20 | Stop reason: HOSPADM

## 2022-10-17 RX ORDER — METHYLPREDNISOLONE SODIUM SUCCINATE 40 MG/ML
40 INJECTION, POWDER, LYOPHILIZED, FOR SOLUTION INTRAMUSCULAR; INTRAVENOUS DAILY
Status: DISCONTINUED | OUTPATIENT
Start: 2022-10-17 | End: 2022-10-20

## 2022-10-17 RX ORDER — ESCITALOPRAM OXALATE 10 MG/1
10 TABLET ORAL EVERY EVENING
Status: DISCONTINUED | OUTPATIENT
Start: 2022-10-17 | End: 2022-10-20 | Stop reason: HOSPADM

## 2022-10-17 RX ORDER — SODIUM CHLORIDE 9 MG/ML
INJECTION, SOLUTION INTRAVENOUS CONTINUOUS
Status: DISCONTINUED | OUTPATIENT
Start: 2022-10-17 | End: 2022-10-18

## 2022-10-17 RX ORDER — BUPROPION HYDROCHLORIDE 75 MG/1
150 TABLET ORAL EVERY MORNING
Status: DISCONTINUED | OUTPATIENT
Start: 2022-10-17 | End: 2022-10-20 | Stop reason: HOSPADM

## 2022-10-17 RX ORDER — IPRATROPIUM BROMIDE AND ALBUTEROL SULFATE 2.5; .5 MG/3ML; MG/3ML
3 SOLUTION RESPIRATORY (INHALATION)
Status: DISCONTINUED | OUTPATIENT
Start: 2022-10-17 | End: 2022-10-18

## 2022-10-17 RX ORDER — ENOXAPARIN SODIUM 100 MG/ML
40 INJECTION SUBCUTANEOUS DAILY
Status: DISCONTINUED | OUTPATIENT
Start: 2022-10-17 | End: 2022-10-20 | Stop reason: HOSPADM

## 2022-10-17 RX ORDER — NOREPINEPHRINE BITARTRATE 0.03 MG/ML
INJECTION, SOLUTION INTRAVENOUS
Status: COMPLETED
Start: 2022-10-17 | End: 2022-10-17

## 2022-10-17 RX ORDER — ONDANSETRON 2 MG/ML
4 INJECTION INTRAMUSCULAR; INTRAVENOUS EVERY 6 HOURS PRN
Status: DISCONTINUED | OUTPATIENT
Start: 2022-10-17 | End: 2022-10-20 | Stop reason: HOSPADM

## 2022-10-17 RX ORDER — MIRTAZAPINE 30 MG/1
30 TABLET, FILM COATED ORAL NIGHTLY
COMMUNITY

## 2022-10-17 RX ORDER — LABETALOL HYDROCHLORIDE 5 MG/ML
10 INJECTION, SOLUTION INTRAVENOUS EVERY 4 HOURS PRN
Status: DISCONTINUED | OUTPATIENT
Start: 2022-10-17 | End: 2022-10-20 | Stop reason: HOSPADM

## 2022-10-17 RX ORDER — DEXMEDETOMIDINE HYDROCHLORIDE 4 UG/ML
.1-1.5 INJECTION, SOLUTION INTRAVENOUS CONTINUOUS
Status: DISCONTINUED | OUTPATIENT
Start: 2022-10-17 | End: 2022-10-18

## 2022-10-17 RX ORDER — NOREPINEPHRINE BITARTRATE 0.03 MG/ML
0-30 INJECTION, SOLUTION INTRAVENOUS CONTINUOUS
Status: DISCONTINUED | OUTPATIENT
Start: 2022-10-17 | End: 2022-10-18

## 2022-10-17 RX ORDER — ONDANSETRON 4 MG/1
4 TABLET, ORALLY DISINTEGRATING ORAL EVERY 6 HOURS PRN
Status: DISCONTINUED | OUTPATIENT
Start: 2022-10-17 | End: 2022-10-20 | Stop reason: HOSPADM

## 2022-10-17 RX ORDER — POLYETHYLENE GLYCOL 3350 17 G/17G
1 POWDER, FOR SOLUTION ORAL
Status: DISCONTINUED | OUTPATIENT
Start: 2022-10-17 | End: 2022-10-20 | Stop reason: HOSPADM

## 2022-10-17 RX ORDER — AZITHROMYCIN 250 MG/1
500 TABLET, FILM COATED ORAL DAILY
Status: DISCONTINUED | OUTPATIENT
Start: 2022-10-17 | End: 2022-10-17

## 2022-10-17 RX ORDER — SODIUM CHLORIDE, SODIUM LACTATE, POTASSIUM CHLORIDE, AND CALCIUM CHLORIDE .6; .31; .03; .02 G/100ML; G/100ML; G/100ML; G/100ML
1000 INJECTION, SOLUTION INTRAVENOUS
Status: DISCONTINUED | OUTPATIENT
Start: 2022-10-17 | End: 2022-10-20 | Stop reason: HOSPADM

## 2022-10-17 RX ORDER — ACETAMINOPHEN 325 MG/1
650 TABLET ORAL EVERY 6 HOURS PRN
Status: DISCONTINUED | OUTPATIENT
Start: 2022-10-17 | End: 2022-10-18

## 2022-10-17 RX ORDER — BISACODYL 10 MG
10 SUPPOSITORY, RECTAL RECTAL
Status: DISCONTINUED | OUTPATIENT
Start: 2022-10-17 | End: 2022-10-20 | Stop reason: HOSPADM

## 2022-10-17 RX ORDER — MIRTAZAPINE 15 MG/1
30 TABLET, ORALLY DISINTEGRATING ORAL NIGHTLY
Status: DISCONTINUED | OUTPATIENT
Start: 2022-10-17 | End: 2022-10-20 | Stop reason: HOSPADM

## 2022-10-17 RX ORDER — DEXTROSE MONOHYDRATE 25 G/50ML
25 INJECTION, SOLUTION INTRAVENOUS
Status: DISCONTINUED | OUTPATIENT
Start: 2022-10-17 | End: 2022-10-20 | Stop reason: HOSPADM

## 2022-10-17 RX ORDER — AMOXICILLIN 250 MG
2 CAPSULE ORAL 2 TIMES DAILY
Status: DISCONTINUED | OUTPATIENT
Start: 2022-10-17 | End: 2022-10-20 | Stop reason: HOSPADM

## 2022-10-17 RX ADMIN — ACETAMINOPHEN 650 MG: 325 TABLET, FILM COATED ORAL at 13:46

## 2022-10-17 RX ADMIN — ESCITALOPRAM OXALATE 10 MG: 10 TABLET ORAL at 17:23

## 2022-10-17 RX ADMIN — METHYLPREDNISOLONE SODIUM SUCCINATE 40 MG: 40 INJECTION, POWDER, FOR SOLUTION INTRAMUSCULAR; INTRAVENOUS at 05:54

## 2022-10-17 RX ADMIN — IPRATROPIUM BROMIDE AND ALBUTEROL SULFATE 3 ML: 2.5; .5 SOLUTION RESPIRATORY (INHALATION) at 22:40

## 2022-10-17 RX ADMIN — SODIUM CHLORIDE: 9 INJECTION, SOLUTION INTRAVENOUS at 16:47

## 2022-10-17 RX ADMIN — NOREPINEPHRINE BITARTRATE 5 MCG/MIN: 1 INJECTION INTRAVENOUS at 15:53

## 2022-10-17 RX ADMIN — ENOXAPARIN SODIUM 40 MG: 100 INJECTION SUBCUTANEOUS at 17:23

## 2022-10-17 RX ADMIN — NOREPINEPHRINE BITARTRATE 5 MCG/MIN: 0.03 INJECTION, SOLUTION INTRAVENOUS at 15:53

## 2022-10-17 RX ADMIN — SODIUM CHLORIDE 3 G: 900 INJECTION INTRAVENOUS at 05:54

## 2022-10-17 RX ADMIN — IPRATROPIUM BROMIDE AND ALBUTEROL SULFATE 3 ML: 2.5; .5 SOLUTION RESPIRATORY (INHALATION) at 09:54

## 2022-10-17 RX ADMIN — DEXMEDETOMIDINE HYDROCHLORIDE 0.2 MCG/KG/HR: 4 INJECTION, SOLUTION INTRAVENOUS at 10:27

## 2022-10-17 RX ADMIN — IPRATROPIUM BROMIDE AND ALBUTEROL SULFATE 3 ML: 2.5; .5 SOLUTION RESPIRATORY (INHALATION) at 19:07

## 2022-10-17 RX ADMIN — SENNOSIDES AND DOCUSATE SODIUM 2 TABLET: 50; 8.6 TABLET ORAL at 17:23

## 2022-10-17 RX ADMIN — IPRATROPIUM BROMIDE AND ALBUTEROL SULFATE 3 ML: 2.5; .5 SOLUTION RESPIRATORY (INHALATION) at 05:34

## 2022-10-17 RX ADMIN — DEXMEDETOMIDINE HYDROCHLORIDE 0.4 MCG/KG/HR: 4 INJECTION, SOLUTION INTRAVENOUS at 21:22

## 2022-10-17 ASSESSMENT — PAIN DESCRIPTION - PAIN TYPE
TYPE: ACUTE PAIN

## 2022-10-17 ASSESSMENT — LIFESTYLE VARIABLES
ALCOHOL_USE: NO
TOTAL SCORE: 0
TOTAL SCORE: 0
CONSUMPTION TOTAL: NEGATIVE
EVER HAD A DRINK FIRST THING IN THE MORNING TO STEADY YOUR NERVES TO GET RID OF A HANGOVER: NO
AVERAGE NUMBER OF DAYS PER WEEK YOU HAVE A DRINK CONTAINING ALCOHOL: 0
HAVE YOU EVER FELT YOU SHOULD CUT DOWN ON YOUR DRINKING: NO
TOTAL SCORE: 0
EVER FELT BAD OR GUILTY ABOUT YOUR DRINKING: NO
HAVE PEOPLE ANNOYED YOU BY CRITICIZING YOUR DRINKING: NO
HOW MANY TIMES IN THE PAST YEAR HAVE YOU HAD 5 OR MORE DRINKS IN A DAY: 0
ON A TYPICAL DAY WHEN YOU DRINK ALCOHOL HOW MANY DRINKS DO YOU HAVE: 0

## 2022-10-17 ASSESSMENT — COGNITIVE AND FUNCTIONAL STATUS - GENERAL
MOVING FROM LYING ON BACK TO SITTING ON SIDE OF FLAT BED: A LOT
MOBILITY SCORE: 13
STANDING UP FROM CHAIR USING ARMS: A LOT
CLIMB 3 TO 5 STEPS WITH RAILING: A LOT
DAILY ACTIVITIY SCORE: 17
TOILETING: A LOT
SUGGESTED CMS G CODE MODIFIER MOBILITY: CL
HELP NEEDED FOR BATHING: A LOT
TURNING FROM BACK TO SIDE WHILE IN FLAT BAD: A LITTLE
WALKING IN HOSPITAL ROOM: A LOT
SUGGESTED CMS G CODE MODIFIER DAILY ACTIVITY: CK
MOVING TO AND FROM BED TO CHAIR: A LOT
DRESSING REGULAR UPPER BODY CLOTHING: A LITTLE
DRESSING REGULAR LOWER BODY CLOTHING: A LOT

## 2022-10-17 ASSESSMENT — PULMONARY FUNCTION TESTS
EPAP_CMH2O: 6

## 2022-10-17 ASSESSMENT — ENCOUNTER SYMPTOMS
WEAKNESS: 1
NERVOUS/ANXIOUS: 1
SHORTNESS OF BREATH: 1
EYES NEGATIVE: 1
GASTROINTESTINAL NEGATIVE: 1
MUSCULOSKELETAL NEGATIVE: 1
CARDIOVASCULAR NEGATIVE: 1

## 2022-10-17 ASSESSMENT — PATIENT HEALTH QUESTIONNAIRE - PHQ9
2. FEELING DOWN, DEPRESSED, IRRITABLE, OR HOPELESS: NOT AT ALL
1. LITTLE INTEREST OR PLEASURE IN DOING THINGS: NOT AT ALL
SUM OF ALL RESPONSES TO PHQ9 QUESTIONS 1 AND 2: 0

## 2022-10-17 ASSESSMENT — FIBROSIS 4 INDEX: FIB4 SCORE: 0.48

## 2022-10-17 NOTE — LETTER
October 20, 2022    To Whom It May Concern:         This is confirmation that Stella Thurman, date of birth 1955, was under my care from 10/17/2022 until 10/20/2022 for medical illness at [facility].  Please excuse her daughter Gardenia Sr on 10/20/2022 from work for picking up her mother and bring her home from the hospital.  Thank       If you have any questions please do not hesitate to call me at the phone number listed below.    Sincerely,          Estephanie Elizalde M.D.  [phone]

## 2022-10-17 NOTE — ASSESSMENT & PLAN NOTE
Patient was able to be weaned off the high flow by nasal cannula and at this point she is down to 4 L by nasal cannula.

## 2022-10-17 NOTE — PROGRESS NOTES
[facility] INTENSIVIST DIRECT ADMISSION REPORT    Transferring facility: [facility]  Transferring physician: VIKTORIYA Garcia  Transferring facility/physician contact number: n/a    Chief complaint: Worsening shortness of breath for 3 days    Pertinent history & patient course: Ms. Prieto is a 67 year old lady with the past medical history of COPD who presented to the ER in [place], [place] with complaints of worsening shortness of breath for the past 3 days.  The patient chronically takes 50mg of prednisone daily.  EMS was activated and on arrival they noted they she was in distress and was given an albuterol treatment.  In the ER, she was given a Duoneb treatment and then starting BiPAP at 12/6 at 50% with improvement to her work of breathing, hemodynamics, and mentation.  She was given Azithromycin and Ceftriaxone.  Her CXR showed hyperinflation, but no infiltrates or opacities.  She was COVID (+).  She was given solumedrol 125mg IV.    Pertinent imaging & lab results: CXR with hyperinflation but no signs of COVID pneumonia or lobar pneumonia, WBCs slightly elevated, and troponin at 0.09, ABG on BiPAP: 7.28/66/96    Code Status: FULL CODE per transferring provider, I personally verified with the transferring provider patient's code status and the transferring provider has confirmed this with the patient.  Further work up or recommendations prior to transfer: asked PA to turn FiO2 down on the BiPAP with O2 saturation goals > 88%  Consultants called prior to transfer and pertinent input from consultants: ICU  Patient accepted for transfer: yes, going to [facility]  Consultants to be called upon arrival: ICU, hospitalist to admit  Floor requested: [facility] ICU  ADT order placed for accepted patient: yes    Please inform the Intensivist upon assignment of an ICU bed and then again on arrival of the patient.

## 2022-10-17 NOTE — FLOWSHEET NOTE
Pt received via transport EMS unit from outside facility on active BiPAP with the following readings and parameters. Will continue to monitor.     10/17/22 0346   Events/Summary/Plan   Events/Summary/Plan BiPAP Start   Skin Inspection Respiratory Device Intact   Ventilator Management Group   Intensivist Group Yes   Adult IBW/VT Calculations   Height 1.524 m (5')   IBW/kg (Calculated)  45.5   Low Range Vt 6mL/kg  273 mL/kg   Adult Moderate Range Vt 8mL/kg  364 mL/kg   Adult High Range Vt 10mL/kg 455 mL/kg   Vent Settings   FiO2% 40 %   NIV for Respiratory Failure   $ NIV Charge Yes   NIV-Equipment Initial Setup   Non-Invasive Vent Mode ST   IPAP (cmH2O) 12   EPAP (cm H2O) 6   FiO2 40   Alarms Set / Checked Yes   Non-Invasive Temp (C) 31   Respiratory Rate Patient 21   Spontaneous Vt (ml) 300   Spontaneous Ve (LPM) 6.3   NIV Interface Full Mask

## 2022-10-17 NOTE — PROGRESS NOTES
Pulmonary Progress Note    Date of admission  10/17/2022    Chief Complaint  67 y.o. female admitted 10/17/2022 with sob transferred from [facility]  68 yo female with hx of MI in 11/2021 and hx of CVA with no residual effects   Also documented hx of COPD with no PFTS or CT scan on 50 mg daily of prednisone so unable to verify presented with worsening SOB for three days to [facility]  Became more somnolent with worsening uncompensated hypercarbic respiratory failure and placed on BIPAP and transferred to [facility] on 10/16/2022  Found to be covid +  Continues steroids  CXR no I or E    Interval Problem Update  Reviewed last 24 hour events:  Improved on bipap  Very anxious  Wants to review with her daughter Gardenia regarding her wishes as she does not think she wants intubation      Review of Systems  Review of Systems   Constitutional:  Positive for malaise/fatigue.   HENT: Negative.     Eyes: Negative.    Respiratory:  Positive for shortness of breath.    Cardiovascular: Negative.    Gastrointestinal: Negative.    Genitourinary: Negative.    Musculoskeletal: Negative.    Skin: Negative.    Neurological:  Positive for weakness.   Endo/Heme/Allergies: Negative.    Psychiatric/Behavioral:  The patient is nervous/anxious.       Vital Signs for last 24 hours   Temp:  [35.9 °C (96.7 °F)-36.4 °C (97.5 °F)] (P) 36.4 °C (97.5 °F)  Pulse:  [84-93] (P) 85  Resp:  [15-48] (P) 48  BP: (118-139)/(70-81) (P) 110/63  SpO2:  [92 %-97 %] (P) 97 %         Physical Exam   Physical Exam  Constitutional:       Appearance: She is ill-appearing.   HENT:      Head: Normocephalic and atraumatic.      Mouth/Throat:      Mouth: Mucous membranes are dry.   Eyes:      Extraocular Movements: Extraocular movements intact.      Pupils: Pupils are equal, round, and reactive to light.   Cardiovascular:      Rate and Rhythm: Normal rate and regular rhythm.   Pulmonary:      Effort: Pulmonary effort is normal.      Breath  sounds: Normal breath sounds.   Abdominal:      General: Bowel sounds are normal. There is distension.      Palpations: Abdomen is soft.   Musculoskeletal:         General: Normal range of motion.   Skin:     General: Skin is warm and dry.   Neurological:      General: No focal deficit present.      Mental Status: She is alert and oriented to person, place, and time.   Psychiatric:         Thought Content: Thought content normal.      Comments: Very anxious and emotional       Medications  Current Facility-Administered Medications   Medication Dose Route Frequency Provider Last Rate Last Admin    atorvastatin (LIPITOR) tablet 20 mg  20 mg Oral DAILY Amrit Rasmussen D.O.        buPROPion (WELLBUTRIN) tablet 150 mg  150 mg Oral QAM Amrit Rasmussen D.O.        escitalopram (Lexapro) tablet 10 mg  10 mg Oral Q EVENING Amrit Rasmussen D.O.        mirtazapine (Remeron) orally disintegrating tab 30 mg  30 mg Oral Nightly Amrit Rasmussen D.O.        ondansetron (ZOFRAN) syringe/vial injection 4 mg  4 mg Intravenous Q6HRS PRN Amrit Rasmussen D.O.        ondansetron (ZOFRAN ODT) dispertab 4 mg  4 mg Oral Q6HRS PRN Amrit Rasmussen D.O.        Respiratory Therapy Consult   Nebulization Continuous RT Amrit Rasmussen D.O.        ipratropium-albuterol (DUONEB) nebulizer solution  3 mL Nebulization Q4HRS (RT) Amrit Rasmussen D.O.   3 mL at 10/17/22 0954    ipratropium-albuterol (DUONEB) nebulizer solution  3 mL Nebulization Q2HRS PRN (RT) Amrit Rasmussen D.O.        senna-docusate (PERICOLACE or SENOKOT S) 8.6-50 MG per tablet 2 Tablet  2 Tablet Oral BID Amrit Rasmussen D.O.        And    polyethylene glycol/lytes (MIRALAX) PACKET 1 Packet  1 Packet Oral QDAY PRN Amrit Rasmussen D.O.        And    magnesium hydroxide (MILK OF MAGNESIA) suspension 30 mL  30 mL Oral QDAY PRN Amrit Rasmussen D.O.        And    bisacodyl (DULCOLAX) suppository 10 mg  10 mg Rectal QDAY PRN Amrit Rasmussen D.O.        enoxaparin (Lovenox)  inj 40 mg  40 mg Subcutaneous DAILY AT 1800 Amrit Rasmussen D.O.        acetaminophen (Tylenol) tablet 650 mg  650 mg Oral Q6HRS PRN Amrit Rasmussen D.O.        labetalol (NORMODYNE/TRANDATE) injection 10 mg  10 mg Intravenous Q4HRS PRN Amrit Rasmussen D.O.        methylPREDNISolone (SOLU-MEDROL) 40 MG injection 40 mg  40 mg Intravenous DAILY Amrit Rasmussen D.O.   40 mg at 10/17/22 0554    lactated ringers infusion (BOLUS)  1,000 mL Intravenous Once PRN Amrit Rasmussen D.O.        dexmedetomidine (PRECEDEX) 400 mcg/100mL NS premix infusion  0.1-1.5 mcg/kg/hr Intravenous Continuous Hortensia Rutherford M.D. 4.1 mL/hr at 10/17/22 1027 0.2 mcg/kg/hr at 10/17/22 1027       Fluids    Intake/Output Summary (Last 24 hours) at 10/17/2022 1148  Last data filed at 10/17/2022 1000  Gross per 24 hour   Intake 100 ml   Output 0 ml   Net 100 ml       Laboratory  Recent Labs     10/17/22  0735 10/17/22  1005   ISTATAPH 7.200* 7.278*   ISTATAPCO2 89.3* 73.3*   ISTATAPO2 75 108*   ISTATATCO2 38* 36*   GPYPHJH4SEL 90* 97   ISTATARTHCO3 34.9* 34.3*   ISTATARTBE 4* 5*   ISTATTEMP 98.0 F 98.0 F   ISTATFIO2 40 40   ISTATSPEC Arterial Arterial   ISTATAPHTC 7.205* 7.282*   JDTGYPXT2QT 73 106*         Recent Labs     10/17/22  0500   SODIUM 138   POTASSIUM 4.4   CHLORIDE 99   CO2 31   BUN 24*   CREATININE 0.84   CALCIUM 8.0*     Recent Labs     10/17/22  0500   ALTSGPT 20   ASTSGOT 14   ALKPHOSPHAT 60   TBILIRUBIN 0.2   GLUCOSE 110*     Recent Labs     10/17/22  0500   WBC 17.0*   NEUTSPOLYS 91.70*   LYMPHOCYTES 4.70*   MONOCYTES 1.00   EOSINOPHILS 0.00   BASOPHILS 0.40   ASTSGOT 14   ALTSGPT 20   ALKPHOSPHAT 60   TBILIRUBIN 0.2     Recent Labs     10/17/22  0500 10/17/22  0820   RBC 4.31  --    HEMOGLOBIN 13.5  --    HEMATOCRIT 43.0  --    PLATELETCT 320  --    PROTHROMBTM  --  12.4   INR  --  0.96       Imaging  X-Ray:  I have personally reviewed the images and compared with prior images. NO I or E    Assessment/Plan  * Acute  respiratory failure with hypoxia and hypercapnia (HCC)- (present on admission)  Assessment & Plan  Secondary to covid infection  Unclear if this is COPD or not - hypercarbia is the predominant feature and this may be due to underlying obesity hypoventilation with the fatigue from resp distress from COVID infection  She was on high doses of steroids outpt so will continue current dose of methylprednisolone  Monitor closely  Pt has expressed she does not want intubation but wants to verify with daughter Gardenia first  Transitioning to hiflo from BIPAP - paco2 decreased to 73 with marked improvement in mental state and pH is 7.28  To note some of her medications may be affecting her fatigue level as well    Anxiety  Assessment & Plan  Marked with agitation  As needed precedex as main concern is when she gets very anxious she worsens her respiratory status    Acute encephalopathy- (present on admission)  Assessment & Plan  Resolved with improvement from the BIPAP  Now A and A and O * 4    Depression- (present on admission)  Assessment & Plan  Restarted home meds    Type 2 diabetes mellitus with hyperglycemia, without long-term current use of insulin (HCC)- (present on admission)  Assessment & Plan  As npo  Insulin sliding scale    Leukocytosis- (present on admission)  Assessment & Plan  From steroids  No signs of bacterial infection       VTE:  Lovenox  Ulcer: Not Indicated  Lines: Barry Catheter  Ongoing indication addressed    I have performed a physical exam and reviewed and updated ROS and Plan today (10/17/2022). In review of yesterday's note (10/16/2022), there are no changes except as documented above.     Discussed patient condition and risk of morbidity and/or mortality with RT, Pharmacy, and Charge nurse / hot rounds  The patient remains critically ill.  Critical care time = 45 minutes in directly providing and coordinating critical care and extensive data review.  No time overlap and excludes procedures.

## 2022-10-17 NOTE — PROGRESS NOTES
4 Eyes Skin Assessment Completed by PATRIZIA Ballesteros and PATRIZIA Bull.    Head WDL  Ears WDL  Nose WDL  Mouth WDL  Neck WDL  Breast/Chest WDL  Shoulder Blades WDL  Spine WDL  (R) Arm/Elbow/Hand WDL  (L) Arm/Elbow/Hand WDL  Abdomen WDL  Groin WDL  Scrotum/Coccyx/Buttocks WDL  (R) Leg WDL  (L) Leg WDL  (R) Heel/Foot/Toe WDL  (L) Heel/Foot/Toe WDL      Patient skin intact. WDL ex calluses on heels/feet bilateral.     Devices In Places ECG, Tele Box, Blood Pressure Cuff, Pulse Ox, SCD's, and Bipap      Interventions In Place Pillows, Low Air Loss Mattress, Heels Loaded W/Pillows, and Pressure Redistribution Mattress    Possible Skin Injury No    Pictures Uploaded Into Epic N/A  Wound Consult Placed N/A  RN Wound Prevention Protocol Ordered Yes

## 2022-10-17 NOTE — PROGRESS NOTES
12 hour chart check complete.    Monitor Summary:    Rhythm:  Sinus Rhythm    HR:  85-92    Ectopy: no ectopy    0.16/ 0.08/ 0.38

## 2022-10-17 NOTE — H&P
Hospital Medicine History & Physical Note    Date of Service  10/17/2022    Primary Care Physician  No primary care provider on file.    Consultants  None    Specialist Names: None    Code Status  Full Code    Chief Complaint  No chief complaint on file.      History of Presenting Illness  Stella Prieto is a 67 y.o. female who presented 10/17/2022 with shortness of breath.  Patient initially presented to ER at [facility], transferred here for higher level of care.  At this point in time, patient is on BiPAP and somnolent, is not answering questions, information obtained from the chart.  Nursing staff stated whenever patient arrived she was confused but more awake.  Apparently, patient has been short of breath for 3 days, EMS was called, they noted her in distress and she was given an albuterol treatment.  Patient was placed on BiPAP and transferred here.  Patient does have a history of COPD and is on 50 mg of prednisone daily.  They did obtain a chest x-ray at the outside facility which did not show consolidation.    I discussed the plan of care with bedside RN.    Review of Systems  Review of Systems   Unable to perform ROS: Acuity of condition     Past Medical History   has a past medical history of COPD (chronic obstructive pulmonary disease) ([facility]), Diabetes ([facility]), Hypertension, ST elevation myocardial infarction involving left anterior descending (LAD) coronary artery ([facility]) (11/12/2021), and ST elevation myocardial infarction involving left anterior descending (LAD) coronary artery ([facility]) (11/12/2021).    Surgical History   has no past surgical history on file.     Family History  family history includes Heart Disease (age of onset: 64) in her father.   Family history reviewed with patient. There is no family history that is pertinent to the chief complaint.     Social History   reports that she has quit smoking. She has never used smokeless tobacco. She reports current alcohol  use.    Allergies  Allergies   Allergen Reactions    Sulfa Drugs     Tetracycline        Medications  Prior to Admission Medications   Prescriptions Last Dose Informant Patient Reported? Taking?   LORazepam (ATIVAN) 0.5 MG Tab 10/16/2022 Patient's Home Pharmacy Yes No   Sig: Take 0.25 mg by mouth every evening as needed for Anxiety.   albuterol 108 (90 Base) MCG/ACT Aero Soln inhalation aerosol 10/16/2022 Patient's Home Pharmacy Yes No   Sig: Inhale 2 Puffs every 6 hours as needed for Shortness of Breath.   albuterol 108 (90 Base) MCG/ACT Aero Soln inhalation aerosol 10/16/2022  No No   Sig: Inhale 2 Puffs every four hours as needed for Shortness of Breath.   aspirin (ASA) 81 MG Chew Tab chewable tablet 10/16/2022  No No   Sig: Chew 1 Tablet every day.   atorvastatin (LIPITOR) 40 MG Tab 10/16/2022  No No   Sig: Take 1 Tablet by mouth every evening.   Patient taking differently: Take 20 mg by mouth every day.   buPROPion (WELLBUTRIN) 75 MG Tab 10/16/2022 Patient's Home Pharmacy Yes No   Sig: Take 150 mg by mouth every morning.   escitalopram (LEXAPRO) 10 MG Tab 10/16/2022 Patient's Home Pharmacy Yes No   Sig: Take 10 mg by mouth every evening.   ipratropium-albuterol (COMBIVENT RESPIMAT)  MCG/ACT Aero Soln 10/16/2022 Patient's Home Pharmacy Yes No   Sig: Inhale 1 Puff 4 times a day.   mirtazapine (REMERON) 30 MG Tab tablet   Yes Yes   Sig: Take 30 mg by mouth every evening.   predniSONE (DELTASONE) 20 MG Tab 10/16/2022 Patient's Home Pharmacy Yes No   Sig: Take 50 mg by mouth every day.   predniSONE (DELTASONE) 20 MG Tab 10/16/2022  No No   Sig: Take 1 Tablet by mouth every day.   tiotropium (SPIRIVA RESPIMAT) 2.5 mcg/Act Aero Soln 10/16/2022 Patient's Home Pharmacy Yes No   Sig: Inhale 5 mcg every day.   topiramate (TOPAMAX) 25 MG Tab 10/16/2022 Patient's Home Pharmacy Yes No   Sig: Take 75 mg by mouth every evening.   traZODone (DESYREL) 100 MG Tab 10/16/2022 Patient's Home Pharmacy Yes No   Sig: Take 100 mg  by mouth every evening as needed for Sleep.      Facility-Administered Medications: None       Physical Exam  Temp:  [35.9 °C (96.7 °F)] 35.9 °C (96.7 °F)  Pulse:  [92] 92  Resp:  [25] 25  BP: (138)/(79) 138/79  SpO2:  [93 %] 93 %  Blood Pressure : 138/79   Temperature: 35.9 °C (96.7 °F)   Pulse: 92   Respiration: (!) 25   Pulse Oximetry: 93 %       Physical Exam  Vitals and nursing note reviewed.   Constitutional:       Appearance: She is well-developed. She is obese. She is ill-appearing. She is not diaphoretic.   HENT:      Head: Normocephalic and atraumatic.      Right Ear: External ear normal.      Left Ear: External ear normal.      Nose: Nose normal. No congestion or rhinorrhea.      Mouth/Throat:      Mouth: Mucous membranes are moist.      Pharynx: No oropharyngeal exudate.   Eyes:      General:         Right eye: No discharge.         Left eye: No discharge.   Neck:      Trachea: No tracheal deviation.   Cardiovascular:      Rate and Rhythm: Normal rate and regular rhythm.      Heart sounds: Heart sounds are distant.   Pulmonary:      Effort: Tachypnea present. No respiratory distress.      Breath sounds: No stridor. Decreased breath sounds present. No wheezing or rales.      Comments: On bipap   Abdominal:      General: Bowel sounds are normal. There is no distension.      Palpations: Abdomen is soft.   Musculoskeletal:      Cervical back: Neck supple.      Right lower leg: No edema.      Left lower leg: No edema.   Lymphadenopathy:      Cervical: No cervical adenopathy.   Skin:     General: Skin is warm and dry.      Findings: No erythema or rash.   Neurological:      Comments: Somnolent, nonverbal    Psychiatric:         Speech: She is noncommunicative.       Laboratory:          No results for input(s): ALTSGPT, ASTSGOT, ALKPHOSPHAT, TBILIRUBIN, DBILIRUBIN, GAMMAGT, AMYLASE, LIPASE, ALB, PREALBUMIN, GLUCOSE in the last 72 hours.      No results for input(s): NTPROBNP in the last 72 hours.      No  results for input(s): TROPONINT in the last 72 hours.    Imaging:  DX-CHEST-LIMITED (1 VIEW)    (Results Pending)       no X-Ray or EKG requiring interpretation    Assessment/Plan:  Justification for Admission Status  I anticipate this patient will require at least two midnights for appropriate medical management, necessitating inpatient admission because COPD exacerbation    Patient will need a ICU (Adult and Pediatrics) bed on CARDIOLOGY service .  The need is secondary to COPD exacerbation.    * Acute respiratory failure with hypoxia and hypercapnia (HCC)- (present on admission)  Assessment & Plan  - Due to COVID-19 virus infection, possible bacterial pneumonia as well as COPD exacerbation  -Patient is full code  -Continue BiPAP, at this point in time, I do not feel patient needs to be intubated  -She is oxygenating very well, as long as her CO2 is not worsening, she may not need to be on the BiPAP, instead could be transition to high flow, await ABG    Acute exacerbation of chronic obstructive pulmonary disease (COPD) (HCC)- (present on admission)  Assessment & Plan  - Severe, patient is hardly move any air on exam, now requiring BiPAP, oxygenation is good, can decrease oxygen, if CO2 is not elevated significantly on ABG, will likely be able to transition to high flow however will await ABG  -Due to COVID-19 virus infection and possible bacterial pneumonia  -Causing severe hypoxia  -Continue BiPAP    Acute encephalopathy- (present on admission)  Assessment & Plan  - Now somnolent and nonverbal, however upon arrival patient was noted to be confused, laughing at inappropriate times and not answering questions  -I am unsure what her mental status was at the previous hospital but I am going to obtain an ABG to monitor for worsening hypercapnia    Sepsis (HCC)- (present on admission)  Assessment & Plan  -This is Sepsis Present on admission  SIRS criteria identified on my evaluation include: Tachycardia, with heart  rate greater than 90 BPM and Leukocytosis, with WBC greater than 12,000  Source is COVID-19 virus infection, possible bacterial pneumonia  Sepsis protocol initiated  Fluid resuscitation ordered per protocol  Crystalloid Fluid Administration: Ongoing IV fluids and initial bolus not used due to severe COVID-19 virus infection causing COPD exacerbation requiring BiPAP, as needed bolus only  IV antibiotics as appropriate for source of sepsis  Reassessment: I have reassessed the patient's hemodynamic status  -Start Unasyn and azithromycin  -Obtain procalcitonin  -Await culture results  -Trend lactic acid level  -Patient is at high risk of worsening, closely monitor her hemodynamics and respiratory status    COVID-19 virus infection- (present on admission)  Assessment & Plan  - I am unsure about patient's vaccination status  -Causing sepsis  -Potential bacterial pneumonia as well  -Obtain chest x-ray    Normocytic anemia- (present on admission)  Assessment & Plan  - No sign of gross bleeding  -Continue to closely monitor    Depression- (present on admission)  Assessment & Plan  - Continue home Wellbutrin, Lexapro and Remeron when able    Type 2 diabetes mellitus with hyperglycemia, without long-term current use of insulin (HCC)- (present on admission)  Assessment & Plan  - No significant hyperglycemia at this time however she will be placed on IV steroids, because of this she does need close monitoring, start Accu-Cheks    Dyslipidemia- (present on admission)  Assessment & Plan  - Continue home statin when able    Patient is critically ill and will be placed in the ICU  Organ system initially at risk, respiratory  I have placed the patient on BiPAP we will continue to closely monitor  Critical care time not including procedures, no overlap: 45 minutes    VTE prophylaxis: SCDs/TEDs and enoxaparin ppx

## 2022-10-17 NOTE — PROGRESS NOTES
Patient is A&O to self only at this time. She is confused and unable to answer questions to complete required admission documentation. Although confused, she was able to demonstrate removing the bipap mask.

## 2022-10-17 NOTE — ASSESSMENT & PLAN NOTE
Patient sepsis has resolved  Initial sepsis most likely secondary to COVID-19 pneumonia  Secondary bacterial pneumonia was also highly suspected as procalcitonin levels were high  Antibiotics were utilized and at this point patient's infection is resolving.

## 2022-10-17 NOTE — ASSESSMENT & PLAN NOTE
- Now somnolent and nonverbal, however upon arrival patient was noted to be confused, laughing at inappropriate times and not answering questions  -I am unsure what her mental status was at the previous hospital but I am going to obtain an ABG to monitor for worsening hypercapnia

## 2022-10-17 NOTE — CARE PLAN
The patient is Watcher - Medium risk of patient condition declining or worsening       Patient is unable to answer questions or demonstrate self care at this time due to critical condition. RN was unable to assess this patient's knowledge and understanding of her medical condition. Physical regulation shows hemodynamic stability. Patient is requiring BiPAP for respiratory support.      Progress made toward(s) clinical / shift goals:    Problem: Psychosocial  Goal: Patient's ability to verbalize feelings about condition will improve  Outcome: Progressing     Problem: Hemodynamics  Goal: Patient's hemodynamics, fluid balance and neurologic status will be stable or improve  Outcome: Progressing     Problem: Respiratory  Goal: Patient will achieve/maintain optimum respiratory ventilation and gas exchange  Outcome: Progressing     Problem: Venous Thromboembolism (VTE) Prevention  Goal: The patient will remain free from venous thromboembolism (VTE)  Outcome: Progressing

## 2022-10-17 NOTE — ASSESSMENT & PLAN NOTE
Patient at this point is improving from COVID-19 infection  Patient with high risk because of underlying medical condition including COPD was treated aggressively with steroids.  She seems to be at this point improving

## 2022-10-17 NOTE — CARE PLAN
Problem: Knowledge Deficit - Standard  Goal: Patient and family/care givers will demonstrate understanding of plan of care, disease process/condition, diagnostic tests and medications  Outcome: Progressing     Problem: Self Care  Goal: Patient will have the ability to perform ADLs independently or with assistance (bathe, groom, dress, toilet and feed)  Outcome: Progressing     Problem: Psychosocial  Goal: Patient's ability to verbalize feelings about condition will improve  Outcome: Progressing   The patient is Watcher - Medium risk of patient condition declining or worsening    Shift Goals  Clinical Goals: (P) mainatain O2 >88%, monitor labs.  Patient Goals: (P) rest  Family Goals: (P) SHASHA, family not at bedside    Progress made toward(s) clinical / shift goals:      Patient is not progressing towards the following goals:

## 2022-10-17 NOTE — RESPIRATORY CARE
COPD EDUCATION by COPD CLINICAL EDUCATOR  10/17/2022 at 1:57 PM by Giana Singleton, RRT     Patient reviewed by COPD education team. Patient does  have a history or diagnosis of COPD and is a former smoker.  However, patient currently in ICU, is unable to answer questions or demonstrate self care at this time due to critical condition ICU status on BIPAP, is from [facility], Covid +, PNA, COPD, will check back later

## 2022-10-17 NOTE — ASSESSMENT & PLAN NOTE
Secondary to covid infection  Unclear if this is COPD or not - hypercarbia is the predominant feature and this may be due to underlying obesity hypoventilation with the fatigue from resp distress from COVID infection  She also may have underlying MORENA  She was on high doses of steroids outpt so will continue current dose of methylprednisolone  Monitor closely  Her somnolence has resolved and on hiflo 40 l 40%

## 2022-10-18 PROBLEM — I95.9 ARTERIAL HYPOTENSION: Status: ACTIVE | Noted: 2022-10-18

## 2022-10-18 PROBLEM — G93.40 ACUTE ENCEPHALOPATHY: Status: RESOLVED | Noted: 2022-10-17 | Resolved: 2022-10-18

## 2022-10-18 LAB
ALBUMIN SERPL BCP-MCNC: 3.4 G/DL (ref 3.2–4.9)
ALBUMIN/GLOB SERPL: 1.4 G/DL
ALP SERPL-CCNC: 51 U/L (ref 30–99)
ALT SERPL-CCNC: 16 U/L (ref 2–50)
ANION GAP SERPL CALC-SCNC: 9 MMOL/L (ref 7–16)
AST SERPL-CCNC: 11 U/L (ref 12–45)
BASOPHILS # BLD AUTO: 0.4 % (ref 0–1.8)
BASOPHILS # BLD: 0.04 K/UL (ref 0–0.12)
BILIRUB SERPL-MCNC: 0.2 MG/DL (ref 0.1–1.5)
BUN SERPL-MCNC: 29 MG/DL (ref 8–22)
CALCIUM SERPL-MCNC: 8.6 MG/DL (ref 8.4–10.2)
CHLORIDE SERPL-SCNC: 102 MMOL/L (ref 96–112)
CO2 SERPL-SCNC: 33 MMOL/L (ref 20–33)
CREAT SERPL-MCNC: 0.9 MG/DL (ref 0.5–1.4)
EKG IMPRESSION: NORMAL
EOSINOPHIL # BLD AUTO: 0.05 K/UL (ref 0–0.51)
EOSINOPHIL NFR BLD: 0.5 % (ref 0–6.9)
ERYTHROCYTE [DISTWIDTH] IN BLOOD BY AUTOMATED COUNT: 53.2 FL (ref 35.9–50)
EST. AVERAGE GLUCOSE BLD GHB EST-MCNC: 88 MG/DL
GFR SERPLBLD CREATININE-BSD FMLA CKD-EPI: 71 ML/MIN/1.73 M 2
GLOBULIN SER CALC-MCNC: 2.4 G/DL (ref 1.9–3.5)
GLUCOSE BLD STRIP.AUTO-MCNC: 108 MG/DL (ref 65–99)
GLUCOSE BLD STRIP.AUTO-MCNC: 86 MG/DL (ref 65–99)
GLUCOSE BLD STRIP.AUTO-MCNC: 91 MG/DL (ref 65–99)
GLUCOSE BLD STRIP.AUTO-MCNC: 94 MG/DL (ref 65–99)
GLUCOSE BLD STRIP.AUTO-MCNC: 94 MG/DL (ref 65–99)
GLUCOSE SERPL-MCNC: 101 MG/DL (ref 65–99)
HBA1C MFR BLD: 4.7 % (ref 4–5.6)
HCT VFR BLD AUTO: 40.5 % (ref 37–47)
HGB BLD-MCNC: 12.2 G/DL (ref 12–16)
IMM GRANULOCYTES # BLD AUTO: 0.13 K/UL (ref 0–0.11)
IMM GRANULOCYTES NFR BLD AUTO: 1.2 % (ref 0–0.9)
LYMPHOCYTES # BLD AUTO: 2.68 K/UL (ref 1–4.8)
LYMPHOCYTES NFR BLD: 24.7 % (ref 22–41)
MCH RBC QN AUTO: 31.1 PG (ref 27–33)
MCHC RBC AUTO-ENTMCNC: 30.1 G/DL (ref 33.6–35)
MCV RBC AUTO: 103.3 FL (ref 81.4–97.8)
MONOCYTES # BLD AUTO: 0.74 K/UL (ref 0–0.85)
MONOCYTES NFR BLD AUTO: 6.8 % (ref 0–13.4)
NEUTROPHILS # BLD AUTO: 7.22 K/UL (ref 2–7.15)
NEUTROPHILS NFR BLD: 66.4 % (ref 44–72)
NRBC # BLD AUTO: 0 K/UL
NRBC BLD-RTO: 0 /100 WBC
PLATELET # BLD AUTO: 302 K/UL (ref 164–446)
PMV BLD AUTO: 10.4 FL (ref 9–12.9)
POTASSIUM SERPL-SCNC: 4.2 MMOL/L (ref 3.6–5.5)
PROT SERPL-MCNC: 5.8 G/DL (ref 6–8.2)
RBC # BLD AUTO: 3.92 M/UL (ref 4.2–5.4)
SODIUM SERPL-SCNC: 144 MMOL/L (ref 135–145)
WBC # BLD AUTO: 10.9 K/UL (ref 4.8–10.8)

## 2022-10-18 PROCEDURE — A9270 NON-COVERED ITEM OR SERVICE: HCPCS | Performed by: INTERNAL MEDICINE

## 2022-10-18 PROCEDURE — 94760 N-INVAS EAR/PLS OXIMETRY 1: CPT

## 2022-10-18 PROCEDURE — 700102 HCHG RX REV CODE 250 W/ 637 OVERRIDE(OP): Performed by: INTERNAL MEDICINE

## 2022-10-18 PROCEDURE — 93010 ELECTROCARDIOGRAM REPORT: CPT | Performed by: INTERNAL MEDICINE

## 2022-10-18 PROCEDURE — 99291 CRITICAL CARE FIRST HOUR: CPT | Performed by: INTERNAL MEDICINE

## 2022-10-18 PROCEDURE — 700111 HCHG RX REV CODE 636 W/ 250 OVERRIDE (IP): Performed by: HOSPITALIST

## 2022-10-18 PROCEDURE — 94640 AIRWAY INHALATION TREATMENT: CPT

## 2022-10-18 PROCEDURE — 700111 HCHG RX REV CODE 636 W/ 250 OVERRIDE (IP): Performed by: INTERNAL MEDICINE

## 2022-10-18 PROCEDURE — 770020 HCHG ROOM/CARE - TELE (206)

## 2022-10-18 PROCEDURE — 94660 CPAP INITIATION&MGMT: CPT

## 2022-10-18 PROCEDURE — 85025 COMPLETE CBC W/AUTO DIFF WBC: CPT

## 2022-10-18 PROCEDURE — 700101 HCHG RX REV CODE 250: Performed by: INTERNAL MEDICINE

## 2022-10-18 PROCEDURE — 80053 COMPREHEN METABOLIC PANEL: CPT

## 2022-10-18 PROCEDURE — 82962 GLUCOSE BLOOD TEST: CPT | Mod: 91

## 2022-10-18 PROCEDURE — 93005 ELECTROCARDIOGRAM TRACING: CPT | Performed by: INTERNAL MEDICINE

## 2022-10-18 PROCEDURE — 83036 HEMOGLOBIN GLYCOSYLATED A1C: CPT

## 2022-10-18 RX ORDER — MORPHINE SULFATE 4 MG/ML
2-4 INJECTION INTRAVENOUS
Status: DISPENSED | OUTPATIENT
Start: 2022-10-18 | End: 2022-10-19

## 2022-10-18 RX ORDER — MORPHINE SULFATE 15 MG/1
7.5 TABLET ORAL EVERY 6 HOURS PRN
Status: ACTIVE | OUTPATIENT
Start: 2022-10-18 | End: 2022-10-19

## 2022-10-18 RX ORDER — ACETAMINOPHEN 325 MG/1
650 TABLET ORAL EVERY 6 HOURS PRN
Status: DISCONTINUED | OUTPATIENT
Start: 2022-10-18 | End: 2022-10-20 | Stop reason: HOSPADM

## 2022-10-18 RX ORDER — IPRATROPIUM BROMIDE AND ALBUTEROL SULFATE 2.5; .5 MG/3ML; MG/3ML
3 SOLUTION RESPIRATORY (INHALATION)
Status: DISCONTINUED | OUTPATIENT
Start: 2022-10-18 | End: 2022-10-20 | Stop reason: HOSPADM

## 2022-10-18 RX ADMIN — MIRTAZAPINE 30 MG: 15 TABLET, ORALLY DISINTEGRATING ORAL at 20:36

## 2022-10-18 RX ADMIN — TIOTROPIUM BROMIDE INHALATION SPRAY 5 MCG: 3.12 SPRAY, METERED RESPIRATORY (INHALATION) at 10:50

## 2022-10-18 RX ADMIN — MORPHINE SULFATE 4 MG: 4 INJECTION INTRAVENOUS at 23:41

## 2022-10-18 RX ADMIN — IPRATROPIUM BROMIDE AND ALBUTEROL SULFATE 3 ML: 2.5; .5 SOLUTION RESPIRATORY (INHALATION) at 07:27

## 2022-10-18 RX ADMIN — ATORVASTATIN CALCIUM 20 MG: 20 TABLET, FILM COATED ORAL at 06:06

## 2022-10-18 RX ADMIN — ESCITALOPRAM OXALATE 10 MG: 10 TABLET ORAL at 17:49

## 2022-10-18 RX ADMIN — SENNOSIDES AND DOCUSATE SODIUM 2 TABLET: 50; 8.6 TABLET ORAL at 06:05

## 2022-10-18 RX ADMIN — ONDANSETRON 4 MG: 4 TABLET, ORALLY DISINTEGRATING ORAL at 13:58

## 2022-10-18 RX ADMIN — METHYLPREDNISOLONE SODIUM SUCCINATE 40 MG: 40 INJECTION, POWDER, FOR SOLUTION INTRAMUSCULAR; INTRAVENOUS at 06:03

## 2022-10-18 RX ADMIN — IPRATROPIUM BROMIDE AND ALBUTEROL SULFATE 3 ML: 2.5; .5 SOLUTION RESPIRATORY (INHALATION) at 02:40

## 2022-10-18 RX ADMIN — ENOXAPARIN SODIUM 40 MG: 100 INJECTION SUBCUTANEOUS at 17:49

## 2022-10-18 RX ADMIN — MORPHINE SULFATE 4 MG: 4 INJECTION INTRAVENOUS at 20:36

## 2022-10-18 RX ADMIN — DEXMEDETOMIDINE HYDROCHLORIDE 0.3 MCG/KG/HR: 4 INJECTION, SOLUTION INTRAVENOUS at 09:25

## 2022-10-18 RX ADMIN — BUPROPION HYDROCHLORIDE 150 MG: 100 TABLET, FILM COATED ORAL at 06:06

## 2022-10-18 ASSESSMENT — PAIN DESCRIPTION - PAIN TYPE
TYPE: ACUTE PAIN

## 2022-10-18 ASSESSMENT — COGNITIVE AND FUNCTIONAL STATUS - GENERAL
TOILETING: A LOT
WALKING IN HOSPITAL ROOM: A LOT
DRESSING REGULAR UPPER BODY CLOTHING: A LITTLE
HELP NEEDED FOR BATHING: A LOT
SUGGESTED CMS G CODE MODIFIER MOBILITY: CL
STANDING UP FROM CHAIR USING ARMS: A LOT
MOBILITY SCORE: 13
DRESSING REGULAR LOWER BODY CLOTHING: A LOT
CLIMB 3 TO 5 STEPS WITH RAILING: A LOT
SUGGESTED CMS G CODE MODIFIER DAILY ACTIVITY: CK
DAILY ACTIVITIY SCORE: 17
MOVING TO AND FROM BED TO CHAIR: A LOT
TURNING FROM BACK TO SIDE WHILE IN FLAT BAD: A LITTLE
MOVING FROM LYING ON BACK TO SITTING ON SIDE OF FLAT BED: A LOT

## 2022-10-18 ASSESSMENT — ENCOUNTER SYMPTOMS
NERVOUS/ANXIOUS: 0
WEAKNESS: 1
EYES NEGATIVE: 1
SHORTNESS OF BREATH: 0
MUSCULOSKELETAL NEGATIVE: 1
GASTROINTESTINAL NEGATIVE: 1
CARDIOVASCULAR NEGATIVE: 1

## 2022-10-18 ASSESSMENT — PULMONARY FUNCTION TESTS
EPAP_CMH2O: 6
EPAP_CMH2O: 6

## 2022-10-18 NOTE — CARE PLAN
The patient is Watcher - Medium risk of patient condition declining or worsening    Shift Goals  Clinical Goals: Maintain O2 88% and MAP >65, SBP > 100  Patient Goals: Rest and feel better  Family Goals: SHASHA    Progress made toward(s) clinical / shift goals:    Problem: Risk for Infection - COPD  Goal: Patient will remain free from signs and symptoms of infection  Outcome: Progressing  Note: Patient's WBC markedly improved.       Patient is not progressing towards the following goals:      Problem: Urinary Elimination  Goal: Establish and maintain regular urinary output  Outcome: Not Progressing  Note: Patient's urinary output not yet appropriate at 200 mL/shift.

## 2022-10-18 NOTE — PROGRESS NOTES
12-hour chart check complete.    Monitor Summary  Rhythm: SR/SB  Rate: 50-90s  Ectopy: r-f) PVC, r) PAC, r) coup  Measurements: .16/.08/.44

## 2022-10-18 NOTE — PROGRESS NOTES
Pulmonary Progress Note    Date of admission  10/17/2022    Chief Complaint  67 y.o. female admitted 10/17/2022 with sob transferred from [facility] Course  66 yo female with hx of MI in 11/2021 and hx of CVA with no residual effects   Also documented hx of COPD with no PFTS or CT scan on 50 mg daily of prednisone so unable to verify presented with worsening SOB for three days to [facility]  Became more somnolent with worsening uncompensated hypercarbic respiratory failure and placed on BIPAP and transferred to [facility] on 10/16/2022  Found to be covid +  Continues steroids  CXR no I or E  Required intermitted bipap for 48 hrs and now awake, irate that we have not walked her and transitioned to hiflow 40% 40 l    Interval Problem Update  Reviewed last 24 hour events:  As above   Pt appears markedly better  Still on 1 mcg of levophed but eating breakfast so hopefully will stop  Pt will fill out POLST form  She is DNR/DNI      Review of Systems  Review of Systems   Constitutional:  Positive for malaise/fatigue.   HENT: Negative.     Eyes: Negative.    Respiratory:  Negative for shortness of breath.    Cardiovascular: Negative.    Gastrointestinal: Negative.    Genitourinary: Negative.    Musculoskeletal: Negative.    Skin: Negative.    Neurological:  Positive for weakness.   Endo/Heme/Allergies: Negative.    Psychiatric/Behavioral:  The patient is not nervous/anxious.       Vital Signs for last 24 hours   Temp:  [36.3 °C (97.3 °F)-37.7 °C (99.9 °F)] 37.4 °C (99.3 °F)  Pulse:  [55-92] 83  Resp:  [9-48] 12  BP: ()/(57-78) 103/57  SpO2:  [73 %-99 %] 95 %         Physical Exam   Physical Exam  Constitutional:       Appearance: She is obese. She is ill-appearing.   HENT:      Head: Normocephalic and atraumatic.      Mouth/Throat:      Mouth: Mucous membranes are dry.   Eyes:      Extraocular Movements: Extraocular movements intact.      Pupils: Pupils are equal, round, and reactive to light.    Cardiovascular:      Rate and Rhythm: Normal rate and regular rhythm.   Pulmonary:      Effort: Pulmonary effort is normal.      Breath sounds: Normal breath sounds.   Abdominal:      General: Bowel sounds are normal. There is distension.      Palpations: Abdomen is soft.   Musculoskeletal:         General: Normal range of motion.   Skin:     General: Skin is warm and dry.   Neurological:      General: No focal deficit present.      Mental Status: She is alert and oriented to person, place, and time.   Psychiatric:         Mood and Affect: Mood normal.         Behavior: Behavior normal.         Thought Content: Thought content normal.         Judgment: Judgment normal.       Medications  Current Facility-Administered Medications   Medication Dose Route Frequency Provider Last Rate Last Admin    acetaminophen (Tylenol) tablet 650 mg  650 mg Oral Q6HRS PRN Hortensia Rutherford M.D.        ipratropium-albuterol (DUONEB) nebulizer solution  3 mL Nebulization Q4H PRN (RT) Hortensia Rutherford M.D.        tiotropium (Spiriva Respimat) 2.5 mcg/Act inhalation spray 5 mcg  5 mcg Inhalation QDAILY (RT) Hortensia Rutherford M.D.   5 mcg at 10/18/22 1050    atorvastatin (LIPITOR) tablet 20 mg  20 mg Oral DAILY SAMIA Jean-Baptiste.O.   20 mg at 10/18/22 0606    buPROPion (WELLBUTRIN) tablet 150 mg  150 mg Oral QAM SAMIA Jean-Baptiste.O.   150 mg at 10/18/22 0606    escitalopram (Lexapro) tablet 10 mg  10 mg Oral Q EVENING KAUSHIK Jean-BaptisteO.   10 mg at 10/17/22 1723    mirtazapine (Remeron) orally disintegrating tab 30 mg  30 mg Oral Nightly KAUSHIK Jean-BaptisteO.        ondansetron (ZOFRAN) syringe/vial injection 4 mg  4 mg Intravenous Q6HRS PRN Amrit Rasmussen D.O.        ondansetron (ZOFRAN ODT) dispertab 4 mg  4 mg Oral Q6HRS PRN KAUSHIK Jean-BaptisteO.        Respiratory Therapy Consult   Nebulization Continuous RT Amrit Rasmussen D.O.        senna-docusate (PERICOLACE or SENOKOT S) 8.6-50 MG per tablet 2 Tablet  2  Tablet Oral BID Amrit Rasmussen D.O.   2 Tablet at 10/18/22 0605    And    polyethylene glycol/lytes (MIRALAX) PACKET 1 Packet  1 Packet Oral QDAY PRN Amrit Rasmussen D.O.        And    magnesium hydroxide (MILK OF MAGNESIA) suspension 30 mL  30 mL Oral QDAY PRN Amrit Rasmussen D.O.        And    bisacodyl (DULCOLAX) suppository 10 mg  10 mg Rectal QDAY PRN Amrit Rasmussen D.O.        enoxaparin (Lovenox) inj 40 mg  40 mg Subcutaneous DAILY AT 1800 Amrit Rasmussen D.O.   40 mg at 10/17/22 1723    labetalol (NORMODYNE/TRANDATE) injection 10 mg  10 mg Intravenous Q4HRS PRN Amrit Rasmussen D.O.        methylPREDNISolone (SOLU-MEDROL) 40 MG injection 40 mg  40 mg Intravenous DAILY Amrit Rasmussen D.O.   40 mg at 10/18/22 0603    lactated ringers infusion (BOLUS)  1,000 mL Intravenous Once PRN Amrit Rasmussen D.O.        insulin regular (HumuLIN R,NovoLIN R) injection  1-6 Units Subcutaneous Q6HRS Hortensia Rutherford M.D.        And    dextrose 50% (D50W) injection 25 g  25 g Intravenous Q15 MIN PRN Hortensia Rutherford M.D.           Fluids    Intake/Output Summary (Last 24 hours) at 10/18/2022 1107  Last data filed at 10/18/2022 1000  Gross per 24 hour   Intake 1605.33 ml   Output 935 ml   Net 670.33 ml       Laboratory  Recent Labs     10/17/22  0735 10/17/22  1005   ISTATAPH 7.200* 7.278*   ISTATAPCO2 89.3* 73.3*   ISTATAPO2 75 108*   ISTATATCO2 38* 36*   [id number] 90* 97   ISTATARTHCO3 34.9* 34.3*   ISTATARTBE 4* 5*   ISTATTEMP 98.0 F 98.0 F   ISTATFIO2 40 40   ISTATSPEC Arterial Arterial   ISTATAPHTC 7.205* 7.282*   [id number] 73 106*         Recent Labs     10/17/22  0500 10/18/22  0501   SODIUM 138 144   POTASSIUM 4.4 4.2   CHLORIDE 99 102   CO2 31 33   BUN 24* 29*   CREATININE 0.84 0.90   CALCIUM 8.0* 8.6     Recent Labs     10/17/22  0500 10/18/22  0501   ALTSGPT 20 16   ASTSGOT 14 11*   ALKPHOSPHAT 60 51   TBILIRUBIN 0.2 0.2   GLUCOSE 110* 101*     Recent Labs     10/17/22  0500 10/18/22  0501    WBC 17.0* 10.9*   NEUTSPOLYS 91.70* 66.40   LYMPHOCYTES 4.70* 24.70   MONOCYTES 1.00 6.80   EOSINOPHILS 0.00 0.50   BASOPHILS 0.40 0.40   ASTSGOT 14 11*   ALTSGPT 20 16   ALKPHOSPHAT 60 51   TBILIRUBIN 0.2 0.2     Recent Labs     10/17/22  0500 10/17/22  0820 10/18/22  0501   RBC 4.31  --  3.92*   HEMOGLOBIN 13.5  --  12.2   HEMATOCRIT 43.0  --  40.5   PLATELETCT 320  --  302   PROTHROMBTM  --  12.4  --    INR  --  0.96  --        Imaging  X-Ray:  I have personally reviewed the images and compared with prior images. NO I or E on 10/17    Assessment/Plan  * Acute respiratory failure with hypoxia and hypercapnia (HCC)- (present on admission)  Assessment & Plan  Secondary to covid infection  Unclear if this is COPD or not - hypercarbia is the predominant feature and this may be due to underlying obesity hypoventilation with the fatigue from resp distress from COVID infection  She also may have underlying MORENA  She was on high doses of steroids outpt so will continue current dose of methylprednisolone  Monitor closely  Her somnolence has resolved and on hiflo 40 l 40%    Arterial hypotension  Assessment & Plan  Likely due to poor oral intake  Only one mcg of levophed  Hopefully we can wean off    Anxiety  Assessment & Plan  Now not anxious but irate  Turning off precedex  On ome meds    Depression- (present on admission)  Assessment & Plan  Restarted home meds    Type 2 diabetes mellitus with hyperglycemia, without long-term current use of insulin (HCC)- (present on admission)  Assessment & Plan  Diabetic diet  Insulin sliding scale    Leukocytosis- (present on admission)  Assessment & Plan  From steroids  No signs of bacterial infection      VTE:  Lovenox  Ulcer: Not Indicated  Lines: Barry Catheter  Ongoing indication addressed    I have performed a physical exam and reviewed and updated ROS and Plan today (10/18/2022). In review of yesterday's note (10/17/2022), there are no changes except as documented above.      Discussed patient condition and risk of morbidity and/or mortality with RT, Pharmacy, and Charge nurse / hot rounds  The patient remains critically ill.  Critical care time = 32 minutes in directly providing and coordinating critical care and extensive data review.  No time overlap and excludes procedures.

## 2022-10-18 NOTE — PROGRESS NOTES
12-hour chart check complete.    Monitor Summary  Rhythm: SR  Rate: 70-90  Ectopy: pSVT 6 seconds  Measurements: .16/.08/.36

## 2022-10-19 LAB
APPEARANCE UR: CLEAR
BACTERIA #/AREA URNS HPF: ABNORMAL /HPF
BASOPHILS # BLD AUTO: 0.3 % (ref 0–1.8)
BASOPHILS # BLD: 0.06 K/UL (ref 0–0.12)
BILIRUB UR QL STRIP.AUTO: NEGATIVE
COLOR UR: YELLOW
EOSINOPHIL # BLD AUTO: 0.07 K/UL (ref 0–0.51)
EOSINOPHIL NFR BLD: 0.4 % (ref 0–6.9)
EPI CELLS #/AREA URNS HPF: ABNORMAL /HPF
ERYTHROCYTE [DISTWIDTH] IN BLOOD BY AUTOMATED COUNT: 52.8 FL (ref 35.9–50)
GLUCOSE BLD STRIP.AUTO-MCNC: 111 MG/DL (ref 65–99)
GLUCOSE BLD STRIP.AUTO-MCNC: 112 MG/DL (ref 65–99)
GLUCOSE BLD STRIP.AUTO-MCNC: 83 MG/DL (ref 65–99)
GLUCOSE UR STRIP.AUTO-MCNC: NEGATIVE MG/DL
HCT VFR BLD AUTO: 44.9 % (ref 37–47)
HGB BLD-MCNC: 13.7 G/DL (ref 12–16)
IMM GRANULOCYTES # BLD AUTO: 0.29 K/UL (ref 0–0.11)
IMM GRANULOCYTES NFR BLD AUTO: 1.5 % (ref 0–0.9)
KETONES UR STRIP.AUTO-MCNC: NEGATIVE MG/DL
LEUKOCYTE ESTERASE UR QL STRIP.AUTO: ABNORMAL
LYMPHOCYTES # BLD AUTO: 3.85 K/UL (ref 1–4.8)
LYMPHOCYTES NFR BLD: 19.6 % (ref 22–41)
MCH RBC QN AUTO: 31.2 PG (ref 27–33)
MCHC RBC AUTO-ENTMCNC: 30.5 G/DL (ref 33.6–35)
MCV RBC AUTO: 102.3 FL (ref 81.4–97.8)
MICRO URNS: ABNORMAL
MONOCYTES # BLD AUTO: 1.28 K/UL (ref 0–0.85)
MONOCYTES NFR BLD AUTO: 6.5 % (ref 0–13.4)
NEUTROPHILS # BLD AUTO: 14.05 K/UL (ref 2–7.15)
NEUTROPHILS NFR BLD: 71.7 % (ref 44–72)
NITRITE UR QL STRIP.AUTO: NEGATIVE
NRBC # BLD AUTO: 0 K/UL
NRBC BLD-RTO: 0 /100 WBC
PH UR STRIP.AUTO: 6.5 [PH] (ref 5–8)
PLATELET # BLD AUTO: 393 K/UL (ref 164–446)
PMV BLD AUTO: 10.5 FL (ref 9–12.9)
PROT UR QL STRIP: NEGATIVE MG/DL
RBC # BLD AUTO: 4.39 M/UL (ref 4.2–5.4)
RBC # URNS HPF: ABNORMAL /HPF
RBC UR QL AUTO: ABNORMAL
SP GR UR STRIP.AUTO: 1.01
WBC # BLD AUTO: 19.6 K/UL (ref 4.8–10.8)
WBC #/AREA URNS HPF: ABNORMAL /HPF

## 2022-10-19 PROCEDURE — 36415 COLL VENOUS BLD VENIPUNCTURE: CPT

## 2022-10-19 PROCEDURE — 700102 HCHG RX REV CODE 250 W/ 637 OVERRIDE(OP): Performed by: INTERNAL MEDICINE

## 2022-10-19 PROCEDURE — 700102 HCHG RX REV CODE 250 W/ 637 OVERRIDE(OP): Performed by: HOSPITALIST

## 2022-10-19 PROCEDURE — 94664 DEMO&/EVAL PT USE INHALER: CPT

## 2022-10-19 PROCEDURE — 85025 COMPLETE CBC W/AUTO DIFF WBC: CPT

## 2022-10-19 PROCEDURE — 770020 HCHG ROOM/CARE - TELE (206)

## 2022-10-19 PROCEDURE — 700111 HCHG RX REV CODE 636 W/ 250 OVERRIDE (IP): Performed by: INTERNAL MEDICINE

## 2022-10-19 PROCEDURE — A9270 NON-COVERED ITEM OR SERVICE: HCPCS | Performed by: HOSPITALIST

## 2022-10-19 PROCEDURE — A9270 NON-COVERED ITEM OR SERVICE: HCPCS | Performed by: INTERNAL MEDICINE

## 2022-10-19 PROCEDURE — 81001 URINALYSIS AUTO W/SCOPE: CPT

## 2022-10-19 PROCEDURE — 94640 AIRWAY INHALATION TREATMENT: CPT

## 2022-10-19 PROCEDURE — 94760 N-INVAS EAR/PLS OXIMETRY 1: CPT

## 2022-10-19 PROCEDURE — 82962 GLUCOSE BLOOD TEST: CPT | Mod: 91

## 2022-10-19 PROCEDURE — 99232 SBSQ HOSP IP/OBS MODERATE 35: CPT | Performed by: HOSPITALIST

## 2022-10-19 RX ORDER — MORPHINE SULFATE 15 MG/1
7.5 TABLET ORAL EVERY 6 HOURS PRN
Status: DISPENSED | OUTPATIENT
Start: 2022-10-19 | End: 2022-10-20

## 2022-10-19 RX ORDER — BUDESONIDE AND FORMOTEROL FUMARATE DIHYDRATE 160; 4.5 UG/1; UG/1
2 AEROSOL RESPIRATORY (INHALATION)
Status: DISCONTINUED | OUTPATIENT
Start: 2022-10-19 | End: 2022-10-20 | Stop reason: HOSPADM

## 2022-10-19 RX ORDER — CYCLOBENZAPRINE HCL 10 MG
10 TABLET ORAL 3 TIMES DAILY PRN
Status: DISCONTINUED | OUTPATIENT
Start: 2022-10-19 | End: 2022-10-20 | Stop reason: HOSPADM

## 2022-10-19 RX ORDER — MORPHINE SULFATE 4 MG/ML
2-4 INJECTION INTRAVENOUS EVERY 4 HOURS PRN
Status: ACTIVE | OUTPATIENT
Start: 2022-10-19 | End: 2022-10-19

## 2022-10-19 RX ORDER — BUDESONIDE AND FORMOTEROL FUMARATE DIHYDRATE 160; 4.5 UG/1; UG/1
2 AEROSOL RESPIRATORY (INHALATION) 2 TIMES DAILY
Qty: 1 EACH | Refills: 3 | Status: SHIPPED | OUTPATIENT
Start: 2022-10-19

## 2022-10-19 RX ORDER — BUDESONIDE AND FORMOTEROL FUMARATE DIHYDRATE 160; 4.5 UG/1; UG/1
2 AEROSOL RESPIRATORY (INHALATION)
Status: DISCONTINUED | OUTPATIENT
Start: 2022-10-19 | End: 2022-10-19

## 2022-10-19 RX ADMIN — BUDESONIDE AND FORMOTEROL FUMARATE DIHYDRATE 2 PUFF: 160; 4.5 AEROSOL RESPIRATORY (INHALATION) at 14:45

## 2022-10-19 RX ADMIN — TIOTROPIUM BROMIDE INHALATION SPRAY 5 MCG: 3.12 SPRAY, METERED RESPIRATORY (INHALATION) at 08:00

## 2022-10-19 RX ADMIN — BUPROPION HYDROCHLORIDE 150 MG: 100 TABLET, FILM COATED ORAL at 06:08

## 2022-10-19 RX ADMIN — ESCITALOPRAM OXALATE 10 MG: 10 TABLET ORAL at 17:47

## 2022-10-19 RX ADMIN — MORPHINE SULFATE 7.5 MG: 15 TABLET ORAL at 21:37

## 2022-10-19 RX ADMIN — BUDESONIDE AND FORMOTEROL FUMARATE DIHYDRATE 2 PUFF: 160; 4.5 AEROSOL RESPIRATORY (INHALATION) at 20:00

## 2022-10-19 RX ADMIN — MIRTAZAPINE 30 MG: 15 TABLET, ORALLY DISINTEGRATING ORAL at 21:37

## 2022-10-19 RX ADMIN — ENOXAPARIN SODIUM 40 MG: 100 INJECTION SUBCUTANEOUS at 17:47

## 2022-10-19 RX ADMIN — ATORVASTATIN CALCIUM 20 MG: 20 TABLET, FILM COATED ORAL at 06:09

## 2022-10-19 RX ADMIN — CYCLOBENZAPRINE 10 MG: 10 TABLET, FILM COATED ORAL at 23:50

## 2022-10-19 RX ADMIN — METHYLPREDNISOLONE SODIUM SUCCINATE 40 MG: 40 INJECTION, POWDER, FOR SOLUTION INTRAMUSCULAR; INTRAVENOUS at 06:09

## 2022-10-19 RX ADMIN — ACETAMINOPHEN 650 MG: 325 TABLET, FILM COATED ORAL at 17:47

## 2022-10-19 RX ADMIN — ONDANSETRON 4 MG: 2 INJECTION INTRAMUSCULAR; INTRAVENOUS at 14:44

## 2022-10-19 ASSESSMENT — ENCOUNTER SYMPTOMS
BRUISES/BLEEDS EASILY: 0
NAUSEA: 0
FOCAL WEAKNESS: 0
DOUBLE VISION: 0
MYALGIAS: 1
HEMOPTYSIS: 0
COUGH: 1
FEVER: 0
CONSTIPATION: 0
CARDIOVASCULAR NEGATIVE: 1
NEUROLOGICAL NEGATIVE: 1
BACK PAIN: 1
ABDOMINAL PAIN: 0
DIAPHORESIS: 0
BLOOD IN STOOL: 0
SHORTNESS OF BREATH: 1
DIZZINESS: 0
LOSS OF CONSCIOUSNESS: 0
WHEEZING: 0
SEIZURES: 0
HEADACHES: 0
DIARRHEA: 0
CHILLS: 0
GASTROINTESTINAL NEGATIVE: 1
HEARTBURN: 0
NERVOUS/ANXIOUS: 0
EYES NEGATIVE: 1
PALPITATIONS: 0
VOMITING: 0
PSYCHIATRIC NEGATIVE: 1

## 2022-10-19 ASSESSMENT — PAIN DESCRIPTION - PAIN TYPE
TYPE: ACUTE PAIN

## 2022-10-19 NOTE — PROGRESS NOTES
12-hour chart check complete.    Monitor Summary  Rhythm: SR  Rate: 75  Ectopy: none  Measurements: 0.16/0.08/0.40

## 2022-10-19 NOTE — PROGRESS NOTES
4 Eyes Skin Assessment Completed by PATRIZIA Pineda and PATRIZIA Okeefe.    Head WDL  Ears WDL  Nose WDL  Mouth WDL  Neck WDL  Breast/Chest WDL  Shoulder Blades WDL  Spine WDL  (R) Arm/Elbow/Hand Bruising  (L) Arm/Elbow/Hand Bruising  Abdomen WDL  Groin WDL  Scrotum/Coccyx/Buttocks WDL  (R) Leg Bruising  (L) Leg Bruising  (R) Heel/Foot/Toe WDL  (L) Heel/Foot/Toe WDL          Devices In Places Tele Box, Blood Pressure Cuff, Pulse Ox, and Nasal Cannula      Interventions In Place Pillows and Pressure Redistribution Mattress    Possible Skin Injury No    Pictures Uploaded Into Epic N/A  Wound Consult Placed N/A  RN Wound Prevention Protocol Ordered No

## 2022-10-19 NOTE — PROGRESS NOTES
Monitor Summary:    Rhythm:  SR   Rate Range:    Ectopy: R/oPVC, r/fPAC, rCOUP, rTRIG/BIG    Measurements:  0.16/0.08/0.32   (Measured by monitor tech)

## 2022-10-19 NOTE — PROGRESS NOTES
[facility] Medicine Daily Progress Note    Date of Service  10/19/2022    Chief Complaint  Stella Prieto is a 67 y.o. female admitted 10/17/2022 with shortness of breath    Hospital Course  Patient with a history of COPD and now new sepsis with respiratory failure was initially admitted to the ICU.  Patient's diagnosis was bacterial pneumonia as well as COVID-19 infection.  Patient was aggressively treated with antibiotics as well as steroids.  Patient's condition has improved.  Patient is now able to transfer out of the ICU when she is off the high flow by nasal cannula.  She is down to 4 L by nasal cannula and she usually uses 2 L by nasal cannula at baseline.  At this point we will have PT OT evaluate the patient and we will set up outpatient follow-ups with the patient as well dissipate discharging home tomorrow.    Interval Problem Update  PT OT evaluation  Continued oxygen support and wean down oxygen as tolerated  Continue antibiotics  Continue RT protocol  Continue nebulizer treatments  Discharge planning      I have discussed this patient's plan of care and discharge plan at IDT rounds today with Case Management, Nursing, Nursing leadership, and other members of the IDT team.    Consultants/Specialty  critical care    Code Status  DNAR/DNI    Disposition  Patient is not medically cleared for discharge.   Anticipate discharge to to home with close outpatient follow-up.  I have placed the appropriate orders for post-discharge needs.    Review of Systems  Review of Systems   Constitutional:  Positive for malaise/fatigue. Negative for chills, diaphoresis and fever.   HENT: Negative.     Eyes: Negative.  Negative for double vision.   Respiratory:  Positive for cough and shortness of breath. Negative for hemoptysis and wheezing.    Cardiovascular: Negative.  Negative for chest pain, palpitations and leg swelling.   Gastrointestinal: Negative.  Negative for abdominal pain, blood in stool, constipation,  diarrhea, heartburn, nausea and vomiting.   Genitourinary: Negative.  Negative for frequency, hematuria and urgency.   Musculoskeletal:  Positive for back pain and myalgias. Negative for joint pain.   Skin: Negative.  Negative for itching and rash.   Neurological: Negative.  Negative for dizziness, focal weakness, seizures, loss of consciousness and headaches.   Endo/Heme/Allergies: Negative.  Does not bruise/bleed easily.   Psychiatric/Behavioral: Negative.  Negative for suicidal ideas. The patient is not nervous/anxious.    All other systems reviewed and are negative.     Physical Exam  Temp:  [36.5 °C (97.7 °F)-37.6 °C (99.7 °F)] 36.7 °C (98.1 °F)  Pulse:  [] 89  Resp:  [12-21] 17  BP: (118-163)/(57-82) 146/65  SpO2:  [92 %-99 %] 95 %    Physical Exam  Vitals and nursing note reviewed. Exam conducted with a chaperone present.   Constitutional:       Appearance: She is well-developed. She is obese. She is ill-appearing.   HENT:      Head: Normocephalic and atraumatic.      Right Ear: External ear normal.      Left Ear: External ear normal.      Nose: Nose normal.      Mouth/Throat:      Mouth: Mucous membranes are moist.      Pharynx: Oropharynx is clear.   Eyes:      Extraocular Movements: Extraocular movements intact.      Conjunctiva/sclera: Conjunctivae normal.      Pupils: Pupils are equal, round, and reactive to light.   Neck:      Thyroid: No thyromegaly.      Vascular: No JVD.   Cardiovascular:      Rate and Rhythm: Normal rate and regular rhythm.      Pulses: Normal pulses.      Heart sounds: Normal heart sounds.   Pulmonary:      Effort: Pulmonary effort is normal. Tachypnea present.      Breath sounds: Decreased air movement present. Examination of the right-upper field reveals decreased breath sounds. Examination of the left-upper field reveals decreased breath sounds. Examination of the right-middle field reveals decreased breath sounds. Examination of the left-middle field reveals decreased  breath sounds. Examination of the right-lower field reveals decreased breath sounds. Examination of the left-lower field reveals decreased breath sounds. Decreased breath sounds present.   Chest:      Chest wall: No tenderness.   Abdominal:      General: Abdomen is flat. Bowel sounds are normal. There is no distension.      Palpations: There is no mass.      Tenderness: There is no abdominal tenderness. There is no guarding or rebound.   Musculoskeletal:         General: Normal range of motion.      Cervical back: Normal range of motion and neck supple.      Right lower leg: No edema.      Left lower leg: No edema.   Lymphadenopathy:      Cervical: No cervical adenopathy.   Skin:     General: Skin is warm and dry.      Capillary Refill: Capillary refill takes less than 2 seconds.      Findings: No rash.   Neurological:      General: No focal deficit present.      Mental Status: She is alert and oriented to person, place, and time. Mental status is at baseline.      Cranial Nerves: No cranial nerve deficit.      Deep Tendon Reflexes: Reflexes are normal and symmetric.   Psychiatric:         Mood and Affect: Mood normal.         Behavior: Behavior normal.         Thought Content: Thought content normal.         Judgment: Judgment normal.       Fluids    Intake/Output Summary (Last 24 hours) at 10/19/2022 1204  Last data filed at 10/19/2022 0857  Gross per 24 hour   Intake 580 ml   Output 1000 ml   Net -420 ml       Laboratory  Recent Labs     10/17/22  0500 10/18/22  0501 10/19/22  0443   WBC 17.0* 10.9* 19.6*   RBC 4.31 3.92* 4.39   HEMOGLOBIN 13.5 12.2 13.7   HEMATOCRIT 43.0 40.5 44.9   MCV 99.8* 103.3* 102.3*   MCH 31.3 31.1 31.2   MCHC 31.4* 30.1* 30.5*   RDW 51.9* 53.2* 52.8*   PLATELETCT 320 302 393   MPV 10.1 10.4 10.5     Recent Labs     10/17/22  0500 10/18/22  0501   SODIUM 138 144   POTASSIUM 4.4 4.2   CHLORIDE 99 102   CO2 31 33   GLUCOSE 110* 101*   BUN 24* 29*   CREATININE 0.84 0.90   CALCIUM 8.0* 8.6      Recent Labs     10/17/22  0820   INR 0.96               Imaging  DX-CHEST-LIMITED (1 VIEW)   Final Result      1.  Mildly enlarged cardiac silhouette. There is no acute cardiopulmonary process.           Assessment/Plan  * Acute respiratory failure with hypoxia and hypercapnia ([facility])- (present on admission)  Assessment & Plan  Patient was able to be weaned off the high flow by nasal cannula and at this point she is down to 4 L by nasal cannula.    Sepsis ([facility])- (present on admission)  Assessment & Plan  Patient sepsis has resolved  Initial sepsis most likely secondary to COVID-19 pneumonia  Secondary bacterial pneumonia was also highly suspected as procalcitonin levels were high  Antibiotics were utilized and at this point patient's infection is resolving.    COVID-19 virus infection- (present on admission)  Assessment & Plan  Patient at this point is improving from COVID-19 infection  Patient with high risk because of underlying medical condition including COPD was treated aggressively with steroids.  She seems to be at this point improving    Acute exacerbation of chronic obstructive pulmonary disease (COPD) ([facility])- (present on admission)  Assessment & Plan  Continued on steroids  Nebulizer treatment + RT protocol  Oxygen support    Arterial hypotension  Assessment & Plan  Resolved  Most recent blood pressure 146/65.    Type 2 diabetes mellitus with hyperglycemia, without long-term current use of insulin ([facility])- (present on admission)  Assessment & Plan  -accus with sliding scale coverage  -diabetic diet  -diabetic education  -follow glycohemoglobin levels long term  -continue with oral antihyperglycemics  -monitor for hypoglycemic episodes and adjust control if he should get low    Anxiety  Assessment & Plan  Chronic anxiety at this point seems to be controlled.    Normocytic anemia- (present on admission)  Assessment & Plan  Monitor H&H approximal 721 transfuse  Obtain iron level, B12 level and folic acid  level    Depression- (present on admission)  Assessment & Plan  Patient is on Wellbutrin, Lexapro and Remeron at home    Leukocytosis- (present on admission)  Assessment & Plan  Secondary to initial infection and this point this is resolving.    Dyslipidemia- (present on admission)  Assessment & Plan  Low-fat low-cholesterol diet  Statin  Obtain fasting cholesterol panel              VTE prophylaxis: SCDs/TEDs    I have performed a physical exam and reviewed and updated ROS and Plan today (10/19/2022). In review of yesterday's note (10/18/2022), there are no changes except as documented above.

## 2022-10-19 NOTE — RESPIRATORY CARE
"  COPD EDUCATION by COPD CLINICAL EDUCATOR  10/19/2022 at 2:35 PM by Giana Singleton, RRT     Patient seen for COPD/COVID/PNA readmission education. Patient completed our program upon last admission, but needed a refresher on certain topics. Discussion included: determination of the factors that led to their current hospital admission, reiteration of the Action Plan and steps they can take to avoid an exacerbation, proper medication technique, smoking cessation (if applicable), and importance of obtaining a doctors appointment when they start feeling ill. Question and answer session followed.  Provided spacer and aerobika with instruction for use, care and cleaning. A personalized \"COPD Action Plan\" was reviewed with and provided to the patient.     COPD Screen  COPD Risk Screening  Do you have a history of COPD?: Yes  Do you have a Pulmonologist?: Yes    COPD Assessment  COPD Clinical Specialists ONLY  COPD Education Initiated: Yes--Program Readmission (Pt in full program 11/16/21, pt here due to COVID, PNA and COPD, hypoxia, Readmit block done, action plan given, spacer, aerobika instruction and follow up appointments with Pulmonary and RX for Symbicort and Spiriva)  Physician Follow Up Appointment: 10/21/22  Appt Time: 1020  Physician Name: Physician Bowen Benton M.D.  Pulmonary Follow Up Appointment: 11/01/22  Appt Time: 1440  Pulmonologist Name: [facility]  Referrals Initiated: Yes  Pulmonary Rehab: N/A  Smoking Cessation: N/A  Hospice: N/A  Home Health Care: N/A  [facility] Outreach: N/A  [facility]: N/A  [facility]: [facility]  Private In-Home Care Agency: N/A  Is this a COPD exacerbation patient?: No  $ Demo/Eval of SVN's, MDI's and Aerosols: Yes  (OP) Pulmonary Function Testing: Yes    PFT Results    No results found for: PFT    Meds to Beds  Would the patient like to opt in for Bedside Medication Delivery at Discharge?: Yes, interested     MY COPD ACTION PLAN     It is " "recommended that patients and physicians /healthcare providers complete this action plan together. This plan should be discussed at each physician visit and updated as needed.    The green, yellow and red zones show groups of symptoms of COPD. This list of symptoms is not comprehensive, and you may experience other symptoms. In the \"Actions\" column, your healthcare provider has recommended actions for you to take based on your symptoms.    Patient Name: Stella Prieto   YOB: 1955   Last Updated on: 10/19/2022  2:33 PM   Green Zone:  I am doing well today Actions     Usual activitiy and exercise level   Take daily medications     Usual amounts of cough and phlegm/mucus   Use oxygen as prescribed     Sleep well at night   Continue regular exercise/diet plan     Appetite is good   At all times avoid cigarette smoke, inhaled irritants     Daily Medications (these medications are taken every day):   Tiotropium Bromide Monohydrate (Spiriva)  Budesonide-Formoterol Fumarate (Symbicort)  Albuterol (Accuneb, Proair, Proventil, Ventolin) 2 Puffs  2 Puffs  2 Puffs Once daily  Twice daily  Every 4 hours PRN     Additional Information:  Be sure to Rinse mouth out after use of medications  Use spacer with Symbicort AM and PM, and with Rescue inhaler!    Patient instructed on importance of cleaning home nebulizer after every treatment to prevent infection.       Use Nebulizer if needed    Yellow Zone:  I am having a bad day or a COPD flare Actions     More breathless than usual   Continue daily medications     I have less energy for my daily activities   Use quick relief inhaler as ordered     Increased or thicker phlegm/mucus   Use oxygen as prescribed     Using quick relief inhaler/nebulizer more often   Get plenty of rest     Swelling of ankles more than usual   Use pursed lip breathing     More coughing than usual   At all times avoid cigarette smoke, inhaled irritants     I feel like I have a \"chest cold\" "     Poor sleep and my symptoms woke me up     My appetite is not good     My medicine is not helping      Call provider immediately if symptoms don’t improve     Continue daily medications, add rescue medications:   Albuterol 3mL via nebulizer Every 4 hours       Medications to be used during a flare up, (as Discussed with Provider):   Prednisone       Additional Information:  Pt takes 50 mg Prednisone daily.    If really short of breath it's best to use the nebulizer.    If really congested use the Airobika flutter with the nebulizer for combined therapy.    Red Zone:  I need urgent medical care Actions     Severe shortness of breath even at rest   Call 911 or seek medical care immediately     Not able to do any activity because of breathing      Fever or shaking chills      Feeling confused or very drowsy       Chest pains      Coughing up blood

## 2022-10-19 NOTE — DISCHARGE PLANNING
Case Management Discharge Planning    Admission Date: 10/17/2022  GMLOS: 5  ALOS: 2    6-Clicks ADL Score: 17  6-Clicks Mobility Score: 13    Anticipated Discharge Dispo: Discharge Disposition: Discharged to home/self care (01)      Action(s) Taken: RNCM participated in IDT rounds. Patient needs to be established with PCP in [place]. RNCM placed call to . Patient has been scheduled with Bowen Benton on 10/21 @ 1005. Information will be on AVS.     Escalations Completed: None    Medically Clear: No    Next Steps: Patient is currently pending PT/OT eval for possible need of HH and will possibly need O2 at home. RNCM will continue to follow for these needs. 2nd IMM delivered.       Barriers to Discharge: Medical clearance    Is the patient up for discharge tomorrow: Yes

## 2022-10-19 NOTE — CARE PLAN
The patient is Watcher - Medium risk of patient condition declining or worsening    Shift Goals  Clinical Goals: MOnitor O2. maintain above 90%  Patient Goals: pain control  Family Goals: SHASHA    Progress made toward(s) clinical / shift goals:      Patient is back to baseline, tolerating 3L of oxygen with nasal cannula. Pt did complain of bladder pain, MD notified. New orders placed. Will continue to monitor urine output and clarity/odor.     Problem: Knowledge Deficit - Standard  Goal: Patient and family/care givers will demonstrate understanding of plan of care, disease process/condition, diagnostic tests and medications  Outcome: Progressing     Problem: Knowledge Deficit - COPD  Goal: Patient/significant other demonstrates understanding of disease process, utilization of the Action Plan, medications and discharge instruction  Outcome: Progressing     Problem: Risk for Infection - COPD  Goal: Patient will remain free from signs and symptoms of infection  Outcome: Progressing     Problem: Nutrition - Advanced  Goal: Patient will display progressive weight gain toward goal have adequate food and fluid intake  Outcome: Progressing     Problem: Ineffective Airway Clearance  Goal: Patient will maintain patent airway with clear/clearing breath sounds  Outcome: Progressing     Problem: Impaired Gas Exchange  Goal: Patient will demonstrate improved ventilation and adequate oxygenation and participate in treatment regimen within the level of ability/situation.  Outcome: Progressing     Problem: Risk for Aspiration  Goal: Patient's risk for aspiration will be absent or decrease  Outcome: Progressing     Problem: Self Care  Goal: Patient will have the ability to perform ADLs independently or with assistance (bathe, groom, dress, toilet and feed)  Outcome: Progressing     Problem: Psychosocial  Goal: Patient's ability to verbalize feelings about condition will improve  Outcome: Progressing     Problem: Hemodynamics  Goal:  Patient's hemodynamics, fluid balance and neurologic status will be stable or improve  Outcome: Progressing       Patient is not progressing towards the following goals:

## 2022-10-19 NOTE — CARE PLAN
The patient is Watcher - Medium risk of patient condition declining or worsening    Shift Goals  Clinical Goals: maintain o2 > 88% and pain management  Patient Goals: sleep  Family Goals: SHASHA    Progress made toward(s) clinical / shift goals:  Patient pain is controlled and managed per MAR, O2 sat remained >88%, and continued on 3 L of oxygen. Pt is resting, comfort measures and fall precautions in place, and call light within reach.     Patient is not progressing towards the following goals:     Problem: Knowledge Deficit - Standard  Goal: Patient and family/care givers will demonstrate understanding of plan of care, disease process/condition, diagnostic tests and medications  Outcome: Progressing     Problem: Pain - Standard  Goal: Alleviation of pain or a reduction in pain to the patient’s comfort goal  Outcome: Progressing

## 2022-10-20 VITALS
HEART RATE: 86 BPM | TEMPERATURE: 98 F | SYSTOLIC BLOOD PRESSURE: 142 MMHG | RESPIRATION RATE: 19 BRPM | WEIGHT: 178.57 LBS | DIASTOLIC BLOOD PRESSURE: 80 MMHG | BODY MASS INDEX: 35.06 KG/M2 | HEIGHT: 60 IN | OXYGEN SATURATION: 93 %

## 2022-10-20 LAB
GLUCOSE BLD STRIP.AUTO-MCNC: 80 MG/DL (ref 65–99)
GLUCOSE BLD STRIP.AUTO-MCNC: 95 MG/DL (ref 65–99)

## 2022-10-20 PROCEDURE — 94760 N-INVAS EAR/PLS OXIMETRY 1: CPT

## 2022-10-20 PROCEDURE — 99239 HOSP IP/OBS DSCHRG MGMT >30: CPT | Performed by: HOSPITALIST

## 2022-10-20 PROCEDURE — 82962 GLUCOSE BLOOD TEST: CPT

## 2022-10-20 PROCEDURE — 700111 HCHG RX REV CODE 636 W/ 250 OVERRIDE (IP): Performed by: INTERNAL MEDICINE

## 2022-10-20 PROCEDURE — 94640 AIRWAY INHALATION TREATMENT: CPT

## 2022-10-20 PROCEDURE — A9270 NON-COVERED ITEM OR SERVICE: HCPCS | Performed by: INTERNAL MEDICINE

## 2022-10-20 PROCEDURE — A9270 NON-COVERED ITEM OR SERVICE: HCPCS | Performed by: HOSPITALIST

## 2022-10-20 PROCEDURE — 700102 HCHG RX REV CODE 250 W/ 637 OVERRIDE(OP): Performed by: INTERNAL MEDICINE

## 2022-10-20 PROCEDURE — 700102 HCHG RX REV CODE 250 W/ 637 OVERRIDE(OP): Performed by: HOSPITALIST

## 2022-10-20 RX ORDER — GUAIFENESIN 200 MG/10ML
10 LIQUID ORAL EVERY 4 HOURS PRN
Status: DISCONTINUED | OUTPATIENT
Start: 2022-10-20 | End: 2022-10-20 | Stop reason: HOSPADM

## 2022-10-20 RX ORDER — BENZONATATE 100 MG/1
100 CAPSULE ORAL 3 TIMES DAILY PRN
Status: DISCONTINUED | OUTPATIENT
Start: 2022-10-20 | End: 2022-10-20 | Stop reason: HOSPADM

## 2022-10-20 RX ORDER — DEXAMETHASONE 4 MG/1
6 TABLET ORAL DAILY
Status: DISCONTINUED | OUTPATIENT
Start: 2022-10-20 | End: 2022-10-20 | Stop reason: HOSPADM

## 2022-10-20 RX ORDER — OXYCODONE HYDROCHLORIDE 5 MG/1
5 TABLET ORAL EVERY 4 HOURS PRN
Qty: 30 TABLET | Refills: 0 | Status: SHIPPED | OUTPATIENT
Start: 2022-10-20 | End: 2022-10-24

## 2022-10-20 RX ORDER — GUAIFENESIN 200 MG/10ML
10 LIQUID ORAL EVERY 4 HOURS PRN
Qty: 840 ML | Refills: 1 | Status: SHIPPED | OUTPATIENT
Start: 2022-10-20

## 2022-10-20 RX ORDER — CYCLOBENZAPRINE HCL 10 MG
10 TABLET ORAL 3 TIMES DAILY PRN
Qty: 30 TABLET | Refills: 0 | Status: SHIPPED | OUTPATIENT
Start: 2022-10-20 | End: 2022-11-08 | Stop reason: SDUPTHER

## 2022-10-20 RX ORDER — OXYCODONE HYDROCHLORIDE 5 MG/1
5 TABLET ORAL EVERY 4 HOURS PRN
Status: DISCONTINUED | OUTPATIENT
Start: 2022-10-20 | End: 2022-10-20 | Stop reason: HOSPADM

## 2022-10-20 RX ORDER — DEXAMETHASONE 6 MG/1
6 TABLET ORAL DAILY
Qty: 10 TABLET | Refills: 0 | Status: SHIPPED | OUTPATIENT
Start: 2022-10-20 | End: 2022-10-24

## 2022-10-20 RX ADMIN — OXYCODONE 5 MG: 5 TABLET ORAL at 05:12

## 2022-10-20 RX ADMIN — DEXAMETHASONE 6 MG: 4 TABLET ORAL at 09:12

## 2022-10-20 RX ADMIN — ATORVASTATIN CALCIUM 20 MG: 20 TABLET, FILM COATED ORAL at 05:08

## 2022-10-20 RX ADMIN — TIOTROPIUM BROMIDE INHALATION SPRAY 5 MCG: 3.12 SPRAY, METERED RESPIRATORY (INHALATION) at 07:23

## 2022-10-20 RX ADMIN — BUPROPION HYDROCHLORIDE 150 MG: 100 TABLET, FILM COATED ORAL at 05:08

## 2022-10-20 RX ADMIN — BENZONATATE 100 MG: 100 CAPSULE ORAL at 05:08

## 2022-10-20 RX ADMIN — GUAIFENESIN 200 MG: 100 SOLUTION ORAL at 05:08

## 2022-10-20 RX ADMIN — BUDESONIDE AND FORMOTEROL FUMARATE DIHYDRATE 2 PUFF: 160; 4.5 AEROSOL RESPIRATORY (INHALATION) at 07:23

## 2022-10-20 RX ADMIN — CYCLOBENZAPRINE 10 MG: 10 TABLET, FILM COATED ORAL at 05:08

## 2022-10-20 RX ADMIN — METHYLPREDNISOLONE SODIUM SUCCINATE 40 MG: 40 INJECTION, POWDER, FOR SOLUTION INTRAMUSCULAR; INTRAVENOUS at 05:08

## 2022-10-20 ASSESSMENT — PAIN DESCRIPTION - PAIN TYPE
TYPE: ACUTE PAIN

## 2022-10-20 NOTE — DOCUMENTATION QUERY
[facility]                                                                       Query Response Note      PATIENT:               QUINN RAMIREZ  ACCT #:                  [id number]  MRN:                     [id number]  :                      1955  ADMIT DATE:       10/17/2022 3:44 AM  DISCH DATE:          RESPONDING  PROVIDER #:        [id number]           QUERY TEXT:    Sepsis with source of COVID 19 virus infection, possible bacterial pneumonia per H&P.  Per progress notes starting on 10/17, the diagnosis of sepsis is dropped and it is documented that luekocytosis is from steroids with no signs of bacterial infection.  Can the diagnosis of sepsis be further specified     NOTE:  If an appropriate response is not listed below, please respond with a new note.  please include query response in progress notes and/or DC summary      The patient's Clinical Indicators include:  - Findings:  H&P:  sepsis POA, source is COVID 19 virus infection, possible bacterial PNA (criteria:  HR > 90 and WBC >12,000)     - Admit labs:  WBC 17.0?10.9,  lactic acid 1.0, procalcitonin 0.06, immature granulocytes 2.20, neutrophils abs. 15.60    - Admit vitals:  T 96.7, RR 25, 34, 17, 22,  HR 92, 93, 85, /79    - Progress notes 10/17 - 10/18:  leukocytosis from steroids, no signs of bacterial infection    - Treatments:  IVF, antibiotics, cultures, trend lactic acid level     - Risk factors:  COVID, acute respiratory failure, COPD with exacerbation    Thank You,  Carey Curry RN  Clinical Documentation   [email or web address]  Connect via [email or web address]  Options provided:   -- Sepsis confirmed   -- Suspected sepsis on admission; sepsis ruled out, SIRS is an expected finding of COVID 19 with acute respiratory failure   -- Other explanation, Please specify other explanation   -- Unable to determine      Query created by: Carey Curry on 10/18/2022  12:28 PM    RESPONSE TEXT:    Suspected sepsis on admission; sepsis ruled out, SIRS is an expected finding of COVID 19 with acute respiratory failure       QUERY TEXT:    Encephalopathy is documented in the Medical Record. Condition is reported as resolved with improvement from the BIPAP per progress notes.  Patient is also with possible sepsis and bacterial pneumonia as well as COVID 19.  Progress note 10/17 documents that leukocytosis was from steroids and there is no sign of bacterial infection.   Please specify type of encephalopathy present     NOTE:  If an appropriate response is not listed below, please respond with a new note.   please include query response in your progress notes and/or DC summary    The patient's Clinical Indicators include:  - Findings:  H&P: acute encephalopathy, Now somnolent and nonverbal, however upon arrival patient was noted to be confused, laughing at inappropriate times and not answering questions    - Progress note 10/17/22:  Acute encephalopathy, Resolved with improvement from the BIPAP  Now A and A and O * 4    - Treatments:  antibiotics, BIPAP, 02, ABG's to monitor worsening hypercapnia, CXR     - Risk factors:  Acute respiratory failure with hypoxia and hypercapnia, possible bacterial pneumonia, COVID 19, sepsis     Thank You,  Carey Curry RN  Clinical Documentation   [email or web address]  Connect via [employer] Messenger  Options provided:   -- Anoxic encephalopathy   -- Metabolic encephalopathy   -- Other type of encephalopathy (please specify type)   -- Other explanation, Please specify other explanation   -- Unable to determine      Query created by: Carey Curry on 10/18/2022 12:44 PM    RESPONSE TEXT:    Metabolic encephalopathy          Electronically signed by:  RYANNE CISNEROS MD 10/20/2022 7:22 AM

## 2022-10-20 NOTE — PROGRESS NOTES
Received report from Miriam ACHARYA. Pt AOx4 and reports 8/10 pain. Pt updated on Poc for the day. Pt has no further questions or needs at this time.

## 2022-10-20 NOTE — RESPIRATORY CARE
REMOTE MONITORING PROGRAM by RESPIRATORY THERAPY  10/20/2022 at 1:19 PM by Giana Singleton, RRT     Patient does not qualify for Remote Monitoring program. Patient lives in [place].

## 2022-10-20 NOTE — DISCHARGE SUMMARY
Discharge Summary    CHIEF COMPLAINT ON ADMISSION  No chief complaint on file.      Reason for Admission  COOPD Exacerbation, Respiratory Fa*     Admission Date  10/17/2022    CODE STATUS  DNAR/DNI    HPI & HOSPITAL COURSE  Ms. Stella Thurman is a 67-year-old female who comes to us from [facility] because of shortness of breath.  The patient has underlying COPD and is on 2 to 4 L of oxygen at home by nasal cannula.  With her worsening respiratory status patient was evaluated found to have COVID-19 infection.  Patient was initially admitted to the medical floor and had to be transferred to the ICU due to worsening respiratory failure.  The patient was found to have uncompensated hypercarbic respiratory failure and had to be placed on BiPAP.  Patient was placed on steroids nebulizer treatment oxygen support and was able to slowly be weaned off BiPAP and transition over to high flow by nasal cannula.  Initially patient also required pressor support.  Once Levophed was able to be weaned off and patient was able to be taken down on her oxygen needs she was able to be transferred to the medical floor.  Currently patient is requiring 2 to 4 L of oxygen by nasal cannula and is pretty close to her baseline.  She requires continued oxygen monitoring we will set her up with remote monitoring.  She also has been optimized on nebulizer treatments.  She has been set up with outpatient follow-up with pulmonology as well as a primary care physician.  Patient otherwise has been stable for the past 24 hours and can most discharge home with outpatient follow-ups and monitoring.    Therefore, she is discharged in good and stable condition to home with close outpatient follow-up.    The patient met 2-midnight criteria for an inpatient stay at the time of discharge.    Discharge Date  10/20/2022    FOLLOW UP ITEMS POST DISCHARGE  Follow-up with the primary care physician in 2 to 3 days    DISCHARGE DIAGNOSES  Principal  Problem:    Acute respiratory failure with hypoxia and hypercapnia (HCC) POA: Yes  Active Problems:    Acute exacerbation of chronic obstructive pulmonary disease (COPD) (HCC) POA: Yes    COVID-19 virus infection POA: Yes    Sepsis (HCC) POA: Yes    Type 2 diabetes mellitus with hyperglycemia, without long-term current use of insulin (HCC) POA: Yes    Arterial hypotension POA: Unknown    Dyslipidemia (Chronic) POA: Yes    Leukocytosis POA: Yes    Depression POA: Yes    Normocytic anemia POA: Yes    Anxiety POA: Unknown  Resolved Problems:    Acute encephalopathy POA: Yes      FOLLOW UP  Future Appointments   Date Time Provider Department Center   10/21/2022 10:20 AM Bowen Benton M.D. [facility]   10/24/2022 10:00 AM Eleni Benites M.D. [facility] None     No follow-up provider specified.    MEDICATIONS ON DISCHARGE     Medication List        START taking these medications        Instructions   budesonide-formoterol 160-4.5 MCG/ACT Aero  Commonly known as: SYMBICORT   Inhale 2 Puffs 2 times a day.  Dose: 2 Puff     cyclobenzaprine 10 mg Tabs  Commonly known as: Flexeril   Take 1 Tablet by mouth 3 times a day as needed for Muscle Spasms.  Dose: 10 mg     dexamethasone 6 MG Tabs  Commonly known as: DECADRON   Take 1 Tablet by mouth every day.  Dose: 6 mg     guaiFENesin 100 MG/5ML liquid  Commonly known as: Robitussin   Take 10 mL by mouth every four hours as needed for Cough.  Dose: 10 mL            CHANGE how you take these medications        Instructions   albuterol 108 (90 Base) MCG/ACT Aers inhalation aerosol  What changed: Another medication with the same name was removed. Continue taking this medication, and follow the directions you see here.   Inhale 2 Puffs every four hours as needed for Shortness of Breath.  Dose: 2 Puff     atorvastatin 40 MG Tabs  What changed:   how much to take  when to take this  Commonly known as: LIPITOR   Take 1 Tablet by mouth every evening.  Dose: 40 mg            CONTINUE  taking these medications        Instructions   aspirin 81 MG Chew chewable tablet  Commonly known as: ASA   Chew 1 Tablet every day.  Dose: 81 mg     buPROPion 75 MG Tabs  Commonly known as: WELLBUTRIN   Take 150 mg by mouth every morning.  Dose: 150 mg     escitalopram 10 MG Tabs  Commonly known as: Lexapro   Take 10 mg by mouth every evening.  Dose: 10 mg     ipratropium-albuterol  MCG/ACT Aers  Commonly known as: COMBIVENT RESPIMAT   Inhale 1 Puff 4 times a day.  Dose: 1 Puff     LORazepam 0.5 MG Tabs  Commonly known as: ATIVAN   Take 0.25 mg by mouth every evening as needed for Anxiety.  Dose: 0.25 mg     mirtazapine 30 MG Tabs tablet  Commonly known as: Remeron   Take 30 mg by mouth every evening.  Dose: 30 mg     tiotropium 2.5 mcg/Act Aers  Commonly known as: Spiriva Respimat   Inhale 2 Inhalation every day.  Dose: 5 mcg     topiramate 25 MG Tabs  Commonly known as: TOPAMAX   Take 75 mg by mouth every evening.  Dose: 75 mg     traZODone 100 MG Tabs  Commonly known as: DESYREL   Take 100 mg by mouth every evening as needed for Sleep.  Dose: 100 mg            STOP taking these medications      predniSONE 20 MG Tabs  Commonly known as: DELTASONE              Allergies  Allergies   Allergen Reactions    Sulfa Drugs     Tetracycline        DIET  Orders Placed This Encounter   Procedures    Diet Order Diet: Consistent CHO (Diabetic)     Standing Status:   Standing     Number of Occurrences:   1     Order Specific Question:   Diet:     Answer:   Consistent CHO (Diabetic) [4]       ACTIVITY  As tolerated.  Weight bearing as tolerated    CONSULTATIONS  Critical care/pulmonology    PROCEDURES  Pressor support, BiPAP ventilation    LABORATORY  Lab Results   Component Value Date    SODIUM 144 10/18/2022    POTASSIUM 4.2 10/18/2022    CHLORIDE 102 10/18/2022    CO2 33 10/18/2022    GLUCOSE 101 (H) 10/18/2022    BUN 29 (H) 10/18/2022    CREATININE 0.90 10/18/2022        Lab Results   Component Value Date    WBC 19.6  (H) 10/19/2022    HEMOGLOBIN 13.7 10/19/2022    HEMATOCRIT 44.9 10/19/2022    PLATELETCT 393 10/19/2022        Total time of the discharge process exceeds 37 minutes.

## 2022-10-20 NOTE — PROGRESS NOTES
Dr Elizalde rounded this morning and discharged her home; he was sending narcotics to [facility] here in [facility].    Morning assessment done about 0910 after giving first dose of decadron.  When talking about discharge, pt had not spoken to her daughter.  Called Esha and she said that she was going to find a  for his kids and then she would be on her way; planning on discharging about [id number].  Pt states she is sleepy as she didn't get good sleep and that her pain is better.

## 2022-10-20 NOTE — THERAPY
Occupational Therapy     Patient Name: Stella Prieto  Age:  67 y.o., Sex:  female  Medical Record #: [id number]  Today's Date: 10/20/2022    Discussed therapy orders with RN, PT       10/20/22 1001   Interdisciplinary Plan of Care Collaboration   Collaboration Comments Per RN pt is at functional baseline, indep with self cares.  Being discharged home, no OT eval needed.  Will defer OT eval.

## 2022-10-20 NOTE — THERAPY
Physical Therapy Contact Note    Patient Name: Stella Prieto  Age:  67 y.o., Sex:  female  Medical Record #: [id number]  Today's Date: 10/20/2022    Per RN, pt is about to DC home and is at her functional baseline. Pt does not require PT services at this time. Will complete orders.    Mary Carrasco, PT, DPT  [phone]

## 2022-10-20 NOTE — PROGRESS NOTES
Telemetry Shift Summary    Rhythm SR  HR Range 82-95  Ectopy oPAC, FpVC rTrig  Measurements .16/.08/.38        Normal Values  Rhythm SR  HR Range    Measurements 0.12-0.20 / 0.06-0.10  / 0.30-0.52

## 2022-10-20 NOTE — PROGRESS NOTES
Monitor Summary     Rhythm:SR 92-95  Measurements: 0.16/0.08/0.36  ECTOPIES: fPAC, rPVC, rBIG, oTRIG        Normal Values  Rhythm SR  HR Range    Measurements 0.12-0.20 / 0.06-0.10  / 0.30-0.52

## 2022-10-20 NOTE — DISCHARGE PLANNING
Case Management Discharge Planning        Anticipated Discharge Dispo: Discharge Disposition: Discharged to home/self care (01)    DME Needed: No    Action(s) Taken: LSW completed chart review. Discussed pt in IDT rounds. Per MD, dtr is picking up pt. Per MD, pt has oxygen and is at baseline.     Escalations Completed: None    Medically Clear: Yes    Next Steps: LSW to follow and assist as needed.    Barriers to Discharge: None    Is the patient up for discharge tomorrow: No, today.

## 2022-10-20 NOTE — CARE PLAN
The patient is Stable - Low risk of patient condition declining or worsening    Shift Goals  Clinical Goals: Monitor  Patient Goals: pain control  Family Goals: SHASHA    Progress made toward(s) clinical / shift goals:    Problem: Knowledge Deficit - Standard  Goal: Patient and family/care givers will demonstrate understanding of plan of care, disease process/condition, diagnostic tests and medications  Outcome: Progressing: pt verbalized understanding of POC     Problem: Skin Integrity  Goal: Skin integrity is maintained or improved  Outcome: Progressing: pt free from skin injury     Problem: Fall Risk  Goal: Patient will remain free from falls  Outcome: Progressing: pt free from falls throughout shift

## 2022-10-22 LAB
BACTERIA BLD CULT: NORMAL
BACTERIA BLD CULT: NORMAL
SIGNIFICANT IND 70042: NORMAL
SIGNIFICANT IND 70042: NORMAL
SITE SITE: NORMAL
SITE SITE: NORMAL
SOURCE SOURCE: NORMAL
SOURCE SOURCE: NORMAL

## 2022-10-24 ENCOUNTER — OFFICE VISIT (OUTPATIENT)
Dept: SLEEP MEDICINE | Facility: MEDICAL CENTER | Age: 67
End: 2022-10-24
Payer: MEDICARE

## 2022-10-24 VITALS
DIASTOLIC BLOOD PRESSURE: 84 MMHG | HEIGHT: 60 IN | SYSTOLIC BLOOD PRESSURE: 120 MMHG | HEART RATE: 87 BPM | OXYGEN SATURATION: 92 % | BODY MASS INDEX: 33.65 KG/M2 | WEIGHT: 171.38 LBS

## 2022-10-24 DIAGNOSIS — J96.22 ACUTE ON CHRONIC RESPIRATORY FAILURE WITH HYPOXIA AND HYPERCAPNIA (HCC): ICD-10-CM

## 2022-10-24 DIAGNOSIS — J96.21 ACUTE ON CHRONIC RESPIRATORY FAILURE WITH HYPOXIA AND HYPERCAPNIA (HCC): ICD-10-CM

## 2022-10-24 PROCEDURE — 99205 OFFICE O/P NEW HI 60 MIN: CPT | Performed by: STUDENT IN AN ORGANIZED HEALTH CARE EDUCATION/TRAINING PROGRAM

## 2022-10-24 RX ORDER — PREDNISONE 5 MG/1
TABLET ORAL
Qty: 105 TABLET | Refills: 0 | Status: SHIPPED | OUTPATIENT
Start: 2022-10-24

## 2022-10-24 ASSESSMENT — ENCOUNTER SYMPTOMS
FEVER: 0
SPUTUM PRODUCTION: 1
VOMITING: 0
NAUSEA: 0
CHILLS: 0
COUGH: 1
WHEEZING: 1
FOCAL WEAKNESS: 0
EYE REDNESS: 0
SHORTNESS OF BREATH: 1
HEMOPTYSIS: 0
FALLS: 0
WEIGHT LOSS: 1

## 2022-10-24 ASSESSMENT — FIBROSIS 4 INDEX: FIB4 SCORE: 0.45

## 2022-10-24 NOTE — PROGRESS NOTES
Pulmonary Clinic Note    Chief Complaint:  Chief Complaint   Patient presents with    Hospital Follow-up     ED 10/17-10/20. Pulm consult: 10/17/22    Other     CXR 10/17/22     HPI:   Stella Prieto is a very pleasant 67 y.o. female with history of tobacco smoking 40 pkyr quit 7/2022, MI, prior CVA, reported history of COPD (no PFTs or CT chest) who presents to pulmonary clinic for post-hospital follow-up.     Patient was hospitalized recently in 10/2022 for acute respiratory failure secondary to COVID infection.  She was noted to have some hypercapnia but it was thought to be most likely secondary to her OHS and fatigue from respiratory distress rather than COPD since she does not have an official diagnosis of COPD.     Patient states that she was diagnosed with COPD several years ago and has been on inhalers intermittently for the past 5 years.  She has been using supplemental oxygen for the past year and has been given several doses of steroids and has been on 50 mg prednisone continuously for the past year.  She was diagnosed with sleep apnea in the past but did not want to use a CPAP mask so she uses supplemental oxygen at night.  Since her hospital discharge last week, she is now off all steroids -states she was not given a taper.  She has been feeling worse since she went home and is currently on 3 L of O2.  She is unable to walk more than a few steps without significant dyspnea and barely does any activities at home due to this.   She states that she is currently really motivated to improve her health since that has deteriorated significantly in the last year or so-she would like a sleep reevaluation and agrees to use CPAP if she needs it because she understands her significantly her quality of life has been affected recently.       Past Medical History:   Diagnosis Date    COPD (chronic obstructive pulmonary disease) (HCC)     Cough     Diabetes (HCC)     Hypertension     Painful breathing      Shortness of breath     Sputum production     ST elevation myocardial infarction involving left anterior descending (LAD) coronary artery ([facility]) 2021    ST elevation myocardial infarction involving left anterior descending (LAD) coronary artery ([facility]) 2021    Wheezing        History reviewed. No pertinent surgical history.    Social History     Socioeconomic History    Marital status: Single     Spouse name: Not on file    Number of children: Not on file    Years of education: Not on file    Highest education level: Not on file   Occupational History    Not on file   Tobacco Use    Smoking status: Former     Packs/day: 0.75     Types: Cigarettes     Quit date: 2022     Years since quittin.3    Smokeless tobacco: Never    Tobacco comments:     Recently quitting, 1 pack lasted 2 months.  Prior to that was .75 of a pack   Vaping Use    Vaping Use: Some days    Substances: Nicotine    Devices: Disposable, Pre-filled pod   Substance and Sexual Activity    Alcohol use: Not Currently     Comment: reportedly    Drug use: Not Currently     Types: Marijuana    Sexual activity: Not on file   Other Topics Concern    Not on file   Social History Narrative    Not on file     Social Determinants of Health     Financial Resource Strain: Not on file   Food Insecurity: Not on file   Transportation Needs: Not on file   Physical Activity: Not on file   Stress: Not on file   Social Connections: Not on file   Intimate Partner Violence: Not on file   Housing Stability: Not on file          Family History   Problem Relation Age of Onset    Heart Disease Father 64         of MI     There is no pertinent family history.     Current Outpatient Medications on File Prior to Visit   Medication Sig Dispense Refill    cyclobenzaprine (FLEXERIL) 10 mg Tab Take 1 Tablet by mouth 3 times a day as needed for Muscle Spasms. 30 Tablet 0    guaiFENesin (ROBITUSSIN) 100 MG/5ML liquid Take 10 mL by mouth every four hours as needed for  Cough. 840 mL 1    tiotropium (SPIRIVA RESPIMAT) 2.5 mcg/Act Aero Soln Inhale 2 Inhalation every day. 1 Each 3    budesonide-formoterol (SYMBICORT) 160-4.5 MCG/ACT Aerosol Inhale 2 Puffs 2 times a day. 1 Each 3    mirtazapine (REMERON) 30 MG Tab tablet Take 30 mg by mouth every evening.      aspirin (ASA) 81 MG Chew Tab chewable tablet Chew 1 Tablet every day. 60 Tablet 0    atorvastatin (LIPITOR) 40 MG Tab Take 1 Tablet by mouth every evening. 60 Tablet 0    albuterol 108 (90 Base) MCG/ACT Aero Soln inhalation aerosol Inhale 2 Puffs every four hours as needed for Shortness of Breath. 8.5 g 1    escitalopram (LEXAPRO) 10 MG Tab Take 10 mg by mouth every evening.      topiramate (TOPAMAX) 25 MG Tab Take 75 mg by mouth every evening.      buPROPion (WELLBUTRIN) 75 MG Tab Take 75 mg by mouth every morning.      traZODone (DESYREL) 100 MG Tab Take 50 mg by mouth every evening as needed for Sleep.      oxyCODONE immediate-release (ROXICODONE) 5 MG Tab Take 1 Tablet by mouth every four hours as needed for Severe Pain for up to 7 days. (Patient not taking: Reported on 10/24/2022) 30 Tablet 0    LORazepam (ATIVAN) 0.5 MG Tab Take 0.25 mg by mouth every evening as needed for Anxiety. (Patient not taking: Reported on 10/24/2022)      ipratropium-albuterol (COMBIVENT RESPIMAT)  MCG/ACT Aero Soln Inhale 1 Puff 4 times a day. (Patient not taking: Reported on 10/24/2022)       No current facility-administered medications on file prior to visit.       Allergies: Sulfa drugs and Tetracycline      ROS:   Review of Systems   Constitutional:  Positive for malaise/fatigue and weight loss. Negative for chills and fever.   Eyes:  Negative for redness.   Respiratory:  Positive for cough, sputum production, shortness of breath and wheezing. Negative for hemoptysis.    Cardiovascular:  Negative for chest pain.   Gastrointestinal:  Negative for nausea and vomiting.   Musculoskeletal:  Negative for falls.   Neurological:  Negative for  focal weakness.     Vitals:  /84 (BP Location: Left arm, Patient Position: Sitting, BP Cuff Size: Adult)   Pulse 87   Ht 1.524 m (5')   Wt 77.7 kg (171 lb 6 oz)   SpO2 92%     Physical Exam:  Physical Exam  Vitals and nursing note reviewed.   Constitutional:       General: She is not in acute distress.     Appearance: Normal appearance. She is not ill-appearing or toxic-appearing.   HENT:      Head: Normocephalic and atraumatic.      Nose: Nose normal.      Mouth/Throat:      Mouth: Mucous membranes are moist.   Eyes:      General: No scleral icterus.     Conjunctiva/sclera: Conjunctivae normal.   Cardiovascular:      Rate and Rhythm: Normal rate and regular rhythm.   Pulmonary:      Effort: Pulmonary effort is normal. No respiratory distress.      Breath sounds: No wheezing.   Abdominal:      Palpations: Abdomen is soft.   Musculoskeletal:         General: No deformity or signs of injury. Normal range of motion.      Cervical back: Normal range of motion.   Skin:     General: Skin is warm and dry.   Neurological:      General: No focal deficit present.      Mental Status: She is alert. Mental status is at baseline.   Psychiatric:         Mood and Affect: Mood normal.         Behavior: Behavior normal.       Data:  Chest x-ray 10/17/2022: No acute abnormalities    Assessment/Plan:    Problem List Items Addressed This Visit       Acute on chronic respiratory failure with hypoxia and hypercapnia (HCC)     Concern for underlying COPD given extensive smoking history and her symptoms over the past few years.  Current episode is likely exacerbated by recent COVID infection.  Also has untreated MORENA and likely OHS.    -She has been on Pred 50 mg for the last year so we discussed that gradual taper of prednisone is the safest option at this time -I will prescribe a gradual taper over the next 4 weeks   -Recommend using Symbicort twice daily as instructed -was using it as needed previously and we discussed the  importance of using Symbicort as a maintenance inhaler daily -she understands and agrees  -Albuterol as needed  -She had PFTs done at [facility] at the end of 9/2022 -we will try to get these records and if unable to, will repeat PFTs here  -6MWT to assess oxygen requirements -unable to multi ox in clinic at this time since she is still coughing and was just diagnosed with COVID last week  -Sleep medicine referral         Relevant Medications    predniSONE (DELTASONE) 5 MG Tab    Other Relevant Orders    PULMONARY FUNCTION TESTS -Test requested: Complete Pulmonary Function Test    Exercise Test for Bronchospasm / 6-Minute Walk    Referral to Pulmonary and Sleep Medicine       Return In 4-5 weeks after steroid taper is finished.     This note was generated using voice recognition software which has a chance of producing errors of grammar and possibly content.  I have made every reasonable attempt to find and correct any obvious errors, but it should be expected that some may not be found prior to finalization of this note.    Time spent in record review prior to patient arrival, reviewing results, and in face-to-face encounter totaled 60 min, excluding any procedures if performed.    Eleni Benites MD  Pulmonary and Critical Care Medicine  [facility]

## 2022-10-24 NOTE — ASSESSMENT & PLAN NOTE
Concern for underlying COPD given extensive smoking history and her symptoms over the past few years.  Current episode is likely exacerbated by recent COVID infection.  Also has untreated MORENA and likely OHS.    -She has been on Pred 50 mg for the last year so we discussed that gradual taper of prednisone is the safest option at this time -I will prescribe a gradual taper over the next 4 weeks   -Recommend using Symbicort twice daily as instructed -was using it as needed previously and we discussed the importance of using Symbicort as a maintenance inhaler daily -she understands and agrees  -Albuterol as needed  -She had PFTs done at [place] at the end of 9/2022 -we will try to get these records and if unable to, will repeat PFTs here  -6MWT to assess oxygen requirements -unable to multi ox in clinic at this time since she is still coughing and was just diagnosed with COVID last week  -Sleep medicine referral

## 2022-11-08 ENCOUNTER — OFFICE VISIT (OUTPATIENT)
Dept: MEDICAL GROUP | Facility: PHYSICIAN GROUP | Age: 67
End: 2022-11-08
Payer: MEDICARE

## 2022-11-08 ENCOUNTER — HOSPITAL ENCOUNTER (OUTPATIENT)
Facility: MEDICAL CENTER | Age: 67
End: 2022-11-08
Attending: FAMILY MEDICINE
Payer: MEDICARE

## 2022-11-08 VITALS
HEART RATE: 112 BPM | HEIGHT: 63 IN | SYSTOLIC BLOOD PRESSURE: 140 MMHG | DIASTOLIC BLOOD PRESSURE: 78 MMHG | OXYGEN SATURATION: 88 % | BODY MASS INDEX: 29.64 KG/M2 | WEIGHT: 167.3 LBS | TEMPERATURE: 97.7 F

## 2022-11-08 DIAGNOSIS — J96.11 CHRONIC HYPOXEMIC RESPIRATORY FAILURE (HCC): ICD-10-CM

## 2022-11-08 DIAGNOSIS — F51.01 PRIMARY INSOMNIA: ICD-10-CM

## 2022-11-08 DIAGNOSIS — I25.10 CORONARY ARTERY DISEASE INVOLVING NATIVE CORONARY ARTERY OF NATIVE HEART WITHOUT ANGINA PECTORIS: ICD-10-CM

## 2022-11-08 DIAGNOSIS — F32.A DEPRESSION, UNSPECIFIED DEPRESSION TYPE: ICD-10-CM

## 2022-11-08 DIAGNOSIS — I10 PRIMARY HYPERTENSION: Chronic | ICD-10-CM

## 2022-11-08 DIAGNOSIS — M54.50 LOW BACK PAIN WITHOUT SCIATICA, UNSPECIFIED BACK PAIN LATERALITY, UNSPECIFIED CHRONICITY: ICD-10-CM

## 2022-11-08 DIAGNOSIS — E78.5 DYSLIPIDEMIA: Chronic | ICD-10-CM

## 2022-11-08 DIAGNOSIS — F41.9 ANXIETY: ICD-10-CM

## 2022-11-08 DIAGNOSIS — J44.9 CHRONIC OBSTRUCTIVE PULMONARY DISEASE, UNSPECIFIED COPD TYPE (HCC): ICD-10-CM

## 2022-11-08 DIAGNOSIS — Z23 NEED FOR VACCINATION: ICD-10-CM

## 2022-11-08 PROBLEM — J90 PLEURAL EFFUSION: Status: RESOLVED | Noted: 2021-11-15 | Resolved: 2022-11-08

## 2022-11-08 PROBLEM — R40.4 ALTERED LEVEL OF CONSCIOUSNESS: Status: RESOLVED | Noted: 2021-11-13 | Resolved: 2022-11-08

## 2022-11-08 PROBLEM — D64.9 NORMOCYTIC ANEMIA: Status: RESOLVED | Noted: 2022-10-17 | Resolved: 2022-11-08

## 2022-11-08 PROBLEM — D72.829 LEUKOCYTOSIS: Status: RESOLVED | Noted: 2021-11-13 | Resolved: 2022-11-08

## 2022-11-08 PROBLEM — J96.01 ACUTE RESPIRATORY FAILURE WITH HYPOXIA AND HYPERCAPNIA (HCC): Status: RESOLVED | Noted: 2021-11-13 | Resolved: 2022-11-08

## 2022-11-08 PROBLEM — J96.21 ACUTE ON CHRONIC RESPIRATORY FAILURE WITH HYPOXIA AND HYPERCAPNIA (HCC): Status: RESOLVED | Noted: 2022-10-24 | Resolved: 2022-11-08

## 2022-11-08 PROBLEM — U07.1 COVID-19 VIRUS INFECTION: Status: RESOLVED | Noted: 2022-10-17 | Resolved: 2022-11-08

## 2022-11-08 PROBLEM — J44.1 ACUTE EXACERBATION OF CHRONIC OBSTRUCTIVE PULMONARY DISEASE (COPD) (HCC): Status: RESOLVED | Noted: 2022-10-17 | Resolved: 2022-11-08

## 2022-11-08 PROBLEM — D75.839 THROMBOCYTOSIS: Status: RESOLVED | Noted: 2021-11-13 | Resolved: 2022-11-08

## 2022-11-08 PROBLEM — E11.65 TYPE 2 DIABETES MELLITUS WITH HYPERGLYCEMIA, WITHOUT LONG-TERM CURRENT USE OF INSULIN (HCC): Status: RESOLVED | Noted: 2021-11-13 | Resolved: 2022-11-08

## 2022-11-08 PROBLEM — I95.9 ARTERIAL HYPOTENSION: Status: RESOLVED | Noted: 2022-10-18 | Resolved: 2022-11-08

## 2022-11-08 PROBLEM — R53.1 ACUTE LEFT-SIDED WEAKNESS: Status: RESOLVED | Noted: 2021-11-13 | Resolved: 2022-11-08

## 2022-11-08 PROBLEM — N17.0 ACUTE RENAL FAILURE WITH TUBULAR NECROSIS (HCC): Status: RESOLVED | Noted: 2021-11-13 | Resolved: 2022-11-08

## 2022-11-08 PROBLEM — J96.22 ACUTE ON CHRONIC RESPIRATORY FAILURE WITH HYPOXIA AND HYPERCAPNIA (HCC): Status: RESOLVED | Noted: 2022-10-24 | Resolved: 2022-11-08

## 2022-11-08 PROBLEM — J18.9 PNEUMONIA DUE TO INFECTIOUS ORGANISM: Status: RESOLVED | Noted: 2021-11-13 | Resolved: 2022-11-08

## 2022-11-08 PROBLEM — I21.02 ST ELEVATION MYOCARDIAL INFARCTION INVOLVING LEFT ANTERIOR DESCENDING (LAD) CORONARY ARTERY (HCC): Status: RESOLVED | Noted: 2021-11-12 | Resolved: 2022-11-08

## 2022-11-08 PROBLEM — A41.9 SEPSIS (HCC): Status: RESOLVED | Noted: 2022-10-17 | Resolved: 2022-11-08

## 2022-11-08 PROBLEM — R41.0 DISORIENTATION: Status: RESOLVED | Noted: 2021-11-12 | Resolved: 2022-11-08

## 2022-11-08 PROBLEM — J96.02 ACUTE RESPIRATORY FAILURE WITH HYPOXIA AND HYPERCAPNIA (HCC): Status: RESOLVED | Noted: 2021-11-13 | Resolved: 2022-11-08

## 2022-11-08 PROBLEM — R57.9 SHOCK (HCC): Status: RESOLVED | Noted: 2021-11-13 | Resolved: 2022-11-08

## 2022-11-08 PROCEDURE — 82043 UR ALBUMIN QUANTITATIVE: CPT

## 2022-11-08 PROCEDURE — G0008 ADMIN INFLUENZA VIRUS VAC: HCPCS | Performed by: FAMILY MEDICINE

## 2022-11-08 PROCEDURE — 90662 IIV NO PRSV INCREASED AG IM: CPT | Performed by: FAMILY MEDICINE

## 2022-11-08 PROCEDURE — 82570 ASSAY OF URINE CREATININE: CPT

## 2022-11-08 PROCEDURE — 99214 OFFICE O/P EST MOD 30 MIN: CPT | Mod: 25 | Performed by: FAMILY MEDICINE

## 2022-11-08 RX ORDER — ATORVASTATIN CALCIUM 20 MG/1
20 TABLET, FILM COATED ORAL EVERY EVENING
Qty: 90 TABLET | Refills: 3 | Status: SHIPPED | OUTPATIENT
Start: 2022-11-08

## 2022-11-08 RX ORDER — CYCLOBENZAPRINE HCL 10 MG
10 TABLET ORAL 3 TIMES DAILY PRN
Qty: 30 TABLET | Refills: 3 | Status: SHIPPED | OUTPATIENT
Start: 2022-11-08

## 2022-11-08 ASSESSMENT — FIBROSIS 4 INDEX: FIB4 SCORE: 0.45

## 2022-11-08 NOTE — PROGRESS NOTES
CHIEF COMPLAINT / REASON FOR VISIT  Stella Prieto is a 67 y.o. female that presents today to establish care.    HISTORY OF PRESENT ILLNESS  Wants to get better, has two young grandsons.    Dyspnea, chronic with O2 req (2-3 L), aggravated by recent COVID infection and hospitalization. Chronic cough.    - currently vapes nicotine, quit smoking tobacco 2 years ago (40 pack year history)    Mood: anxiety (has fear of crowded places, agoraphobia) and depression      - sees psychiatrist every 6 weeks   - topiramate 75 mg daily   - wellbutrin 75 mg daily   - lexapro 10 mg daily   - mirtazepine for sleep    1.5 years ago, fell out of shower, hit toilet bowl with left lower back, was prescribed flexeril which was helpful, now she uses flexeril intermittently for various muscular pains.    Exercise: walks (tries to add 200 steps per day)    Past Medical History  Chronic hypoxemic respiratory failure presumed secondary to COPD and potentially obesity hypoventilation syndrome   - currently following with pulmonology. She is completing a prednisone tape (was on prednisone 50 mg daily for past year), is prescribed Symbicort BID, and was recommended sleep medicine referral for evaluation of possible sleep apnea    Coronary artery disease, history of STEMI in 2021 (cath noted nonobstructive coronary artery disease, attributed to stress-induced cardiomyopathy)    Hypertension    Hyperlipidemia    Moderate right internal carotid stenosis (50-69% on US 11/14/21)   - Left sided weakness during 2021 hospitalization (attributed to hypoperfusion in setting of hypotension and STEMI, MRI was negative for infarct)    Mild left internal carotid stenosis (<50% on US 11/14/2021)    Past Surgical History  hysterectomy    Social History  - Alcohol: none for 5 years  - currently vapes nicotine, quit smoking tobacco 2 years ago (40 pack year history)    OBJECTIVE    BP (!) 140/78 (BP Location: Left arm, Patient Position: Sitting, BP Cuff  "Size: Adult)   Pulse (!) 112   Temp 36.5 °C (97.7 °F) (Temporal)   Ht 1.6 m (5' 3\")   Wt 75.9 kg (167 lb 4.8 oz)   SpO2 88%   BMI 29.64 kg/m²  Body mass index is 29.64 kg/m².    PHYSICAL EXAM  Constitutional: Sitting comfortably, in no acute distress, responds to questions appropriately.  Head: Normocephalic  Eyes:  No conjunctival injection, no scleral icterus, PERRL  Mouth: Oral mucosa moist  Throat: Oropharynx clear without erythema or tonsillar exudates  Neck: No cervical lymphadenopathy  Heart: Regular S1 S2, no murmurs, rub, or gallops  Lungs: Clear to auscultation bilaterally, no wheezes, rales, or rhonchi  Extremities: trace bilateral lower extremity edema  Skin: Warm and dry    ASSESSMENT & PLAN  1. Chronic obstructive pulmonary disease, unspecified COPD type (HCC)  2. Chronic hypoxemic respiratory failure (HCC)  Chronic hypoxemic respiratory failure presumed secondary to COPD and potentially obesity hypoventilation syndrome   - currently following with pulmonology. She is completing a prednisone tape (was on prednisone 50 mg daily for past year), is prescribed Symbicort 2 puffs BID and spiriva inhaler 2 puffs daily, and was recommended sleep medicine referral for evaluation of possible sleep apnea. Currently on 2 to 3 L oxygen via nasal cannula    3. Dyslipidemia  4. Coronary artery disease involving native coronary artery of native heart without angina pectoris  History of STEMI during episode of sepsis and respiratory failure.  Had coronary catheterization which showed nonobstructive coronary disease.  Continue atorvastatin 20 mg daily and aspirin 81 mg daily  - atorvastatin (LIPITOR) 20 MG Tab; Take 1 Tablet by mouth every evening.  Dispense: 90 Tablet; Refill: 3    5. Primary hypertension  Not currently on any antihypertensives.  We will recheck at next visit in a couple of weeks, if still elevated will consider initiation of medication    6. Depression, unspecified depression type  7. " Anxiety  Chronic.  Follows with psychiatry every 6 weeks.  Currently taking bupropion 75 mg daily, Lexapro 10 mg daily, and topiramate 75 mg daily    8. Primary insomnia  Chronic, stable, continue mirtazapine 30 mg nightly and trazodone 50 mg nightly    9. Low back pain without sciatica, unspecified back pain laterality, unspecified chronicity  - cyclobenzaprine (FLEXERIL) 10 mg Tab; Take 1 Tablet by mouth 3 times a day as needed for Muscle Spasms.  Dispense: 30 Tablet; Refill: 3    10. Need for vaccination  - INFLUENZA VACCINE, HIGH DOSE (65+ ONLY)    Follow up in 1 month

## 2022-11-08 NOTE — LETTER
[facility]  Bowen Benton M.D.  [address]  [place] [address]  Fax: [phone]   Authorization for Release/Disclosure of   Protected Health Information   Name: QUINN RAMIREZ : 1955 SSN: [id number]   Address: [address] [address] Phone:    [phone] (home)    I authorize the entity listed below to release/disclose the PHI below to:   [facility]/Bowen Benton M.D. and Bowen Benton M.D.   Provider or Entity Name:  [facility]    Address   City, [facility], [place], [place] Phone:      Fax:     Reason for request: continuity of care   Information to be released:    [ x ] LAST COLONOSCOPY,  including any PATH REPORT and follow-up  [  ] LAST FIT/COLOGUARD RESULT [  ] LAST DEXA  [  ] LAST MAMMOGRAM  [  ] LAST PAP  [  ] LAST LABS [  ] RETINA EXAM REPORT  [  ] IMMUNIZATION RECORDS  [  ] Release all info      [  ] Check here and initial the line next to each item to release ALL health information INCLUDING  _____ Care and treatment for drug and / or alcohol abuse  _____ HIV testing, infection status, or AIDS  _____ Genetic Testing    DATES OF SERVICE OR TIME PERIOD TO BE DISCLOSED: _____________  I understand and acknowledge that:  * This Authorization may be revoked at any time by you in writing, except if your health information has already been used or disclosed.  * Your health information that will be used or disclosed as a result of you signing this authorization could be re-disclosed by the recipient. If this occurs, your re-disclosed health information may no longer be protected by State or Federal laws.  * You may refuse to sign this Authorization. Your refusal will not affect your ability to obtain treatment.  * This Authorization becomes effective upon signing and will  on (date) __________.      If no date is indicated, this Authorization will  one (1) year from the signature date.    Name: Quinn Ramirez    Signature:   Date:      11/8/2022       PLEASE FAX REQUESTED RECORDS BACK TO: [phone]

## 2022-11-09 LAB
CREAT UR-MCNC: 66.9 MG/DL
MICROALBUMIN UR-MCNC: 1.9 MG/DL
MICROALBUMIN/CREAT UR: 28 MG/G (ref 0–30)

## 2022-11-14 ENCOUNTER — TELEPHONE (OUTPATIENT)
Dept: HEALTH INFORMATION MANAGEMENT | Facility: OTHER | Age: 67
End: 2022-11-14

## 2022-11-28 ENCOUNTER — APPOINTMENT (OUTPATIENT)
Dept: SLEEP MEDICINE | Facility: MEDICAL CENTER | Age: 67
End: 2022-11-28
Payer: MEDICARE

## 2022-11-29 ENCOUNTER — TELEPHONE (OUTPATIENT)
Dept: SLEEP MEDICINE | Facility: MEDICAL CENTER | Age: 67
End: 2022-11-29
Payer: MEDICARE

## 2022-11-29 NOTE — TELEPHONE ENCOUNTER
11-29-22  1st NO SHOW  Date of No Show: 11-28-22  Provider: Omayra Simons  Reason For Visit: 5 WK FV/PFT  Outcome of call:  no answer LVM.  Left call back for scheduling [phone].

## 2024-05-17 ENCOUNTER — DOCUMENTATION (OUTPATIENT)
Dept: HEALTH INFORMATION MANAGEMENT | Facility: OTHER | Age: 69
End: 2024-05-17
Payer: COMMERCIAL